# Patient Record
Sex: MALE | Race: WHITE | NOT HISPANIC OR LATINO | Employment: OTHER | URBAN - METROPOLITAN AREA
[De-identification: names, ages, dates, MRNs, and addresses within clinical notes are randomized per-mention and may not be internally consistent; named-entity substitution may affect disease eponyms.]

---

## 2017-01-16 ENCOUNTER — TRANSFERRED RECORDS (OUTPATIENT)
Dept: HEALTH INFORMATION MANAGEMENT | Facility: CLINIC | Age: 52
End: 2017-01-16

## 2017-01-18 ENCOUNTER — TRANSFERRED RECORDS (OUTPATIENT)
Dept: HEALTH INFORMATION MANAGEMENT | Facility: CLINIC | Age: 52
End: 2017-01-18

## 2017-02-15 ENCOUNTER — OFFICE VISIT (OUTPATIENT)
Dept: FAMILY MEDICINE | Facility: CLINIC | Age: 52
End: 2017-02-15
Payer: COMMERCIAL

## 2017-02-15 VITALS
DIASTOLIC BLOOD PRESSURE: 90 MMHG | WEIGHT: 205 LBS | BODY MASS INDEX: 32.18 KG/M2 | HEIGHT: 67 IN | SYSTOLIC BLOOD PRESSURE: 148 MMHG | HEART RATE: 88 BPM | TEMPERATURE: 99 F

## 2017-02-15 DIAGNOSIS — R42 DIZZINESS: Primary | ICD-10-CM

## 2017-02-15 DIAGNOSIS — R68.89 TEMPERATURE INTOLERANCE: ICD-10-CM

## 2017-02-15 LAB
ANION GAP SERPL CALCULATED.3IONS-SCNC: 9 MMOL/L (ref 3–14)
BUN SERPL-MCNC: 10 MG/DL (ref 7–30)
CALCIUM SERPL-MCNC: 9 MG/DL (ref 8.5–10.1)
CHLORIDE SERPL-SCNC: 107 MMOL/L (ref 94–109)
CO2 SERPL-SCNC: 26 MMOL/L (ref 20–32)
CREAT SERPL-MCNC: 1.04 MG/DL (ref 0.66–1.25)
ERYTHROCYTE [DISTWIDTH] IN BLOOD BY AUTOMATED COUNT: 12.6 % (ref 10–15)
GFR SERPL CREATININE-BSD FRML MDRD: 75 ML/MIN/1.7M2
GLUCOSE SERPL-MCNC: 62 MG/DL (ref 70–99)
HCT VFR BLD AUTO: 46.2 % (ref 40–53)
HGB BLD-MCNC: 16.3 G/DL (ref 13.3–17.7)
MCH RBC QN AUTO: 29.6 PG (ref 26.5–33)
MCHC RBC AUTO-ENTMCNC: 35.3 G/DL (ref 31.5–36.5)
MCV RBC AUTO: 84 FL (ref 78–100)
PLATELET # BLD AUTO: 279 10E9/L (ref 150–450)
POTASSIUM SERPL-SCNC: 3.8 MMOL/L (ref 3.4–5.3)
RBC # BLD AUTO: 5.51 10E12/L (ref 4.4–5.9)
SODIUM SERPL-SCNC: 142 MMOL/L (ref 133–144)
TSH SERPL DL<=0.005 MIU/L-ACNC: 1.22 MU/L (ref 0.4–4)
WBC # BLD AUTO: 8.1 10E9/L (ref 4–11)

## 2017-02-15 PROCEDURE — 80048 BASIC METABOLIC PNL TOTAL CA: CPT | Performed by: FAMILY MEDICINE

## 2017-02-15 PROCEDURE — 85027 COMPLETE CBC AUTOMATED: CPT | Performed by: FAMILY MEDICINE

## 2017-02-15 PROCEDURE — 99214 OFFICE O/P EST MOD 30 MIN: CPT | Performed by: FAMILY MEDICINE

## 2017-02-15 PROCEDURE — 36415 COLL VENOUS BLD VENIPUNCTURE: CPT | Performed by: FAMILY MEDICINE

## 2017-02-15 PROCEDURE — 93000 ELECTROCARDIOGRAM COMPLETE: CPT | Performed by: FAMILY MEDICINE

## 2017-02-15 PROCEDURE — 84443 ASSAY THYROID STIM HORMONE: CPT | Performed by: FAMILY MEDICINE

## 2017-02-15 ASSESSMENT — ANXIETY QUESTIONNAIRES
1. FEELING NERVOUS, ANXIOUS, OR ON EDGE: MORE THAN HALF THE DAYS
5. BEING SO RESTLESS THAT IT IS HARD TO SIT STILL: MORE THAN HALF THE DAYS
2. NOT BEING ABLE TO STOP OR CONTROL WORRYING: NEARLY EVERY DAY
7. FEELING AFRAID AS IF SOMETHING AWFUL MIGHT HAPPEN: NOT AT ALL
6. BECOMING EASILY ANNOYED OR IRRITABLE: SEVERAL DAYS
GAD7 TOTAL SCORE: 12
3. WORRYING TOO MUCH ABOUT DIFFERENT THINGS: MORE THAN HALF THE DAYS

## 2017-02-15 ASSESSMENT — PATIENT HEALTH QUESTIONNAIRE - PHQ9: 5. POOR APPETITE OR OVEREATING: MORE THAN HALF THE DAYS

## 2017-02-15 NOTE — PATIENT INSTRUCTIONS
*   Not sure what's going. Will start the process.     *   Do routine blood tests, EKG.     *   Also, obtain a MRI of the brain. Call [phone] to set this up.     *   In the meantime, don't fall and hurt yourself.     *   Next step: see cardiology. Call [phone].

## 2017-02-15 NOTE — MR AVS SNAPSHOT
After Visit Summary   2/15/2017    Edis Burroughs    MRN: [id number]           Patient Information     Date Of Birth          1965        Visit Information        Provider Department      2/15/2017 3:00 PM Ashwini Jessica MD [facility]        Today's Diagnoses     Dizziness    -  1    Temperature intolerance          Care Instructions    *   Not sure what's going. Will start the process.     *   Do routine blood tests, EKG.     *   Also, obtain a MRI of the brain. Call [phone] to set this up.     *   In the meantime, don't fall and hurt yourself.     *   Next step: see cardiology. Call [phone].         Follow-ups after your visit        Additional Services     CARDIOLOGY EVAL ADULT REFERRAL       Your provider has referred you to:  [facility]: [facility] [phone]   [email or web address]    Please be aware that coverage of these services is subject to the terms and limitations of your health insurance plan.  Call member services at your health plan with any benefit or coverage questions.      Type of Referral:  New Cardiology Consult    Timeframe requested:  Within 1 month    Please bring the following to your appointment:  >>   Any x-rays, CTs or MRIs which have been performed.  Contact the facility where they were done to arrange for  prior to your scheduled appointment.    >>   List of current medications  >>   This referral request   >>   Any documents/labs given to you for this referral                  Future tests that were ordered for you today     Open Future Orders        Priority Expected Expires Ordered    MR Brain w/o & w Contrast Routine  2/15/2018 2/15/2017            Who to contact     Normal or non-critical lab and imaging results will be communicated to you by MyChart, letter or phone within 4 business days after the clinic has received the results. If you do  "not hear from us within 7 days, please contact the clinic through [employer] or phone. If you have a critical or abnormal lab result, we will notify you by phone as soon as possible.  Submit refill requests through [employer] or call your pharmacy and they will forward the refill request to us. Please allow 3 business days for your refill to be completed.          If you need to speak with a  for additional information , please call: [phone]           Additional Information About Your Visit        [employer] Information     [employer] lets you send messages to your doctor, view your test results, renew your prescriptions, schedule appointments and more. To sign up, go to [email or web address] . Click on \"Log in\" on the left side of the screen, which will take you to the Welcome page. Then click on \"Sign up Now\" on the right side of the page.     You will be asked to enter the access code listed below, as well as some personal information. Please follow the directions to create your username and password.     Your access code is: [email or web address]  Expires: 2017 11:20 AM     Your access code will  in 90 days. If you need help or a new code, please call your [facility] clinic or [phone].        Care EveryWhere ID     This is your Care EveryWhere ID. This could be used by other organizations to access your [facility] medical records  [id number]        Your Vitals Were     Pulse Temperature Height BMI (Body Mass Index)          88 99  F (37.2  C) (Tympanic) 5' 7\" (1.702 m) 32.11 kg/m2         Blood Pressure from Last 3 Encounters:   02/15/17 148/90   16 138/80   16 130/88    Weight from Last 3 Encounters:   02/15/17 205 lb (93 kg)   16 210 lb (95.3 kg)   16 206 lb 3.2 oz (93.5 kg)              We Performed the Following     Basic metabolic panel     CARDIOLOGY EVAL ADULT REFERRAL     CBC with platelets     EKG 12-lead complete w/read - Clinics     TSH with free T4 reflex     "      Today's Medication Changes          These changes are accurate as of: 2/15/17  3:40 PM.  If you have any questions, ask your nurse or doctor.               Stop taking these medicines if you haven't already. Please contact your care team if you have questions.     ciprofloxacin 500 MG tablet   Commonly known as:  CIPRO   Stopped by:  Ashwini Jessica MD                    Primary Care Provider Office Phone # Fax #    Ashwini Jessica [phone]       [facility] [address]   [place] [address]        Thank you!     Thank you for choosing [facility]  for your care. Our goal is always to provide you with excellent care. Hearing back from our patients is one way we can continue to improve our services. Please take a few minutes to complete the written survey that you may receive in the mail after your visit with us. Thank you!             Your Updated Medication List - Protect others around you: Learn how to safely use, store and throw away your medicines at www.disposemymeds.org.          This list is accurate as of: 2/15/17  3:40 PM.  Always use your most recent med list.                   Brand Name Dispense Instructions for use    divalproex 500 MG 24 hr tablet    DEPAKOTE ER     Take 500 mg by mouth daily.       LamoTRIgine 300 MG Tb24    LaMICtal     Take  by mouth. One daily       OLANZapine 20 MG tablet    zyPREXA         SEROQUEL 300 MG tablet   Generic drug:  QUEtiapine      Take 300 mg by mouth. TID qhs       valACYclovir 1000 mg tablet    VALTREX    21 tablet    Take 1 tablet (1,000 mg) by mouth 3 times daily       ZOLOFT 100 MG tablet   Generic drug:  sertraline     60 Tab    2 TABLET DAILY

## 2017-02-15 NOTE — NURSING NOTE
"Chief Complaint   Patient presents with     Dizziness       Initial /90 (BP Location: Left arm, Patient Position: Chair, Cuff Size: Adult Large)  Pulse 88  Temp 99  F (37.2  C) (Tympanic)  Ht 5' 7\" (1.702 m)  Wt 205 lb (93 kg)  BMI 32.11 kg/m2 Estimated body mass index is 32.11 kg/(m^2) as calculated from the following:    Height as of this encounter: 5' 7\" (1.702 m).    Weight as of this encounter: 205 lb (93 kg).  Medication Reconciliation: complete    "

## 2017-02-15 NOTE — PROGRESS NOTES
"  SUBJECTIVE:                                                    Edis Burroughs is a 51 year old male who presents to clinic today for the following health issues:    - Dizziness with tunnel vision x2 months. He has thrown up in the mornings and he has had diarrhea. When this happens he feels warm and body shakes. He has fallen when at home 2 times. No injuries He has been having ECT treatment recently and is wondering if this is related, he has not stopped this 2 weeks ago until dizziness can be resolved. He seems to have these episodes daily, average happening 1-4 episodes per day, episodes usually last 30 seconds - 2 minutes.He usually has to sit down or hold onto fixed item to wit for episode to pass. He describes his dizziness as the room gets dark and narrows. He feels like he is going to faint but does not pass out. He says he just shuts down, its not like he loses his balance. He has not noticed fast or irregular heartbeats. He has had headaches and ringing in the ear at times. He has had 2 episodes today and has had up to 4 in one day. He has stopped the ECT treatment until he figures out what is going on with his dizziness. He has not had an MRI for his brain. He has not noticed any changes in his hearing. He has no metal in his body. He also states he gets real flushed, warm, and shaky when the dizziness hits.     Dizziness     Onset: 2 months    Description:   Do you feel faint:  YES  Does it feel like the surroundings (bed, room) are moving: no   Unsteady/off balance: YES  Have you passed out or fallen: YES    Intensity: severe    Progression of Symptoms:  worsening and waxing and waning    Accompanying Signs & Symptoms:  Heart palpitations: no   Nausea, vomiting: no   Weakness in arms or legs: YES  Fatigue: YES- but states that for years he only averages 3-4 hours of sleep  Vision or speech changes: YES- Blurry, \"cloudy\" vision  Ringing in ears (Tinnitus): YES  Hearing Loss: no    History:   Head " "trauma/concussion hx: no   Previous similar symptoms: no   Recent bleeding history: no     Precipitating factors:   Worse with activity or head movement: no   Any new medications (BP?): no   Alcohol/drug abuse/withdrawal: no     Alleviating factors:   Does staying in a fixed position give relief:  YES       Therapies Tried and outcome: None          ROS:  Constitutional, HEENT, cardiovascular, pulmonary, gi and gu systems are negative, except as otherwise noted.    This document serves as a record of the services and decisions personally performed and made by Ashwini Jessica MD. It was created on his behalf by Sonu Garcia, a trained medical scribe. The creation of this document is based the provider's statements to the medical scribe.  Sonu Garcia 2:54 PM February 15, 2017      OBJECTIVE:                                                    /90 (BP Location: Left arm, Patient Position: Chair, Cuff Size: Adult Large)  Pulse 88  Temp 99  F (37.2  C) (Tympanic)  Ht 5' 7\" (1.702 m)  Wt 205 lb (93 kg)  BMI 32.11 kg/m2  Body mass index is 32.11 kg/(m^2).     GENERAL: healthy, alert and no distress  EYES: Eyes grossly normal to inspection, conjunctivae and sclerae normal  HENT: ear canals and TM's normal, nose and mouth without ulcers or lesions  RESP: lungs clear to auscultation - no rales, rhonchi or wheezes  CV: regular rate and rhythm, normal S1 S2, no murmur  MS: no gross musculoskeletal defects noted, no edema. Reflexes intact. Balance appears normal.   NEURO: Normal strength and tone, mentation intact and speech normal  PSYCH: mentation appears normal, affect normal/bright         ASSESSMENT/PLAN:                                                      (R42) Dizziness  (primary encounter diagnosis)  Comment: Etiology unknown. I ordered EKG and MRI for further evaluation. Will also do blood tests for further evaluation. I advised him to see a cardiologist, seems like a symptom of low blood pressure.    Plan: " EKG 12-lead complete w/read - Clinics, MR Brain        w/o & w Contrast, CBC with platelets, Basic         metabolic panel         (R68.89) Temperature intolerance  Comment: Etiology unknown. Will check thyroid function.   Plan: TSH with free T4 reflex              Patient Instructions   *   Not sure what's going. Will start the process.     *   Do routine blood tests, EKG.     *   Also, obtain a MRI of the brain. Call [phone] to set this up.     *   In the meantime, don't fall and hurt yourself.     *   Next step: see cardiology. Call [phone].       Patient will follow up if symptoms worsen or do not improve. Patient instructed to call with any questions or concerns.    The information in this document, created by a scribe for me, accurately reflects the services I personally performed and the decisions made by me. I have reviewed and approved this document for accuracy. 3:09 PM2/15/2017      Ashwini Jessica MD  [facility]

## 2017-02-16 ASSESSMENT — PATIENT HEALTH QUESTIONNAIRE - PHQ9: SUM OF ALL RESPONSES TO PHQ QUESTIONS 1-9: 18

## 2017-02-16 ASSESSMENT — ANXIETY QUESTIONNAIRES: GAD7 TOTAL SCORE: 12

## 2017-02-20 ENCOUNTER — HOSPITAL ENCOUNTER (OUTPATIENT)
Dept: MRI IMAGING | Facility: CLINIC | Age: 52
Discharge: HOME | End: 2017-02-20
Attending: FAMILY MEDICINE | Admitting: FAMILY MEDICINE
Payer: MEDICARE

## 2017-02-20 DIAGNOSIS — R42 DIZZINESS: ICD-10-CM

## 2017-02-20 PROCEDURE — 70553 MRI BRAIN STEM W/O & W/DYE: CPT | Mod: 25

## 2017-02-20 PROCEDURE — 25500064 ZZH RX 255 OP 636: Performed by: FAMILY MEDICINE

## 2017-02-20 PROCEDURE — A9585 GADOBUTROL INJECTION: HCPCS | Performed by: FAMILY MEDICINE

## 2017-02-20 RX ORDER — GADOBUTROL 604.72 MG/ML
9 INJECTION INTRAVENOUS ONCE
Status: COMPLETED | OUTPATIENT
Start: 2017-02-20 | End: 2017-02-20

## 2017-02-20 RX ADMIN — GADOBUTROL 9 ML: 604.72 INJECTION INTRAVENOUS at 08:38

## 2017-03-20 ENCOUNTER — OFFICE VISIT (OUTPATIENT)
Dept: CARDIOLOGY | Facility: CLINIC | Age: 52
End: 2017-03-20
Attending: FAMILY MEDICINE
Payer: COMMERCIAL

## 2017-03-20 VITALS
BODY MASS INDEX: 32.11 KG/M2 | SYSTOLIC BLOOD PRESSURE: 169 MMHG | WEIGHT: 205 LBS | HEART RATE: 92 BPM | DIASTOLIC BLOOD PRESSURE: 98 MMHG | OXYGEN SATURATION: 97 %

## 2017-03-20 DIAGNOSIS — I10 BENIGN ESSENTIAL HYPERTENSION: Primary | ICD-10-CM

## 2017-03-20 DIAGNOSIS — R55 SYNCOPE, UNSPECIFIED SYNCOPE TYPE: ICD-10-CM

## 2017-03-20 DIAGNOSIS — G47.00 INSOMNIA, UNSPECIFIED TYPE: ICD-10-CM

## 2017-03-20 PROCEDURE — 99205 OFFICE O/P NEW HI 60 MIN: CPT | Performed by: INTERNAL MEDICINE

## 2017-03-20 RX ORDER — LOSARTAN POTASSIUM 50 MG/1
50 TABLET ORAL DAILY
Qty: 30 TABLET | Refills: 11 | Status: SHIPPED
Start: 2017-03-20 | End: 2017-03-24

## 2017-03-20 NOTE — PROGRESS NOTES
HPI and Plan:   See dictation    Orders Placed This Encounter   Procedures     Basic metabolic panel     SLEEP EVALUATION & MANAGEMENT REFERRAL - ADULT     Follow-Up with Cardiologist     Follow-Up with Cardiac Advanced Practice Provider     Holter Monitor 24 hour - Adult     Exercise Stress Echocardiogram       Orders Placed This Encounter   Medications     losartan (COZAAR) 50 MG tablet     Sig: Take 1 tablet (50 mg) by mouth daily     Dispense:  30 tablet     Refill:  11       There are no discontinued medications.      Encounter Diagnoses   Name Primary?     Benign essential hypertension Yes     Syncope, unspecified syncope type      Insomnia, unspecified type        CURRENT MEDICATIONS:  Current Outpatient Prescriptions   Medication Sig Dispense Refill     losartan (COZAAR) 50 MG tablet Take 1 tablet (50 mg) by mouth daily 30 tablet 11     OLANZapine (ZYPREXA) 20 MG tablet        valACYclovir (VALTREX) 1000 mg tablet Take 1 tablet (1,000 mg) by mouth 3 times daily 21 tablet 0     QUEtiapine (SEROQUEL) 300 MG tablet Take 300 mg by mouth. TID qhs       divalproex (DEPAKOTE) 500 MG 24 hr tablet Take 500 mg by mouth daily.       LamoTRIgine 300 MG TB24 Take  by mouth. One daily       ZOLOFT 100 MG OR TABS 2 TABLET DAILY 60 Tab 1       ALLERGIES     Allergies   Allergen Reactions     Vicodin [Acetaminophen] GI Disturbance     Stomach discomfort       PAST MEDICAL HISTORY:  No past medical history on file.    PAST SURGICAL HISTORY:  Past Surgical History   Procedure Laterality Date     C appendectomy  1997     C laminectomy,facetectomy,lumbar  2008       FAMILY HISTORY:  Family History   Problem Relation Age of Onset     Prostate Cancer Father      HEART DISEASE Father      open heart surgery     CANCER Father      lung     BARTOLO.AESCOBAR Father      triple bypass, first MI age 59     DIABETES Maternal Grandmother      GIOVANNY Maternal Grandmother      DIABETES Maternal Grandfather      GIOVANNY Maternal Grandfather       CEREBROVASCULAR DISEASE Paternal Grandfather      C.A.D. Paternal Grandfather      Hypertension No family hx of      Breast Cancer No family hx of      Cancer - colorectal No family hx of      C.A.D. No family hx of      C.A.D. Paternal Grandmother      CANCER Sister      leukemia       SOCIAL HISTORY:  Social History     Social History     Marital status:      Spouse name: N/A     Number of children: N/A     Years of education: N/A     Social History Main Topics     Smoking status: Never Smoker     Smokeless tobacco: Never Used     Alcohol use 0.0 oz/week     0 Standard drinks or equivalent per week      Comment: rarely     Drug use: No     Sexual activity: Yes     Partners: Female     Other Topics Concern     Parent/Sibling W/ Cabg, Mi Or Angioplasty Before 65f 55m? No     Social History Narrative       Review of Systems:  Skin:  Negative       Eyes:  Positive for visual blurring    ENT:  Positive for tinnitus;nasal congestion    Respiratory:  Positive for dyspnea on exertion     Cardiovascular:    Positive for;chest pain;lightheadedness;syncope or near-syncope    Gastroenterology: Negative      Genitourinary:  Positive for urinary frequency    Musculoskeletal:  Negative      Neurologic:  Negative      Psychiatric:  Positive for anxiety;depression    Heme/Lymph/Imm:  Negative      Endocrine:  Negative        Physical Exam:  Vitals: BP (!) 169/98 (BP Location: Right arm, Patient Position: Chair, Cuff Size: Adult Regular)  Pulse 92  Wt 93 kg (205 lb)  SpO2 97%  BMI 32.11 kg/m2    Constitutional:  cooperative, alert and oriented, well developed, well nourished, in no acute distress        Skin:  warm and dry to the touch        Head:  normocephalic        Eyes:  sclera white        ENT:           Neck:  carotid pulses are full and equal bilaterally, JVP normal, no carotid bruit, no thyromegaly        Chest:  normal breath sounds, clear to auscultation, normal A-P diameter, normal symmetry, normal respiratory  excursion, no use of accessory muscles          Cardiac: regular rhythm, normal S1/S2, no S3 or S4, apical impulse not displaced, no murmurs, gallops or rubs                  Abdomen:  abdomen soft, non-tender, BS normoactive, no mass, no HSM, no bruits        Vascular:                                          Extremities and Back:  no deformities, clubbing, cyanosis, erythema observed;no edema              Neurological:  affect appropriate, oriented to time, person and place;no gross motor deficits              CC  Ashwini Jessica MD  [facility]  [address] [place] [address]

## 2017-03-20 NOTE — PATIENT INSTRUCTIONS
"Thank you for your [facility] visit today. Your provider has recommended the following:  Medication Changes:  Start Losartan 50 mg 1 tablet a day  Recommendations:  Non fasting labs BMP in 1-2 weeks  Stress echocardiogram next available  24 hour Holter next available  Sleep study next available  Your provider has recommended you have a sleep consult referral & sleep study done. To schedule this appointment, please call the number below where you would like your referral and study completed.  [facility]: [phone]; [place]: [phone]; [facility]: [phone]; [place]: [phone]. (If you have an existing C-PAP machine and are looking to talk with someone regarding re-adjustments or any problems with your machine you can contact \"[facility]\" in [place], [place] at [phone])    Follow-up:  See STEVE for cardiology follow up in 1 month.  See Dr. Machado in 6-9 months.  We kindly ask that you call cardiology scheduling at [phone] three months prior to requested revisit date to schedule future cardiology appointments.  Reminder:  1. Please bring in your current medication list or your medication, over the counter supplements and vitamin bottles as we will review these at each office visit.               [facility]  [facility]~[address]~[place], [place]~[address]  Questions about your visit today?  Call your Cardiology Clinic RN's-Elaine Santana and/or Shelby Serna at [phone].        "

## 2017-03-20 NOTE — LETTER
"3/20/2017    Ashwini Jessica MD  [facility]  [address] [place] [address]    RE: Edis Burroughs       Dear Colleague,    I had the pleasure of seeing Edis Burroughs in the [facility].    Mr. Edis Burroughs is 51 years old.  He has been referred for cardiology consultation for near-syncope/syncope by Dr. sAhwini Jessica.      Mr. Burroughs denies any prior history of heart disease.  He has previously undergone echocardiography and that was a number of years ago.  His left ventricular systolic performance was normal and he had no evidence of structural heart disease.      In the last 6 months, he has experienced episodic near-syncope/syncope.  He has typically experienced indices the episodes when he is standing.  He will feel a warm or flushed feeling come over him.  He then develops \"tunnel vision\" and feels light-headed.  He usually has some morning.  He was in a [employer] within the last several weeks with his son.  He felt the symptoms and told his son he needed to sit down.  His son was trying to help him to a chair, but they did not reach a chair and he was syncopal.  He awakened relatively quickly and he did not have any injury.  He notes that the employees wanted to call an ambulance, but he told them that this had happened before and he would be fine.      He is uncertain that he experiences nausea, shortness of breath or chest discomfort with the episodes.  Those were symptoms which he had never noticed.  He does not believe that these episodes occur at any particular time of day or with any particular activity.  He has had 1 episode while driving.  He was not syncopal.  He notes that he felt warm and had time to pull off the road and into a parking lot associated with a fast food [employer].  He sat in his car for about 10 minutes until the symptom passed.  The symptoms are not associated with palpitations such as tachycardia or more forceful contractions.  " They do not occur right after meals.      Mr. Burroughs does not typically develop chest discomfort.  He does have a family history of coronary artery disease.  He does not currently smoke and does not have diabetes mellitus.  He has been hypertensive and he notes that his blood pressures have been increasing.      He noted the episodes beginning about 6 months ago.  He has been treated for depression and the medications utilized have not changed recently.  He began ECT about 2 months ago.  He believes the episodes became more frequent with ECT and may have occurred within a day or possibly 2 days of ECT treatments.  He has not completed the initial set of prescribed treatments which does not allow him to assess the efficacy of ECT, but he is very hopeful that he can resume ECT which has been temporarily suspended.      He has not been using alcohol in association with the episodes and does not typically drink alcohol now.  He is not using any over-the-counter supplements.  He has not recently changed his activity.  He is retired.  He has been able to go out for walks without difficulty or any change in exertional tolerance or dyspnea.      PHYSICAL EXAMINATION:   GENERAL:  This is a man in no apparent distress.  The first blood pressure was 169/98 mmHg, the second was taken by myself and was 160/85 mmHg in the right arm, sitting, with a large cuff.  The heart rate was 92 beats per minute and the respiratory rate was 14-18 per minute.   CHEST:  Clear to auscultation.   CARDIAC:  On cardiac auscultation, there was an S1 and an S2 without extra sounds other than an S4.  The rhythm was regular.  There was no murmur.     Outpatient Encounter Prescriptions as of 3/20/2017   Medication Sig Dispense Refill     [DISCONTINUED] losartan (COZAAR) 50 MG tablet Take 1 tablet (50 mg) by mouth daily 30 tablet 11     OLANZapine (ZYPREXA) 20 MG tablet        valACYclovir (VALTREX) 1000 mg tablet Take 1 tablet (1,000 mg) by mouth 3  times daily 21 tablet 0     QUEtiapine (SEROQUEL) 300 MG tablet Take 300 mg by mouth. TID qhs       divalproex (DEPAKOTE) 500 MG 24 hr tablet Take 500 mg by mouth daily.       LamoTRIgine 300 MG TB24 Take  by mouth. One daily       ZOLOFT 100 MG OR TABS 2 TABLET DAILY 60 Tab 1     No facility-administered encounter medications on file as of 3/20/2017.       ASSESSMENT:  Mr. Burroughs has episodes of near-syncope which began before ECT, but which have increased in severity and frequency with ECT, so he believes.  He thinks that over the years he may have had similar but less severe and very infrequent episodes.      Most likely, his symptoms are vagal.  They may be a response to the electroconvulsive therapy which is also associated with anesthesia and being n.p.o. in preparation for the procedure.  That combination of circumstances could precipitate more significant vagal events later in the day.  I explained this to Mr. Burroughs.      I am also suspicious that the development of hypertension has contributed to greater swings in his blood pressure.  I have recommended antihypertensive therapy which I believe will be beneficial and may actually reduce the episodes if significant hypertension is a precipitating factor.  As such, I recommended antihypertensive therapy and I discussed this with Mr. Burroughs.      I have also arranged for stress echocardiography.  I would like to repeat echocardiography to be certain that there is no obvious structural heart disease.  I have recommended the stress portion to rule out coronary artery disease which can be a rare cause of near-syncope or syncope and the blood pressure response as well as the heart rate response to exercise will be beneficial.      I have also recommended a return visit.  I have recommended a 24-hour Holter monitor be performed to evaluate any arrhythmias.      Mr. Burroughs has been experiencing these episodes nearly daily, though fortunately most episodes are very  brief.  The episodes typically last less than 10 minutes and the less severe episodes resolve relatively rapidly.  He does try to sit down and I have recommended that whenever he feels the symptoms coming on he sit down or lie down and immediately tell people that this is a typical event for him and that he will be able to handle the symptoms.  The episodes seem to have decreased with the hold on electroconvulsive therapy, but have continued.      My suspicion is that he will benefit from treatment of his hypertension.  He will benefit from an increase in activity.  I have also recommended a sleep study as I am highly suspicious that he has sleep apnea (he does not sleep well) and that sleep deprivation is contributing to these symptoms.  In addition, sleep apnea can lead to pulmonary hypertension and right ventricular hypertrophy with impaired cardiac output in response to precipitous blood pressure declined.  I discussed this with Mr. Burroughs and have also recommended a sleep study which he is anxious to perform.      Further recommendations will depend upon his response to therapy.     Again, thank you for allowing me to participate in the care of your patient.      Sincerely,    Sudha Machado MD     [facility]

## 2017-03-20 NOTE — MR AVS SNAPSHOT
"              After Visit Summary   3/20/2017    Edis Burroughs    MRN: [id number]           Patient Information     Date Of Birth          1965        Visit Information        Provider Department      3/20/2017 10:30 AM Sudha Machado MD [facility] PHYSICIAN HEART AT [facility]        Today's Diagnoses     Benign essential hypertension    -  1    Syncope, unspecified syncope type        Insomnia, unspecified type          Care Instructions    Thank you for your  Heart Care visit today. Your provider has recommended the following:  Medication Changes:  Start Losartan 50 mg 1 tablet a day  Recommendations:  Non fasting labs BMP in 1-2 weeks  Stress echocardiogram next available  24 hour Holter next available  Sleep study next available  Your provider has recommended you have a sleep consult referral & sleep study done. To schedule this appointment, please call the number below where you would like your referral and study completed.  [facility]: [phone]; [place]: [phone]; [facility]: [phone]; [place] [facility]: [phone]. (If you have an existing C-PAP machine and are looking to talk with someone regarding re-adjustments or any problems with your machine you can contact \"[facility]\" in [place], [place] at [phone])    Follow-up:  See STEVE for cardiology follow up in 1 month.  See Dr. Machado in 6-9 months.  We kindly ask that you call cardiology scheduling at [phone] three months prior to requested revisit date to schedule future cardiology appointments.  Reminder:  1. Please bring in your current medication list or your medication, over the counter supplements and vitamin bottles as we will review these at each office visit.               [facility] HE[facility] CARE  [facility]~[address]~[place], [place]~[address]  Questions about your visit today?  Call your Cardiology Clinic RN's-Elaine Santana and/or Shelby Serna at " [phone].              Follow-ups after your visit        Additional Services     SLEEP EVALUATION & MANAGEMENT REFERRAL - ADULT       Please be aware that coverage of these services is subject to the terms and limitations of your health insurance plan.  Call member services at your health plan with any benefit or coverage questions.      Please bring the following to your appointment:    >>   List of current medications   >>   This referral request   >>   Any documents/labs given to you for this referral    [facility] / Lab  Ph [phone] (Age 2 and up)            Follow-Up with Cardiac Advanced Practice Provider           Follow-Up with Cardiologist                 Future tests that were ordered for you today     Open Future Orders        Priority Expected Expires Ordered    Exercise Stress Echocardiogram Routine 3/20/2017 3/20/2018 3/20/2017    Holter Monitor 24 hour - Adult Routine 3/20/2017 5/4/2017 3/20/2017    SLEEP EVALUATION & MANAGEMENT REFERRAL - ADULT Routine 3/20/2017 3/20/2018 3/20/2017    Follow-Up with Cardiologist Routine 9/20/2017 3/20/2019 3/20/2017    Follow-Up with Cardiac Advanced Practice Provider Routine 4/20/2017 3/20/2019 3/20/2017    Basic metabolic panel Routine 4/7/2017 3/20/2018 3/20/2017            Who to contact     If you have questions or need follow up information about today's clinic visit or your schedule please contact [facility] PHYSICIAN HEART AT [facility] directly at [phone].  Normal or non-critical lab and imaging results will be communicated to you by MyChart, letter or phone within 4 business days after the clinic has received the results. If you do not hear from us within 7 days, please contact the clinic through MyChart or phone. If you have a critical or abnormal lab result, we will notify you by phone as soon as possible.  Submit refill requests through [facility] or call your pharmacy and they will forward the refill  "request to us. Please allow 3 business days for your refill to be completed.          Additional Information About Your Visit        [employer] Information     [employer] lets you send messages to your doctor, view your test results, renew your prescriptions, schedule appointments and more. To sign up, go to [email or web address] . Click on \"Log in\" on the left side of the screen, which will take you to the Welcome page. Then click on \"Sign up Now\" on the right side of the page.     You will be asked to enter the access code listed below, as well as some personal information. Please follow the directions to create your username and password.     Your access code is: [email or web address]  Expires: 2017 11:47 AM     Your access code will  in 90 days. If you need help or a new code, please call your [facility] clinic or [phone].        Care EveryWhere ID     This is your Care EveryWhere ID. This could be used by other organizations to access your [facility] medical records  [id number]        Your Vitals Were     Pulse Pulse Oximetry BMI (Body Mass Index)             92 97% 32.11 kg/m2          Blood Pressure from Last 3 Encounters:   17 (!) 169/98   02/15/17 148/90   16 138/80    Weight from Last 3 Encounters:   17 93 kg (205 lb)   02/15/17 93 kg (205 lb)   16 95.3 kg (210 lb)                 Today's Medication Changes          These changes are accurate as of: 3/20/17 11:47 AM.  If you have any questions, ask your nurse or doctor.               Start taking these medicines.        Dose/Directions    losartan 50 MG tablet   Commonly known as:  COZAAR   Used for:  Benign essential hypertension   Started by:  Sudha Machado MD        Dose:  50 mg   Take 1 tablet (50 mg) by mouth daily   Quantity:  30 tablet   Refills:  11            Where to get your medicines      These medications were sent to [facility]  EMEKA GALLEGOS  [address] [facility] [address]     Phone:  [phone]     " losartan 50 MG tablet                Primary Care Provider Office Phone # Fax #    Ashwini Jessica [phone]       [facility] [address] [place] [address]        Thank you!     Thank you for choosing [facility] PHYSICIAN HEART AT [facility]  for your care. Our goal is always to provide you with excellent care. Hearing back from our patients is one way we can continue to improve our services. Please take a few minutes to complete the written survey that you may receive in the mail after your visit with us. Thank you!             Your Updated Medication List - Protect others around you: Learn how to safely use, store and throw away your medicines at www.disposemymeds.org.          This list is accurate as of: 3/20/17 11:47 AM.  Always use your most recent med list.                   Brand Name Dispense Instructions for use    divalproex 500 MG 24 hr tablet    DEPAKOTE ER     Take 500 mg by mouth daily.       LamoTRIgine 300 MG Tb24    LaMICtal     Take  by mouth. One daily       losartan 50 MG tablet    COZAAR    30 tablet    Take 1 tablet (50 mg) by mouth daily       OLANZapine 20 MG tablet    zyPREXA         SEROQUEL 300 MG tablet   Generic drug:  QUEtiapine      Take 300 mg by mouth. TID qhs       valACYclovir 1000 mg tablet    VALTREX    21 tablet    Take 1 tablet (1,000 mg) by mouth 3 times daily       ZOLOFT 100 MG tablet   Generic drug:  sertraline     60 Tab    2 TABLET DAILY

## 2017-03-22 NOTE — PROGRESS NOTES
"HISTORY OF PRESENT ILLNESS:  Mr. Edis Burroughs is 51 years old.  He has been referred for cardiology consultation for near-syncope/syncope by Dr. Ashwini Jessica.      Mr. Burroughs denies any prior history of heart disease.  He has previously undergone echocardiography and that was a number of years ago.  His left ventricular systolic performance was normal and he had no evidence of structural heart disease.      In the last 6 months, he has experienced episodic near-syncope/syncope.  He has typically experienced indices the episodes when he is standing.  He will feel a warm or flushed feeling come over him.  He then develops \"tunnel vision\" and feels light-headed.  He usually has some morning.  He was in a [employer] within the last several weeks with his son.  He felt the symptoms and told his son he needed to sit down.  His son was trying to help him to a chair, but they did not reach a chair and he was syncopal.  He awakened relatively quickly and he did not have any injury.  He notes that the employees wanted to call an ambulance, but he told them that this had happened before and he would be fine.      He is uncertain that he experiences nausea, shortness of breath or chest discomfort with the episodes.  Those were symptoms which he had never noticed.  He does not believe that these episodes occur at any particular time of day or with any particular activity.  He has had 1 episode while driving.  He was not syncopal.  He notes that he felt warm and had time to pull off the road and into a parking lot associated with a fast food [employer].  He sat in his car for about 10 minutes until the symptom passed.  The symptoms are not associated with palpitations such as tachycardia or more forceful contractions.  They do not occur right after meals.      Mr. Burroughs does not typically develop chest discomfort.  He does have a family history of coronary artery disease.  He does not currently smoke and does not have diabetes " mellitus.  He has been hypertensive and he notes that his blood pressures have been increasing.      He noted the episodes beginning about 6 months ago.  He has been treated for depression and the medications utilized have not changed recently.  He began ECT about 2 months ago.  He believes the episodes became more frequent with ECT and may have occurred within a day or possibly 2 days of ECT treatments.  He has not completed the initial set of prescribed treatments which does not allow him to assess the efficacy of ECT, but he is very hopeful that he can resume ECT which has been temporarily suspended.      He has not been using alcohol in association with the episodes and does not typically drink alcohol now.  He is not using any over-the-counter supplements.  He has not recently changed his activity.  He is retired.  He has been able to go out for walks without difficulty or any change in exertional tolerance or dyspnea.      PHYSICAL EXAMINATION:   GENERAL:  This is a man in no apparent distress.  The first blood pressure was 169/98 mmHg, the second was taken by myself and was 160/85 mmHg in the right arm, sitting, with a large cuff.  The heart rate was 92 beats per minute and the respiratory rate was 14-18 per minute.   CHEST:  Clear to auscultation.   CARDIAC:  On cardiac auscultation, there was an S1 and an S2 without extra sounds other than an S4.  The rhythm was regular.  There was no murmur.      ASSESSMENT:  Mr. Burroughs has episodes of near-syncope which began before ECT, but which have increased in severity and frequency with ECT, so he believes.  He thinks that over the years he may have had similar but less severe and very infrequent episodes.      Most likely, his symptoms are vagal.  They may be a response to the electroconvulsive therapy which is also associated with anesthesia and being n.p.o. in preparation for the procedure.  That combination of circumstances could precipitate more significant  vagal events later in the day.  I explained this to Mr. Burroughs.      I am also suspicious that the development of hypertension has contributed to greater swings in his blood pressure.  I have recommended antihypertensive therapy which I believe will be beneficial and may actually reduce the episodes if significant hypertension is a precipitating factor.  As such, I recommended antihypertensive therapy and I discussed this with Mr. Burroughs.      I have also arranged for stress echocardiography.  I would like to repeat echocardiography to be certain that there is no obvious structural heart disease.  I have recommended the stress portion to rule out coronary artery disease which can be a rare cause of near-syncope or syncope and the blood pressure response as well as the heart rate response to exercise will be beneficial.      I have also recommended a return visit.  I have recommended a 24-hour Holter monitor be performed to evaluate any arrhythmias.      Mr. Burroughs has been experiencing these episodes nearly daily, though fortunately most episodes are very brief.  The episodes typically last less than 10 minutes and the less severe episodes resolve relatively rapidly.  He does try to sit down and I have recommended that whenever he feels the symptoms coming on he sit down or lie down and immediately tell people that this is a typical event for him and that he will be able to handle the symptoms.  The episodes seem to have decreased with the hold on electroconvulsive therapy, but have continued.      My suspicion is that he will benefit from treatment of his hypertension.  He will benefit from an increase in activity.  I have also recommended a sleep study as I am highly suspicious that he has sleep apnea (he does not sleep well) and that sleep deprivation is contributing to these symptoms.  In addition, sleep apnea can lead to pulmonary hypertension and right ventricular hypertrophy with impaired cardiac output in  response to precipitous blood pressure declined.  I discussed this with Mr. Burroughs and have also recommended a sleep study which he is anxious to perform.      Further recommendations will depend upon his response to therapy.         CIRO BENNETT MD, [facility]             D: 2017 21:56   T: 2017 14:36   MT: MELQUIADES      Name:     CAMILLE BURROUGHS   MRN:      [id number]        Account:      [id number]   :      1965           Service Date: 2017      Document: [id number]

## 2017-03-24 DIAGNOSIS — I10 BENIGN ESSENTIAL HYPERTENSION: ICD-10-CM

## 2017-03-24 RX ORDER — LOSARTAN POTASSIUM 50 MG/1
50 TABLET ORAL DAILY
Qty: 30 TABLET | Refills: 11 | Status: SHIPPED | OUTPATIENT
Start: 2017-03-24 | End: 2018-03-05

## 2017-04-20 ENCOUNTER — HOSPITAL ENCOUNTER (OUTPATIENT)
Dept: CARDIOLOGY | Facility: CLINIC | Age: 52
Discharge: HOME | End: 2017-04-20
Attending: INTERNAL MEDICINE | Admitting: INTERNAL MEDICINE
Payer: MEDICARE

## 2017-04-20 DIAGNOSIS — R55 SYNCOPE, UNSPECIFIED SYNCOPE TYPE: ICD-10-CM

## 2017-04-20 PROCEDURE — 93321 DOPPLER ECHO F-UP/LMTD STD: CPT | Mod: 26 | Performed by: INTERNAL MEDICINE

## 2017-04-20 PROCEDURE — 93016 CV STRESS TEST SUPVJ ONLY: CPT | Performed by: FAMILY MEDICINE

## 2017-04-20 PROCEDURE — 25500064 ZZH RX 255 OP 636: Performed by: FAMILY MEDICINE

## 2017-04-20 PROCEDURE — 93018 CV STRESS TEST I&R ONLY: CPT | Performed by: INTERNAL MEDICINE

## 2017-04-20 PROCEDURE — 93325 DOPPLER ECHO COLOR FLOW MAPG: CPT | Mod: 26 | Performed by: INTERNAL MEDICINE

## 2017-04-20 PROCEDURE — 40000264 ECHO STRESS WITH OPTISON

## 2017-04-20 PROCEDURE — 93227 XTRNL ECG REC<48 HR R&I: CPT | Performed by: INTERNAL MEDICINE

## 2017-04-20 PROCEDURE — 93350 STRESS TTE ONLY: CPT | Mod: 26 | Performed by: INTERNAL MEDICINE

## 2017-04-20 RX ADMIN — HUMAN ALBUMIN MICROSPHERES AND PERFLUTREN 2 ML: 10; .22 INJECTION, SOLUTION INTRAVENOUS at 09:19

## 2017-04-29 DIAGNOSIS — I10 BENIGN ESSENTIAL HYPERTENSION: ICD-10-CM

## 2017-04-29 LAB
ANION GAP SERPL CALCULATED.3IONS-SCNC: 10 MMOL/L (ref 3–14)
BUN SERPL-MCNC: 14 MG/DL (ref 7–30)
CALCIUM SERPL-MCNC: 8.7 MG/DL (ref 8.5–10.1)
CHLORIDE SERPL-SCNC: 105 MMOL/L (ref 94–109)
CO2 SERPL-SCNC: 23 MMOL/L (ref 20–32)
CREAT SERPL-MCNC: 1.02 MG/DL (ref 0.66–1.25)
GFR SERPL CREATININE-BSD FRML MDRD: 77 ML/MIN/1.7M2
GLUCOSE SERPL-MCNC: 146 MG/DL (ref 70–99)
POTASSIUM SERPL-SCNC: 3.9 MMOL/L (ref 3.4–5.3)
SODIUM SERPL-SCNC: 138 MMOL/L (ref 133–144)

## 2017-04-29 PROCEDURE — 80048 BASIC METABOLIC PNL TOTAL CA: CPT | Performed by: FAMILY MEDICINE

## 2017-04-29 PROCEDURE — 36415 COLL VENOUS BLD VENIPUNCTURE: CPT | Performed by: FAMILY MEDICINE

## 2017-11-28 ENCOUNTER — TELEPHONE (OUTPATIENT)
Dept: LAB | Facility: CLINIC | Age: 52
End: 2017-11-28

## 2017-11-28 DIAGNOSIS — Z13.6 CARDIOVASCULAR SCREENING; LDL GOAL LESS THAN 160: Primary | ICD-10-CM

## 2017-11-28 DIAGNOSIS — R73.09 ABNORMAL GLUCOSE: ICD-10-CM

## 2017-11-28 DIAGNOSIS — Z79.899 MEDICATION MANAGEMENT: ICD-10-CM

## 2017-11-29 DIAGNOSIS — Z13.6 CARDIOVASCULAR SCREENING; LDL GOAL LESS THAN 160: ICD-10-CM

## 2017-11-29 DIAGNOSIS — Z79.899 MEDICATION MANAGEMENT: ICD-10-CM

## 2017-11-29 DIAGNOSIS — Z79.899 ENCOUNTER FOR LONG-TERM CURRENT USE OF MEDICATION: Primary | ICD-10-CM

## 2017-11-29 DIAGNOSIS — G47.9 SLEEP DISORDER: ICD-10-CM

## 2017-11-29 DIAGNOSIS — F31.0 BIPOLAR I DISORDER, MOST RECENT EPISODE HYPOMANIC (H): ICD-10-CM

## 2017-11-29 LAB
ALT SERPL W P-5'-P-CCNC: 26 U/L (ref 0–70)
AST SERPL W P-5'-P-CCNC: 13 U/L (ref 0–45)
CHOLEST SERPL-MCNC: 227 MG/DL
GLUCOSE SERPL-MCNC: 75 MG/DL (ref 70–99)
HBA1C MFR BLD: 5.4 % (ref 4.3–6)
HDLC SERPL-MCNC: 45 MG/DL
LDLC SERPL CALC-MCNC: 138 MG/DL
NONHDLC SERPL-MCNC: 182 MG/DL
TRIGL SERPL-MCNC: 222 MG/DL
VALPROATE SERPL-MCNC: 44 MG/L (ref 50–100)

## 2017-11-29 PROCEDURE — 80164 ASSAY DIPROPYLACETIC ACD TOT: CPT | Performed by: PSYCHIATRY & NEUROLOGY

## 2017-11-29 PROCEDURE — 83036 HEMOGLOBIN GLYCOSYLATED A1C: CPT | Performed by: FAMILY MEDICINE

## 2017-11-29 PROCEDURE — 84450 TRANSFERASE (AST) (SGOT): CPT | Performed by: PSYCHIATRY & NEUROLOGY

## 2017-11-29 PROCEDURE — 36415 COLL VENOUS BLD VENIPUNCTURE: CPT | Performed by: FAMILY MEDICINE

## 2017-11-29 PROCEDURE — 84460 ALANINE AMINO (ALT) (SGPT): CPT | Performed by: PSYCHIATRY & NEUROLOGY

## 2017-11-29 PROCEDURE — 80061 LIPID PANEL: CPT | Performed by: FAMILY MEDICINE

## 2017-11-29 PROCEDURE — 82947 ASSAY GLUCOSE BLOOD QUANT: CPT | Performed by: PSYCHIATRY & NEUROLOGY

## 2018-02-12 ENCOUNTER — OFFICE VISIT (OUTPATIENT)
Dept: FAMILY MEDICINE | Facility: CLINIC | Age: 53
End: 2018-02-12
Payer: COMMERCIAL

## 2018-02-12 VITALS
SYSTOLIC BLOOD PRESSURE: 148 MMHG | HEART RATE: 80 BPM | BODY MASS INDEX: 33.19 KG/M2 | HEIGHT: 67 IN | DIASTOLIC BLOOD PRESSURE: 100 MMHG | WEIGHT: 211.5 LBS

## 2018-02-12 DIAGNOSIS — L98.9 SKIN LESION: ICD-10-CM

## 2018-02-12 DIAGNOSIS — I10 HYPERTENSION, GOAL BELOW 140/90: Primary | ICD-10-CM

## 2018-02-12 DIAGNOSIS — F31.9 BIPOLAR AFFECTIVE DISORDER, REMISSION STATUS UNSPECIFIED (H): ICD-10-CM

## 2018-02-12 LAB
ANION GAP SERPL CALCULATED.3IONS-SCNC: 2 MMOL/L (ref 3–14)
BUN SERPL-MCNC: 11 MG/DL (ref 7–30)
CALCIUM SERPL-MCNC: 9.2 MG/DL (ref 8.5–10.1)
CHLORIDE SERPL-SCNC: 105 MMOL/L (ref 94–109)
CO2 SERPL-SCNC: 31 MMOL/L (ref 20–32)
CREAT SERPL-MCNC: 0.94 MG/DL (ref 0.66–1.25)
CREAT UR-MCNC: 61 MG/DL
GFR SERPL CREATININE-BSD FRML MDRD: 84 ML/MIN/1.7M2
GLUCOSE SERPL-MCNC: 90 MG/DL (ref 70–99)
MICROALBUMIN UR-MCNC: <5 MG/L
MICROALBUMIN/CREAT UR: NORMAL MG/G CR (ref 0–17)
POTASSIUM SERPL-SCNC: 4 MMOL/L (ref 3.4–5.3)
SODIUM SERPL-SCNC: 138 MMOL/L (ref 133–144)

## 2018-02-12 PROCEDURE — 99214 OFFICE O/P EST MOD 30 MIN: CPT | Mod: 25 | Performed by: FAMILY MEDICINE

## 2018-02-12 PROCEDURE — 11200 RMVL SKIN TAGS UP TO&INC 15: CPT | Performed by: FAMILY MEDICINE

## 2018-02-12 PROCEDURE — 80048 BASIC METABOLIC PNL TOTAL CA: CPT | Performed by: FAMILY MEDICINE

## 2018-02-12 PROCEDURE — 36415 COLL VENOUS BLD VENIPUNCTURE: CPT | Performed by: FAMILY MEDICINE

## 2018-02-12 PROCEDURE — 82043 UR ALBUMIN QUANTITATIVE: CPT | Performed by: FAMILY MEDICINE

## 2018-02-12 RX ORDER — LOSARTAN POTASSIUM AND HYDROCHLOROTHIAZIDE 12.5; 1 MG/1; MG/1
1 TABLET ORAL DAILY
Qty: 90 TABLET | Refills: 1 | Status: SHIPPED | OUTPATIENT
Start: 2018-02-12 | End: 2018-10-05

## 2018-02-12 ASSESSMENT — ANXIETY QUESTIONNAIRES
7. FEELING AFRAID AS IF SOMETHING AWFUL MIGHT HAPPEN: NOT AT ALL
1. FEELING NERVOUS, ANXIOUS, OR ON EDGE: SEVERAL DAYS
2. NOT BEING ABLE TO STOP OR CONTROL WORRYING: SEVERAL DAYS
GAD7 TOTAL SCORE: 6
5. BEING SO RESTLESS THAT IT IS HARD TO SIT STILL: SEVERAL DAYS
6. BECOMING EASILY ANNOYED OR IRRITABLE: SEVERAL DAYS
3. WORRYING TOO MUCH ABOUT DIFFERENT THINGS: SEVERAL DAYS

## 2018-02-12 ASSESSMENT — PATIENT HEALTH QUESTIONNAIRE - PHQ9: 5. POOR APPETITE OR OVEREATING: SEVERAL DAYS

## 2018-02-12 NOTE — LETTER
My Depression Action Plan  Name: Edis Burroughs   Date of Birth 1965  Date: 2/12/2018    My doctor: Ashwini Jessica   My clinic: [address] [address] [phone]          GREEN    ZONE   Good Control    What it looks like:     Things are going generally well. You have normal up s and down s. You may even feel depressed from time to time, but bad moods usually last less than a day.   What you need to do:  1. Continue to care for yourself (see self care plan)  2. Check your depression survival kit and update it as needed  3. Follow your physician s recommendations including any medication.  4. Do not stop taking medication unless you consult with your physician first.           YELLOW         ZONE Getting Worse    What it looks like:     Depression is starting to interfere with your life.     It may be hard to get out of bed; you may be starting to isolate yourself from others.    Symptoms of depression are starting to last most all day and this has happened for several days.     You may have suicidal thoughts but they are not constant.   What you need to do:     1. Call your care team, your response to treatment will improve if you keep your care team informed of your progress. Yellow periods are signs an adjustment may need to be made.     2. Continue your self-care, even if you have to fake it!    3. Talk to someone in your support network    4. Open up your depression survival kit           RED    ZONE Medical Alert - Get Help    What it looks like:     Depression is seriously interfering with your life.     You may experience these or other symptoms: You can t get out of bed most days, can t work or engage in other necessary activities, you have trouble taking care of basic hygiene, or basic responsibilities, thoughts of suicide or death that will not go away, self-injurious behavior.     What you need to do:  1. Call your care team and  request a same-day appointment. If they are not available (weekends or after hours) call your local crisis line, emergency room or 911.      Electronically signed by: Altagracia Acuna, February 12, 2018    Depression Self Care Plan / Survival Kit    Self-Care for Depression  Here s the deal. Your body and mind are really not as separate as most people think.  What you do and think affects how you feel and how you feel influences what you do and think. This means if you do things that people who feel good do, it will help you feel better.  Sometimes this is all it takes.  There is also a place for medication and therapy depending on how severe your depression is, so be sure to consult with your medical provider and/ or Behavioral Health Consultant if your symptoms are worsening or not improving.     In order to better manage my stress, I will:    Exercise  Get some form of exercise, every day. This will help reduce pain and release endorphins, the  feel good  chemicals in your brain. This is almost as good as taking antidepressants!  This is not the same as joining a gym and then never going! (they count on that by the way ) It can be as simple as just going for a walk or doing some gardening, anything that will get you moving.      Hygiene   Maintain good hygiene (Get out of bed in the morning, Make your bed, Brush your teeth, Take a shower, and Get dressed like you were going to work, even if you are unemployed).  If your clothes don't fit try to get ones that do.    Diet  I will strive to eat foods that are good for me, drink plenty of water, and avoid excessive sugar, caffeine, alcohol, and other mood-altering substances.  Some foods that are helpful in depression are: complex carbohydrates, B vitamins, flaxseed, fish or fish oil, fresh fruits and vegetables.    Psychotherapy  I agree to participate in Individual Therapy (if recommended).    Medication  If prescribed medications, I agree to take them.  Missing  doses can result in serious side effects.  I understand that drinking alcohol, or other illicit drug use, may cause potential side effects.  I will not stop my medication abruptly without first discussing it with my provider.    Staying Connected With Others  I will stay in touch with my friends, family members, and my primary care provider/team.    Use your imagination  Be creative.  We all have a creative side; it doesn t matter if it s oil painting, sand castles, or mud pies! This will also kick up the endorphins.    Witness Beauty  (AKA stop and smell the roses) Take a look outside, even in mid-winter. Notice colors, textures. Watch the squirrels and birds.     Service to others  Be of service to others.  There is always someone else in need.  By helping others we can  get out of ourselves  and remember the really important things.  This also provides opportunities for practicing all the other parts of the program.    Humor  Laugh and be silly!  Adjust your TV habits for less news and crime-drama and more comedy.    Control your stress  Try breathing deep, massage therapy, biofeedback, and meditation. Find time to relax each day.     My support system    Clinic Contact:  Phone number:    Contact 1:  Phone number:    Contact 2:  Phone number:    Anabaptism/:  Phone number:    Therapist:  Phone number:    Local crisis center:    Phone number:    Other community support:  Phone number:

## 2018-02-12 NOTE — LETTER
February 15, 2018      Edis Giangner  [address] [place] [address]        Dear ,    Your blood tests show that you have normal kidney function, and there is no signs of diabetes.     Your urine test for protein was normal, no signs of excessive protein in your urine. If elevated, this can be a sign of kidney damage. This test is recommended yearly for people with high blood pressure.     Hopefully, the new blood pressure medication will help lower your blood pressure readings.  As we talked about, I would recommend a follow-up appointment in 1 month.     Resulted Orders   Basic metabolic panel  (Ca, Cl, CO2, Creat, Gluc, K, Na, BUN)   Result Value Ref Range    Sodium 138 133 - 144 mmol/L    Potassium 4.0 3.4 - 5.3 mmol/L    Chloride 105 94 - 109 mmol/L    Carbon Dioxide 31 20 - 32 mmol/L    Anion Gap 2 (L) 3 - 14 mmol/L    Glucose 90 70 - 99 mg/dL      Comment:      Non Fasting    Urea Nitrogen 11 7 - 30 mg/dL    Creatinine 0.94 0.66 - 1.25 mg/dL    GFR Estimate 84 >60 mL/min/1.7m2      Comment:      Non  GFR Calc    GFR Estimate If Black >90 >60 mL/min/1.7m2      Comment:       GFR Calc    Calcium 9.2 8.5 - 10.1 mg/dL   Albumin Random Urine Quantitative with Creat Ratio   Result Value Ref Range    Creatinine Urine 61 mg/dL    Albumin Urine mg/L <5 mg/L    Albumin Urine mg/g Cr Unable to calculate due to low value 0 - 17 mg/g Cr       If you have any questions or concerns, please call the clinic at the number listed above.       Sincerely,        Ashwini Jessica MD / azael

## 2018-02-12 NOTE — NURSING NOTE
"Chief Complaint   Patient presents with     Hypertension       Initial BP (!) 148/100  Pulse 80  Ht 5' 7\" (1.702 m)  Wt 211 lb 8 oz (95.9 kg)  BMI 33.13 kg/m2 Estimated body mass index is 33.13 kg/(m^2) as calculated from the following:    Height as of this encounter: 5' 7\" (1.702 m).    Weight as of this encounter: 211 lb 8 oz (95.9 kg).  Medication Reconciliation: complete  "

## 2018-02-12 NOTE — PATIENT INSTRUCTIONS
*  Continue seeing the psychiatry and your counselor. Takes time.     *   The blood pressure is up, will another medication: HCTZ.     *   This would be a combination pill.     *    Follow up in one month.

## 2018-02-12 NOTE — MR AVS SNAPSHOT
"              After Visit Summary   2/12/2018    Edis Burroughs    MRN: [id number]           Patient Information     Date Of Birth          1965        Visit Information        Provider Department      2/12/2018 10:40 AM Ashwini Jessica MD [facility]        Today's Diagnoses     Hypertension, goal below 140/90    -  1    Bipolar affective disorder, remission status unspecified (H)          Care Instructions    *  Continue seeing the psychiatry and your counselor. Takes time.     *   The blood pressure is up, will another medication: HCTZ.     *   This would be a combination pill.     *    Follow up in one month.               Follow-ups after your visit        Follow-up notes from your care team     Return in about 1 month (around 3/12/2018).      Who to contact     Normal or non-critical lab and imaging results will be communicated to you by [employer], letter or phone within 4 business days after the clinic has received the results. If you do not hear from us within 7 days, please contact the clinic through [employer] or phone. If you have a critical or abnormal lab result, we will notify you by phone as soon as possible.  Submit refill requests through [employer] or call your pharmacy and they will forward the refill request to us. Please allow 3 business days for your refill to be completed.          If you need to speak with a  for additional information , please call: [phone]           Additional Information About Your Visit        [employer] Information     [employer] lets you send messages to your doctor, view your test results, renew your prescriptions, schedule appointments and more. To sign up, go to [email or web address] . Click on \"Log in\" on the left side of the screen, which will take you to the Welcome page. Then click on \"Sign up Now\" on the right side of the page.     You will be asked to enter the access code listed below, as well as some personal information. " "Please follow the directions to create your username and password.     Your access code is: [email or web address]  Expires: 2018 11:15 AM     Your access code will  in 90 days. If you need help or a new code, please call your [facility] clinic or [phone].        Care EveryWhere ID     This is your Care EveryWhere ID. This could be used by other organizations to access your [facility] medical records  [id number]        Your Vitals Were     Pulse Height BMI (Body Mass Index)             80 5' 7\" (1.702 m) 33.13 kg/m2          Blood Pressure from Last 3 Encounters:   18 (!) 148/100   17 (!) 169/98   02/15/17 148/90    Weight from Last 3 Encounters:   18 211 lb 8 oz (95.9 kg)   17 205 lb (93 kg)   02/15/17 205 lb (93 kg)              We Performed the Following     Albumin Random Urine Quantitative with Creat Ratio     Basic metabolic panel  (Ca, Cl, CO2, Creat, Gluc, K, Na, BUN)     DEPRESSION ACTION PLAN (DAP)          Today's Medication Changes          These changes are accurate as of 18 11:15 AM.  If you have any questions, ask your nurse or doctor.               Start taking these medicines.        Dose/Directions    losartan-hydrochlorothiazide 100-12.5 MG per tablet   Commonly known as:  HYZAAR   Used for:  Hypertension, goal below 140/90   Started by:  Ashwini Jessica MD        Dose:  1 tablet   Take 1 tablet by mouth daily   Quantity:  90 tablet   Refills:  1            Where to get your medicines      These medications were sent to [facility] [place]  [place], [place] - [address]  [address] [facility] [address] [facility] [address]     Phone:  [phone]     losartan-hydrochlorothiazide 100-12.5 MG per tablet                Primary Care Provider Office Phone # Fax #    Ashwini Jessica [phone] [address]  [place] [facility] [address]        Equal Access to Services     TORY KAN AH: Hadii edil rossi Sopayal, waaxda luqadaha, qaybta " chuckie dubosegabe Paredes [facility] [phone].    ATENCIÓN: Si cameron mccormick, tiene a giordano disposición servicios gratuitos de asistencia lingüística. Prakash al [phone].    We comply with applicable federal civil rights laws and [place] laws. We do not discriminate on the basis of race, color, national origin, age, disability, sex, sexual orientation, or gender identity.            Thank you!     Thank you for choosing [facility]  for your care. Our goal is always to provide you with excellent care. Hearing back from our patients is one way we can continue to improve our services. Please take a few minutes to complete the written survey that you may receive in the mail after your visit with us. Thank you!             Your Updated Medication List - Protect others around you: Learn how to safely use, store and throw away your medicines at www.disposemymeds.org.          This list is accurate as of 2/12/18 11:15 AM.  Always use your most recent med list.                   Brand Name Dispense Instructions for use Diagnosis    divalproex sodium extended-release 500 MG 24 hr tablet    DEPAKOTE ER     Take 500 mg by mouth daily.        LamoTRIgine 300 MG Tb24    LaMICtal     Take  by mouth. One daily        losartan 50 MG tablet    COZAAR    30 tablet    Take 1 tablet (50 mg) by mouth daily    Benign essential hypertension       losartan-hydrochlorothiazide 100-12.5 MG per tablet    HYZAAR    90 tablet    Take 1 tablet by mouth daily    Hypertension, goal below 140/90       OLANZapine 20 MG tablet    zyPREXA          SEROQUEL 300 MG tablet   Generic drug:  QUEtiapine      Take 300 mg by mouth. TID qhs        valACYclovir 1000 mg tablet    VALTREX    21 tablet    Take 1 tablet (1,000 mg) by mouth 3 times daily    Herpes zoster without complication       ZOLOFT 100 MG tablet   Generic drug:  sertraline     60 Tab    2 TABLET DAILY    Depressive disorder, not elsewhere  classified

## 2018-02-12 NOTE — PROGRESS NOTES
"  SUBJECTIVE:   Edis Burroughs is a 52 year old male who presents to clinic today for the following health issues:      Hypertension Follow-up  Losartan 50mg qd    Outpatient blood pressures are being checked at home.  Results are 150/100s.    Low Salt Diet: low salt    - BP readings at home are consistent with today's reading.     - Remove skin tags from under right eye.    - Patient has not completed colonoscopy in the past, he did send FIT testing card in through [employer] last week, he has not received results back yet. He denies any family history of colon cancer in first degree relative.       Amount of exercise or physical activity: walking daily with dog    Problems taking medications regularly: No    Medication side effects: none    Diet: low fat/cholesterol        ROS:  Constitutional, HEENT, cardiovascular, pulmonary, gi and gu systems are negative, except as otherwise noted.    This document serves as a record of the services and decisions personally performed and made by Ashwini Jessica MD. It was created on his behalf by Sonu Garcia, a trained medical scribe. The creation of this document is based the provider's statements to the medical scribe.  Sonu Garcia 10:54 AM February 12, 2018  OBJECTIVE:   BP (!) 148/100  Pulse 80  Ht 5' 7\" (1.702 m)  Wt 211 lb 8 oz (95.9 kg)  BMI 33.13 kg/m2  Body mass index is 33.13 kg/(m^2).       GENERAL: healthy, alert and no distress  EYES: Eyes grossly normal to inspection, conjunctivae and sclerae normal  MS: no gross musculoskeletal defects noted, no edema  SKIN: 2 mm pedunculated, skin tag noted on the mid lower right eyelid  NEURO: Normal strength and tone, mentation intact and speech normal  PSYCH: mentation appears normal, affect normal/bright          ASSESSMENT/PLAN:     (I10) Hypertension, goal below 140/90  (primary encounter diagnosis)  Comment: BP elevated with ARB. Will add diuretic to manage BP. Recheck in 1 month. Will check renal function, " results pending. Primary prevention.   Plan: Basic metabolic panel  (Ca, Cl, CO2, Creat,         Gluc, K, Na, BUN), Albumin Random Urine         Quantitative with Creat Ratio,  losartan-hydrochlorothiazide (HYZAAR) 100-12.5         MG per tablet            (F31.9) Bipolar affective disorder, remission status unspecified (H)  Comment: Stable. Patient follows up with counseling and psychiatry about every 6 months.  Patient is on 5 drug therapy including mood stabilizing Depakote, SSRI,  and 3 atypical antipsychotics.  Denies significant side effects.  Plan: DEPRESSION ACTION PLAN (DAP). Continue current medications.       (L98.9) Skin lesion  Comment: probably tag, will treat and see if the lesion resolves.   Plan: DESTRUCT PREMALIGNANT LESION, FIRST        The skin lesion was grasped with forceps and then treated with liquid nitrogen, duration: 3-5 seconds, x one. Care was taken to avoid the cornea.   If the lesion resolves, this would be consistent with a skin tag, if persists, advised follow up for biopsy.               Patient Instructions   *  Continue seeing the psychiatry and your counselor. Takes time.     *   The blood pressure is up, will another medication: HCTZ.     *   This would be a combination pill.     *    Follow up in one month.         Patient will follow up in 1 month or sooner, PRN. Patient instructed to call with any questions or concerns.      The information in this document, created by a scribe for me, accurately reflects the services I personally performed and the decisions made by me. I have reviewed and approved this document for accuracy. 10:55 AM 2/12/2018      Ashwini Jessica MD  [facility]

## 2018-02-13 ASSESSMENT — PATIENT HEALTH QUESTIONNAIRE - PHQ9: SUM OF ALL RESPONSES TO PHQ QUESTIONS 1-9: 15

## 2018-02-13 ASSESSMENT — ANXIETY QUESTIONNAIRES: GAD7 TOTAL SCORE: 6

## 2018-03-12 ENCOUNTER — OFFICE VISIT (OUTPATIENT)
Dept: FAMILY MEDICINE | Facility: CLINIC | Age: 53
End: 2018-03-12
Payer: COMMERCIAL

## 2018-03-12 VITALS
HEART RATE: 76 BPM | WEIGHT: 214 LBS | BODY MASS INDEX: 33.59 KG/M2 | DIASTOLIC BLOOD PRESSURE: 88 MMHG | HEIGHT: 67 IN | SYSTOLIC BLOOD PRESSURE: 126 MMHG

## 2018-03-12 DIAGNOSIS — F31.9 BIPOLAR AFFECTIVE DISORDER, REMISSION STATUS UNSPECIFIED (H): ICD-10-CM

## 2018-03-12 DIAGNOSIS — I10 HYPERTENSION, GOAL BELOW 140/90: Primary | ICD-10-CM

## 2018-03-12 DIAGNOSIS — Z13.6 CARDIOVASCULAR SCREENING; LDL GOAL LESS THAN 160: ICD-10-CM

## 2018-03-12 PROCEDURE — 80061 LIPID PANEL: CPT | Performed by: FAMILY MEDICINE

## 2018-03-12 PROCEDURE — 99213 OFFICE O/P EST LOW 20 MIN: CPT | Performed by: FAMILY MEDICINE

## 2018-03-12 PROCEDURE — 80048 BASIC METABOLIC PNL TOTAL CA: CPT | Performed by: FAMILY MEDICINE

## 2018-03-12 PROCEDURE — 36415 COLL VENOUS BLD VENIPUNCTURE: CPT | Performed by: FAMILY MEDICINE

## 2018-03-12 NOTE — PATIENT INSTRUCTIONS
*  Your blood pressure is better.     *  Stay on these medications.     *   Will check cholesterol and kidney function today.     *   Follow up in 6 months.

## 2018-03-12 NOTE — PROGRESS NOTES
"  SUBJECTIVE:   Edis Burroughs is a 52 year old male who presents to clinic today for the following health issues:      Hypertension Follow-up  Losartan-hydrochlorothiazide 100-12.5mg qd    Outpatient blood pressures are not being checked.    Low Salt Diet: no added salt    BP Readings from Last 6 Encounters:   03/12/18 128/90   02/12/18 (!) 148/100   03/20/17 (!) 169/98   02/15/17 148/90   12/14/16 138/80   11/22/16 130/88       Depression and Anxiety Follow-Up  Seroquel 600mg qhs, depakote 500mg qd, lamotrigine 300mg qd, zoloft 200mg qd    Status since last visit: No change    Other associated symptoms:None    Complicating factors:     Significant life event: No     Current substance abuse: None    PHQ-9 12/14/2016 2/15/2017 2/12/2018   Total Score 19 18 15   Q9: Suicide Ideation Not at all Not at all Not at all     RACHELL-7 SCORE 12/14/2016 2/15/2017 2/12/2018   Total Score 11 12 6         PHQ-9  English  PHQ-9   Any Language  RACHELL-7  Suicide Assessment Five-step Evaluation and Treatment (SAFE-T)    - Patient states that he has completed FIT testing in the past about a month ago, he is unsure of results. He completed this through [facility] He denies any family history of colon cancer in first degree relative.       Amount of exercise or physical activity: Walking with dog when weather is nice    Problems taking medications regularly: No    Medication side effects: none    Diet: watching what he eats      Problem list and histories reviewed & adjusted, as indicated.  Additional history: as documented      Reviewed and updated as needed this visit by clinical staff       Reviewed and updated as needed this visit by Provider         ROS:  Constitutional, HEENT, cardiovascular, pulmonary, gi and gu systems are negative, except as otherwise noted.    OBJECTIVE:     /88  Pulse 76  Ht 5' 7\" (1.702 m)  Wt 214 lb (97.1 kg)  BMI 33.52 kg/m2  Body mass index is 33.52 kg/(m^2).  GENERAL: Healthy, alert " and no distress  EYES: Eyes grossly normal to inspection, conjunctivae and sclerae normal.  Entire skin check resolved.  RESP: Lungs clear to auscultation - no rales, rhonchi or wheezes  CV: Regular rate and rhythm, normal S1 S2, no murmur  MS: No gross musculoskeletal defects noted, no edema  NEURO: Normal strength and tone, mentation intact and speech normal  PSYCH: Mentation appears normal, affect normal/bright     Results for orders placed or performed in visit on 03/12/18   Lipid panel reflex to direct LDL Non-fasting   Result Value Ref Range    Cholesterol 201 (H) <200 mg/dL    Triglycerides 201 (H) <150 mg/dL    HDL Cholesterol 37 (L) >39 mg/dL    LDL Cholesterol Calculated 124 (H) <100 mg/dL    Non HDL Cholesterol 164 (H) <130 mg/dL   Basic metabolic panel   Result Value Ref Range    Sodium 139 133 - 144 mmol/L    Potassium 4.0 3.4 - 5.3 mmol/L    Chloride 106 94 - 109 mmol/L    Carbon Dioxide 27 20 - 32 mmol/L    Anion Gap 6 3 - 14 mmol/L    Glucose 81 70 - 99 mg/dL    Urea Nitrogen 11 7 - 30 mg/dL    Creatinine 0.94 0.66 - 1.25 mg/dL    GFR Estimate 84 >60 mL/min/1.7m2    GFR Estimate If Black >90 >60 mL/min/1.7m2    Calcium 8.8 8.5 - 10.1 mg/dL      ASSESSMENT/PLAN:     (I10) Hypertension, goal below 140/90  (primary encounter diagnosis)  Comment: Within guidelines from 2 drug therapy, primary prevention, renal function and potassium normal.  Plan: Basic metabolic panel        Continue, 1 year refill.    (Z13.6) CARDIOVASCULAR SCREENING; LDL GOAL LESS THAN 160  Comment: Lipid panel acceptable, elevated LDL and total.  At this point, no clear benefit from statin therapy, will monitor.  Plan: Lipid panel reflex to direct LDL Non-fasting        Recheck lipid panel in 1-2 years.    (F31.9) Bipolar affective disorder, remission status unspecified (H)  Comment: Appears to be doing well on current medications  Plan: Continue with current medications, follow-up per psychiatry          *  Your blood pressure is  better.     *  Stay on these medications.     *   Will check cholesterol and kidney function today.     *   Follow up in 6 months.           Ashwini Jessica MD  [facility]

## 2018-03-12 NOTE — MR AVS SNAPSHOT
"              After Visit Summary   3/12/2018    Edis Burroughs    MRN: [id number]           Patient Information     Date Of Birth          1965        Visit Information        Provider Department      3/12/2018 2:40 PM Ashwini Jessica MD [facility]        Today's Diagnoses     CARDIOVASCULAR SCREENING; LDL GOAL LESS THAN 160    -  1    Hypertension, goal below 140/90          Care Instructions    *  Your blood pressure is better.     *  Stay on these medications.     *   Will check cholesterol and kidney function today.     *   Follow up in 6 months.          Follow-ups after your visit        Follow-up notes from your care team     Return in about 6 months (around 9/12/2018).      Who to contact     Normal or non-critical lab and imaging results will be communicated to you by SEE Forgehart, letter or phone within 4 business days after the clinic has received the results. If you do not hear from us within 7 days, please contact the clinic through SEE Forgehart or phone. If you have a critical or abnormal lab result, we will notify you by phone as soon as possible.  Submit refill requests through SkillsTrak or call your pharmacy and they will forward the refill request to us. Please allow 3 business days for your refill to be completed.          If you need to speak with a  for additional information , please call: [phone]           Additional Information About Your Visit        SkillsTrak Information     SkillsTrak lets you send messages to your doctor, view your test results, renew your prescriptions, schedule appointments and more. To sign up, go to [email or web address] . Click on \"Log in\" on the left side of the screen, which will take you to the Welcome page. Then click on \"Sign up Now\" on the right side of the page.     You will be asked to enter the access code listed below, as well as some personal information. Please follow the directions to create your username and " "password.     Your access code is: [email or web address]  Expires: 2018 12:15 PM     Your access code will  in 90 days. If you need help or a new code, please call your [place] clinic or [phone].        Care EveryWhere ID     This is your Care EveryWhere ID. This could be used by other organizations to access your [place] medical records  [id number]        Your Vitals Were     Pulse Height BMI (Body Mass Index)             76 5' 7\" (1.702 m) 33.52 kg/m2          Blood Pressure from Last 3 Encounters:   18 126/88   18 (!) 148/100   17 (!) 169/98    Weight from Last 3 Encounters:   18 214 lb (97.1 kg)   18 211 lb 8 oz (95.9 kg)   17 205 lb (93 kg)              We Performed the Following     Basic metabolic panel     Lipid panel reflex to direct LDL Non-fasting        Primary Care Provider Office Phone # Fax #    Ashwini Jessica [phone] [address] [place] [address]        Equal Access to Services     [facility]: Hadii aad ku hadasho Soomaali, waaxda luqadaha, qaybta kaalmada adeegyada, waxay lauren haysaudn alexandra chapman ah. So [facility] [phone].    ATENCIÓN: Si habla español, tiene a giordano disposición servicios gratuitos de asistencia lingüística. Llame al [phone].    We comply with applicable federal civil rights laws and [place] laws. We do not discriminate on the basis of race, color, national origin, age, disability, sex, sexual orientation, or gender identity.            Thank you!     Thank you for choosing [facility] [place]  for your care. Our goal is always to provide you with excellent care. Hearing back from our patients is one way we can continue to improve our services. Please take a few minutes to complete the written survey that you may receive in the mail after your visit with us. Thank you!             Your Updated Medication List - Protect others around you: Learn how to safely use, store and throw " away your medicines at www.disposemymeds.org.          This list is accurate as of 3/12/18  2:56 PM.  Always use your most recent med list.                   Brand Name Dispense Instructions for use Diagnosis    divalproex sodium extended-release 500 MG 24 hr tablet    DEPAKOTE ER     Take 500 mg by mouth daily.        LamoTRIgine 300 MG Tb24    LaMICtal     Take  by mouth. One daily        losartan-hydrochlorothiazide 100-12.5 MG per tablet    HYZAAR    90 tablet    Take 1 tablet by mouth daily    Hypertension, goal below 140/90       OLANZapine 20 MG tablet    zyPREXA          SEROQUEL 300 MG tablet   Generic drug:  QUEtiapine      Take 600 mg by mouth At Bedtime TID qhs        valACYclovir 1000 mg tablet    VALTREX    21 tablet    Take 1 tablet (1,000 mg) by mouth 3 times daily    Herpes zoster without complication       ZOLOFT 100 MG tablet   Generic drug:  sertraline     60 Tab    2 TABLET DAILY    Depressive disorder, not elsewhere classified

## 2018-03-12 NOTE — LETTER
March 19, 2018      Edis Burroughs  [address] [place] [address]        Dear ,    Your blood test show that you have normal kidney function asof diabetes. Please see enclosed copies.     Your cholesterol is acceptable. At this time, there is no strong reason for cholesterol lowering medication. I would recommend that you focus on lifestyle, and had a cholesterol rechecked in 1-2 years.    Resulted Orders   Lipid panel reflex to direct LDL Non-fasting   Result Value Ref Range    Cholesterol 201 (H) <200 mg/dL      Comment:      Desirable:       <200 mg/dl    Triglycerides 201 (H) <150 mg/dL      Comment:      Borderline high:  150-199 mg/dl  High:             200-499 mg/dl  Very high:       >499 mg/dl  Non Fasting      HDL Cholesterol 37 (L) >39 mg/dL    LDL Cholesterol Calculated 124 (H) <100 mg/dL      Comment:      Above desirable:  100-129 mg/dl  Borderline High:  130-159 mg/dL  High:             160-189 mg/dL  Very high:       >189 mg/dl      Non HDL Cholesterol 164 (H) <130 mg/dL      Comment:      Above Desirable:  130-159 mg/dl  Borderline high:  160-189 mg/dl  High:             190-219 mg/dl  Very high:       >219 mg/dl     Basic metabolic panel   Result Value Ref Range    Sodium 139 133 - 144 mmol/L    Potassium 4.0 3.4 - 5.3 mmol/L    Chloride 106 94 - 109 mmol/L    Carbon Dioxide 27 20 - 32 mmol/L    Anion Gap 6 3 - 14 mmol/L    Glucose 81 70 - 99 mg/dL      Comment:      Non Fasting    Urea Nitrogen 11 7 - 30 mg/dL    Creatinine 0.94 0.66 - 1.25 mg/dL    GFR Estimate 84 >60 mL/min/1.7m2      Comment:      Non  GFR Calc    GFR Estimate If Black >90 >60 mL/min/1.7m2      Comment:       GFR Calc    Calcium 8.8 8.5 - 10.1 mg/dL       If you have any questions or concerns, please call the clinic at the number listed above.       Sincerely,        Ashwini Jessica MD / azael

## 2018-03-13 LAB
ANION GAP SERPL CALCULATED.3IONS-SCNC: 6 MMOL/L (ref 3–14)
BUN SERPL-MCNC: 11 MG/DL (ref 7–30)
CALCIUM SERPL-MCNC: 8.8 MG/DL (ref 8.5–10.1)
CHLORIDE SERPL-SCNC: 106 MMOL/L (ref 94–109)
CHOLEST SERPL-MCNC: 201 MG/DL
CO2 SERPL-SCNC: 27 MMOL/L (ref 20–32)
CREAT SERPL-MCNC: 0.94 MG/DL (ref 0.66–1.25)
GFR SERPL CREATININE-BSD FRML MDRD: 84 ML/MIN/1.7M2
GLUCOSE SERPL-MCNC: 81 MG/DL (ref 70–99)
HDLC SERPL-MCNC: 37 MG/DL
LDLC SERPL CALC-MCNC: 124 MG/DL
NONHDLC SERPL-MCNC: 164 MG/DL
POTASSIUM SERPL-SCNC: 4 MMOL/L (ref 3.4–5.3)
SODIUM SERPL-SCNC: 139 MMOL/L (ref 133–144)
TRIGL SERPL-MCNC: 201 MG/DL

## 2018-06-22 ENCOUNTER — OFFICE VISIT (OUTPATIENT)
Dept: FAMILY MEDICINE | Facility: CLINIC | Age: 53
End: 2018-06-22
Payer: COMMERCIAL

## 2018-06-22 VITALS
RESPIRATION RATE: 14 BRPM | TEMPERATURE: 97.8 F | DIASTOLIC BLOOD PRESSURE: 80 MMHG | HEART RATE: 72 BPM | BODY MASS INDEX: 31.39 KG/M2 | SYSTOLIC BLOOD PRESSURE: 128 MMHG | WEIGHT: 200.4 LBS

## 2018-06-22 DIAGNOSIS — S05.01XD INJ CONJUNCTIVA AND CORNEAL ABRASION W/O FB, RIGHT EYE, SUBS: Primary | ICD-10-CM

## 2018-06-22 PROCEDURE — 99213 OFFICE O/P EST LOW 20 MIN: CPT | Performed by: NURSE PRACTITIONER

## 2018-06-22 ASSESSMENT — PAIN SCALES - GENERAL: PAINLEVEL: MILD PAIN (3)

## 2018-06-22 NOTE — MR AVS SNAPSHOT
After Visit Summary   6/22/2018    Edis Burroughs    MRN: [id number]           Patient Information     Date Of Birth          1965        Visit Information        Provider Department      6/22/2018 10:20 AM Irina Shipman APRN CNP [facility]        Today's Diagnoses     Inj conjunctiva and corneal abrasion w/o fb, right eye, subs    -  1      Care Instructions    No rubbing the eye.  There is a speck of a foreign body in the right eye a the  3  O'clock area  On the eye     See opthalmology to have it removed     They will probably patch the eye ,  This will change your depth perception     Keep the patch on for  24-36 hours.                  Follow-ups after your visit        Additional Services     OPHTHALMOLOGY ADULT REFERRAL       Your provider has referred you to: [facility] [phone]   [email or web address]  [facility]: [facility] [phone]   [email or web address]  [place] [phone]   [email or web address]    Please be aware that coverage of these services is subject to the terms and limitations of your health insurance plan.  Call member services at your health plan with any benefit or coverage questions.      Please bring the following with you to your appointment:    (1) Any X-Rays, CTs or MRIs which have been performed.  Contact the facility where they were done to arrange for  prior to your scheduled appointment.    (2) List of current medications  (3) This referral request   (4) Any documents/labs given to you for this referral                  Your next 10 appointments already scheduled     Sep 12, 2018 11:00 AM CDT   SHORT with Ashwini Jessica MD   [facility] ([facility])    [address] [address] [phone]              Who to contact     Normal or non-critical lab and imaging results will be  "communicated to you by AdChoicehart, letter or phone within 4 business days after the clinic has received the results. If you do not hear from us within 7 days, please contact the clinic through FarmaciaClubt or phone. If you have a critical or abnormal lab result, we will notify you by phone as soon as possible.  Submit refill requests through NeighborMD or call your pharmacy and they will forward the refill request to us. Please allow 3 business days for your refill to be completed.          If you need to speak with a  for additional information , please call: [phone]           Additional Information About Your Visit        NeighborMD Information     NeighborMD lets you send messages to your doctor, view your test results, renew your prescriptions, schedule appointments and more. To sign up, go to [email or web address] . Click on \"Log in\" on the left side of the screen, which will take you to the Welcome page. Then click on \"Sign up Now\" on the right side of the page.     You will be asked to enter the access code listed below, as well as some personal information. Please follow the directions to create your username and password.     Your access code is: [email or web address]  Expires: 2018 10:09 AM     Your access code will  in 90 days. If you need help or a new code, please call your [place] clinic or [phone].        Care EveryWhere ID     This is your Care EveryWhere ID. This could be used by other organizations to access your [place] medical records  [id number]        Your Vitals Were     Pulse Temperature Respirations BMI (Body Mass Index)          72 97.8  F (36.6  C) (Tympanic) 14 31.39 kg/m2         Blood Pressure from Last 3 Encounters:   18 128/80   18 126/88   18 (!) 148/100    Weight from Last 3 Encounters:   18 200 lb 6.4 oz (90.9 kg)   18 214 lb (97.1 kg)   18 211 lb 8 oz (95.9 kg)              We Performed the Following     OPHTHALMOLOGY ADULT " REFERRAL        Primary Care Provider Office Phone # Fax #    Ashwini Jessica [phone] [address] [place] [address]        Equal Access to Services     [facility]ET[facility] : Hadjojo edil sauer elijaho Isabella, waaxda luqadaha, qaybta kaalmada adeegyada, chuckie thorpejules kenn. So [facility] [phone].    ATENCIÓN: Si habla español, tiene a giordano disposición servicios gratuitos de asistencia lingüística. Llame al [phone].    We comply with applicable federal civil rights laws and [place] laws. We do not discriminate on the basis of race, color, national origin, age, disability, sex, sexual orientation, or gender identity.            Thank you!     Thank you for choosing [facility]  for your care. Our goal is always to provide you with excellent care. Hearing back from our patients is one way we can continue to improve our services. Please take a few minutes to complete the written survey that you may receive in the mail after your visit with us. Thank you!             Your Updated Medication List - Protect others around you: Learn how to safely use, store and throw away your medicines at www.disposemymeds.org.          This list is accurate as of 6/22/18 10:45 AM.  Always use your most recent med list.                   Brand Name Dispense Instructions for use Diagnosis    divalproex sodium extended-release 500 MG 24 hr tablet    DEPAKOTE ER     Take 500 mg by mouth daily.        LamoTRIgine 300 MG Tb24    LaMICtal     Take  by mouth. One daily        losartan-hydrochlorothiazide 100-12.5 MG per tablet    HYZAAR    90 tablet    Take 1 tablet by mouth daily    Hypertension, goal below 140/90       OLANZapine 20 MG tablet    zyPREXA          SEROQUEL 300 MG tablet   Generic drug:  QUEtiapine      Take 600 mg by mouth At Bedtime TID qhs        valACYclovir 1000 mg tablet    VALTREX    21 tablet    Take 1 tablet (1,000 mg) by mouth 3 times daily    Herpes  zoster without complication       ZOLOFT 100 MG tablet   Generic drug:  sertraline     60 Tab    2 TABLET DAILY    Depressive disorder, not elsewhere classified

## 2018-06-22 NOTE — PROGRESS NOTES
SUBJECTIVE:   Edis Burroughs is a 52 year old male who presents to clinic today for the following health issues:      Eye(s) Problem  Onset: 2 days ago    Description:   Location: right  Pain: YES  Redness: YES    Accompanying Signs & Symptoms:  Discharge/mattering: YES- yesterday morning had quite a bit of mattering, today a little bit  Swelling: YES  Visual changes: YES- blurred vision in right eye  Fever: no  Nasal Congestion: no  Bothered by bright lights: YES    History:   Trauma: no, not that patient is aware of   Foreign body exposure: is not sure, had been outside in the garage and shortly after going inside noticed the discomfort in the eye    Precipitating factors:   Wearing contacts: no     Alleviating factors:  Improved by: Warm compress    Therapies Tried and outcome:   Warm compress - helps with some of the discomfort while being applied  Saline drops - will temporarily alleviate the discomfort          Problem list and histories reviewed & adjusted, as indicated.  Additional history: as documented    Patient Active Problem List   Diagnosis     Bipolar affective disorder, remission status unspecified (H)     CARDIOVASCULAR SCREENING; LDL GOAL LESS THAN 160     Hypertension, goal below 140/90     Past Surgical History:   Procedure Laterality Date     C APPENDECTOMY  1997     C LAMINECTOMY,FACETECTOMY,LUMBAR  2008       Social History   Substance Use Topics     Smoking status: Never Smoker     Smokeless tobacco: Never Used     Alcohol use 0.0 oz/week     0 Standard drinks or equivalent per week      Comment: rarely     Family History   Problem Relation Age of Onset     Prostate Cancer Father      HEART DISEASE Father      open heart surgery     Cancer Father      lung     C.A.D. Father      triple bypass, first MI age 59     Cancer Sister      leukemia     Diabetes Maternal Grandmother      FARRUKHA.AMISH. Maternal Grandmother      Diabetes Maternal Grandfather      FARRUKHAESCOBAR Maternal Grandfather       Cerebrovascular Disease Paternal Grandfather      BARTOLO.A.D. Paternal Grandfather      FARRUKHAWOODROW. Paternal Grandmother      Hypertension No family hx of      Breast Cancer No family hx of      Cancer - colorectal No family hx of          Current Outpatient Prescriptions   Medication Sig Dispense Refill     divalproex (DEPAKOTE) 500 MG 24 hr tablet Take 500 mg by mouth daily.       LamoTRIgine 300 MG TB24 Take  by mouth. One daily       losartan-hydrochlorothiazide (HYZAAR) 100-12.5 MG per tablet Take 1 tablet by mouth daily 90 tablet 1     OLANZapine (ZYPREXA) 20 MG tablet        QUEtiapine (SEROQUEL) 300 MG tablet Take 600 mg by mouth At Bedtime TID qhs       valACYclovir (VALTREX) 1000 mg tablet Take 1 tablet (1,000 mg) by mouth 3 times daily (Patient not taking: Reported on 6/22/2018) 21 tablet 0     ZOLOFT 100 MG OR TABS 2 TABLET DAILY 60 Tab 1     Allergies   Allergen Reactions     Vicodin [Acetaminophen] GI Disturbance     Stomach discomfort     BP Readings from Last 3 Encounters:   06/22/18 128/80   03/12/18 126/88   02/12/18 (!) 148/100    Wt Readings from Last 3 Encounters:   06/22/18 200 lb 6.4 oz (90.9 kg)   03/12/18 214 lb (97.1 kg)   02/12/18 211 lb 8 oz (95.9 kg)                    Reviewed and updated as needed this visit by clinical staff       Reviewed and updated as needed this visit by Provider         ROS:  CONSTITUTIONAL: NEGATIVE for fever, chills, change in weight  EYES: POSITIVE for foreign body sensation right,   See note above.   The pain will feel like a grinding in the eye  And then it will resolve .  Vision is good at times it si blurry and hard to keep the eye open .  Has been going on for 2 days.  Has been flushing the eye out but it is not working     OBJECTIVE:     /80 (BP Location: Left arm, Patient Position: Chair, Cuff Size: Adult Large)  Pulse 72  Temp 97.8  F (36.6  C) (Tympanic)  Resp 14  Wt 200 lb 6.4 oz (90.9 kg)  BMI 31.39 kg/m2  Body mass index is 31.39 kg/(m^2).    GENERAL: healthy, alert and no distress  EYES: EOMI, conjunctiva/corneas- conjunctival injection OD and eye was stained, with minimal uptake and  There is a speck of a foreign body    MS: no gross musculoskeletal defects noted, no edema    Diagnostic Test Results:  none     ASSESSMENT/PLAN:     ASSESSMENT/PLAN:      ICD-10-CM    1. Inj conjunctiva and corneal abrasion w/o fb, right eye, subs S05.01XD OPHTHALMOLOGY ADULT REFERRAL       Patient Instructions   No rubbing the eye.  There is a speck of a foreign body in the right eye a the  3  O'clock area  On the eye     See opthalmology to have it removed     They will probably patch the eye ,  This will change your depth perception     Keep the patch on for  24-36 hours.        Has appointment at the [facility]ine office early afternoon today   Did give him an eye patch to put on once he gets to his father's place   No eye drops as the ophthalmologist will need to examine the eye                 CONSULTATION/REFERRAL to opthalmology   See Patient Instructions    LINDA CAM NP, APRN CNP  [facility]

## 2018-06-22 NOTE — PATIENT INSTRUCTIONS
No rubbing the eye.  There is a speck of a foreign body in the right eye a the  3  O'clock area  On the eye     See opthalmology to have it removed     They will probably patch the eye ,  This will change your depth perception     Keep the patch on for  24-36 hours.

## 2018-08-06 ENCOUNTER — TRANSFERRED RECORDS (OUTPATIENT)
Dept: HEALTH INFORMATION MANAGEMENT | Facility: CLINIC | Age: 53
End: 2018-08-06

## 2018-08-06 LAB
ALT SERPL-CCNC: 15 U/L (ref 9–46)
AST SERPL-CCNC: 13 U/L (ref 10–35)
CHOLEST SERPL-MCNC: 186 MG/DL
HBA1C MFR BLD: 5.2 % (ref 0–5.7)
TRIGL SERPL-MCNC: 117 MG/DL

## 2018-10-05 DIAGNOSIS — I10 HYPERTENSION, GOAL BELOW 140/90: ICD-10-CM

## 2018-10-05 RX ORDER — LOSARTAN POTASSIUM AND HYDROCHLOROTHIAZIDE 12.5; 1 MG/1; MG/1
TABLET ORAL
Qty: 90 TABLET | Refills: 0 | Status: SHIPPED | OUTPATIENT
Start: 2018-10-05 | End: 2019-01-28 | Stop reason: DRUGHIGH

## 2018-10-05 NOTE — TELEPHONE ENCOUNTER
Prescription approved per [facility] Refill Protocol.  Maggi Montiel RN

## 2018-10-05 NOTE — TELEPHONE ENCOUNTER
"Requested Prescriptions   Pending Prescriptions Disp Refills     losartan-hydrochlorothiazide (HYZAAR) 100-12.5 MG per tablet [Pharmacy Med Name: LOSARTAN-HCTZ 100MG/12.5MG] 90 tablet 1    Last Written Prescription Date:  02/12/2018 #90 x 1  Last filled 06/22/2018  Last office visit: 6/22/2018 RADHA Shipman   Future Office Visit: None   Sig: TAKE ONE TABLET BY MOUTH EVERY DAY    Angiotensin-II Receptors Passed    10/5/2018  8:34 AM       Passed - Blood pressure under 140/90 in past 12 months    BP Readings from Last 3 Encounters:   06/22/18 128/80   03/12/18 126/88   02/12/18 (!) 148/100                Passed - Recent (12 mo) or future (30 days) visit within the authorizing provider's specialty    Patient had office visit in the last 12 months or has a visit in the next 30 days with authorizing provider or within the authorizing provider's specialty.  See \"Patient Info\" tab in inbasket, or \"Choose Columns\" in Meds & Orders section of the refill encounter.           Passed - Patient is age 18 or older       Passed - Normal serum creatinine on file in past 12 months    Recent Labs   Lab Test  03/12/18   1458   CR  0.94            Passed - Normal serum potassium on file in past 12 months    Recent Labs   Lab Test  03/12/18   1458   POTASSIUM  4.0                      "

## 2018-12-28 NOTE — PROGRESS NOTES
SUBJECTIVE:   Edis Burroughs is a 53 year old male who presents to clinic today for the following health issues:      *URI    Patient Active Problem List   Diagnosis     Bipolar affective disorder, remission status unspecified (H)     CARDIOVASCULAR SCREENING; LDL GOAL LESS THAN 160     Hypertension, goal below 140/90     Past Surgical History:   Procedure Laterality Date     C APPENDECTOMY  1997     C LAMINECTOMY,FACETECTOMY,LUMBAR  2008       Social History     Tobacco Use     Smoking status: Never Smoker     Smokeless tobacco: Never Used   Substance Use Topics     Alcohol use: Yes     Alcohol/week: 0.0 oz     Comment: rarely     Family History   Problem Relation Age of Onset     Prostate Cancer Father      Heart Disease Father         open heart surgery     Cancer Father         lung     C.A.D. Father         triple bypass, first MI age 59     Cancer Sister         leukemia     Diabetes Maternal Grandmother      C.A.D. Maternal Grandmother      Diabetes Maternal Grandfather      C.A.D. Maternal Grandfather      Cerebrovascular Disease Paternal Grandfather      C.A.D. Paternal Grandfather      C.A.D. Paternal Grandmother      Hypertension No family hx of      Breast Cancer No family hx of      Cancer - colorectal No family hx of          Current Outpatient Medications   Medication Sig Dispense Refill     azithromycin (ZITHROMAX) 250 MG tablet Take 2 tablets (500 mg) by mouth daily for 1 day, THEN 1 tablet (250 mg) daily for 4 days. 6 tablet 0     divalproex (DEPAKOTE) 500 MG 24 hr tablet Take 500 mg by mouth daily.       guaiFENesin-codeine (GUAIFENESIN AC) 100-10 MG/5ML syrup Take 5 mLs by mouth every 8 hours as needed for congestion or cough 118 mL 0     LamoTRIgine 300 MG TB24 Take  by mouth. One daily       losartan-hydrochlorothiazide (HYZAAR) 100-12.5 MG per tablet TAKE ONE TABLET BY MOUTH EVERY DAY 90 tablet 0     losartan-hydrochlorothiazide (HYZAAR) 100-25 MG tablet Take 1 tablet by mouth daily 90  tablet 3     OLANZapine (ZYPREXA) 20 MG tablet        QUEtiapine (SEROQUEL) 300 MG tablet Take 600 mg by mouth At Bedtime TID qhs       ZOLOFT 100 MG OR TABS 2 TABLET DAILY 60 Tab 1     valACYclovir (VALTREX) 1000 mg tablet Take 1 tablet (1,000 mg) by mouth 3 times daily (Patient not taking: Reported on 6/22/2018) 21 tablet 0     Allergies   Allergen Reactions     Vicodin [Acetaminophen] GI Disturbance     Stomach discomfort       Reviewed and updated as needed this visit by clinical staff  Tobacco  Allergies  Meds  Problems  Med Hx  Surg Hx  Fam Hx  Soc Hx        Reviewed and updated as needed this visit by Provider  Tobacco  Allergies  Meds  Problems  Med Hx  Surg Hx  Fam Hx       1. Cough and congestion: Ongoing for the past 3 weeks.  Got better 1 week ago but symptoms returned.  Cough gets worse at night.  No fevers or chills.  Possible sick contacts from grandchildren.  No sore throat.  States of wheezing and occasional SOB.  Tried OTC niquil and dayquil with minimal improvement.  Patient does not smoke.     2. Hypertension: Patient is currently on hyzaar.  He is overweight.  No chest pain or SOB.  He noticed that blood pressures 140s/90s at home.     ROS:  Review of Systems   Constitutional: Negative for activity change, appetite change, chills, fatigue and fever.   Respiratory: Positive for cough, shortness of breath and wheezing.    Cardiovascular: Negative for chest pain, palpitations and leg swelling.   Skin: Negative for color change, rash and wound.   Neurological: Negative for dizziness, weakness and headaches.   Psychiatric/Behavioral: Negative for agitation and decreased concentration. The patient is not nervous/anxious.        OBJECTIVE:     BP (!) 154/100 (BP Location: Left arm, Patient Position: Chair, Cuff Size: Adult Large)   Pulse 78   Temp 97.2  F (36.2  C) (Tympanic)   Resp 20   Wt 96.5 kg (212 lb 12.8 oz)   SpO2 97%   BMI 33.33 kg/m    Body mass index is 33.33  kg/m .  Physical Exam   Constitutional: He is oriented to person, place, and time. He appears well-developed and well-nourished. No distress.   HENT:   Head: Normocephalic and atraumatic.   Nose: Nose normal.   Eyes: Conjunctivae and EOM are normal.   Neck: Normal range of motion. No tracheal deviation present.   Cardiovascular: Normal rate, regular rhythm and normal heart sounds.   Pulmonary/Chest: Effort normal. He has no wheezes.   Decreased breath sounds at the bases   Musculoskeletal: Normal range of motion.   Neurological: He is alert and oriented to person, place, and time.   Skin: No rash noted. No erythema.   Psychiatric: He has a normal mood and affect. His behavior is normal.     ASSESSMENT/PLAN:     1. Upper respiratory tract infection, unspecified type  - azithromycin (ZITHROMAX) 250 MG tablet; Take 2 tablets (500 mg) by mouth daily for 1 day, THEN 1 tablet (250 mg) daily for 4 days.  Dispense: 6 tablet; Refill: 0  - guaiFENesin-codeine (GUAIFENESIN AC) 100-10 MG/5ML syrup; Take 5 mLs by mouth every 8 hours as needed for congestion or cough  Dispense: 118 mL; Refill: 0    2. Essential hypertension  Would like to increase hyzaar to 100/25 mg dosage.  Close f/u in 1 month.   - losartan-hydrochlorothiazide (HYZAAR) 100-25 MG tablet; Take 1 tablet by mouth daily  Dispense: 90 tablet; Refill: 3    See Patient Instructions    Papito Gould [facility]

## 2018-12-31 ENCOUNTER — OFFICE VISIT (OUTPATIENT)
Dept: FAMILY MEDICINE | Facility: CLINIC | Age: 53
End: 2018-12-31
Payer: COMMERCIAL

## 2018-12-31 VITALS
DIASTOLIC BLOOD PRESSURE: 100 MMHG | HEART RATE: 78 BPM | WEIGHT: 212.8 LBS | BODY MASS INDEX: 33.33 KG/M2 | SYSTOLIC BLOOD PRESSURE: 154 MMHG | OXYGEN SATURATION: 97 % | RESPIRATION RATE: 20 BRPM | TEMPERATURE: 97.2 F

## 2018-12-31 DIAGNOSIS — I10 ESSENTIAL HYPERTENSION: ICD-10-CM

## 2018-12-31 DIAGNOSIS — J06.9 UPPER RESPIRATORY TRACT INFECTION, UNSPECIFIED TYPE: Primary | ICD-10-CM

## 2018-12-31 PROCEDURE — 99214 OFFICE O/P EST MOD 30 MIN: CPT | Performed by: FAMILY MEDICINE

## 2018-12-31 RX ORDER — LOSARTAN POTASSIUM AND HYDROCHLOROTHIAZIDE 25; 100 MG/1; MG/1
1 TABLET ORAL DAILY
Qty: 90 TABLET | Refills: 3 | Status: SHIPPED | OUTPATIENT
Start: 2018-12-31 | End: 2019-05-09

## 2018-12-31 RX ORDER — CODEINE PHOSPHATE/GUAIFENESIN 10-100MG/5
5 LIQUID (ML) ORAL EVERY 8 HOURS PRN
Qty: 118 ML | Refills: 0 | Status: SHIPPED | OUTPATIENT
Start: 2018-12-31 | End: 2019-01-28

## 2018-12-31 RX ORDER — AZITHROMYCIN 250 MG/1
TABLET, FILM COATED ORAL
Qty: 6 TABLET | Refills: 0 | Status: SHIPPED | OUTPATIENT
Start: 2018-12-31 | End: 2019-03-11

## 2018-12-31 ASSESSMENT — PAIN SCALES - GENERAL: PAINLEVEL: NO PAIN (0)

## 2018-12-31 ASSESSMENT — ENCOUNTER SYMPTOMS
DIZZINESS: 0
SHORTNESS OF BREATH: 1
AGITATION: 0
HEADACHES: 0
COLOR CHANGE: 0
CHILLS: 0
WHEEZING: 1
WOUND: 0
COUGH: 1
PALPITATIONS: 0
APPETITE CHANGE: 0
FEVER: 0
FATIGUE: 0
NERVOUS/ANXIOUS: 0
DECREASED CONCENTRATION: 0
WEAKNESS: 0
ACTIVITY CHANGE: 0

## 2018-12-31 NOTE — NURSING NOTE
"Initial BP (!) 154/100 (BP Location: Left arm, Patient Position: Chair, Cuff Size: Adult Large)   Pulse 78   Temp 97.2  F (36.2  C) (Tympanic)   Resp 20   Wt 96.5 kg (212 lb 12.8 oz)   SpO2 97%   BMI 33.33 kg/m   Estimated body mass index is 33.33 kg/m  as calculated from the following:    Height as of 3/12/18: 1.702 m (5' 7\").    Weight as of this encounter: 96.5 kg (212 lb 12.8 oz). .    Alma Avalos CMA (AAMA)    "

## 2018-12-31 NOTE — PATIENT INSTRUCTIONS
Greetings Edis Burroughs,    Thank you for allowing [facility] to manage your care.    I sent your prescriptions to your pharmacy.    If you have any questions or concerns, please feel free to call us at [phone].    Sincerely,    Dr. Gould

## 2019-01-28 ENCOUNTER — OFFICE VISIT (OUTPATIENT)
Dept: FAMILY MEDICINE | Facility: CLINIC | Age: 54
End: 2019-01-28
Payer: COMMERCIAL

## 2019-01-28 VITALS
OXYGEN SATURATION: 97 % | BODY MASS INDEX: 32.8 KG/M2 | HEIGHT: 67 IN | SYSTOLIC BLOOD PRESSURE: 122 MMHG | DIASTOLIC BLOOD PRESSURE: 90 MMHG | TEMPERATURE: 97.8 F | HEART RATE: 91 BPM | WEIGHT: 209 LBS

## 2019-01-28 DIAGNOSIS — I10 HYPERTENSION, GOAL BELOW 140/90: Primary | ICD-10-CM

## 2019-01-28 PROCEDURE — 99213 OFFICE O/P EST LOW 20 MIN: CPT | Performed by: FAMILY MEDICINE

## 2019-01-28 ASSESSMENT — ENCOUNTER SYMPTOMS
EYE DISCHARGE: 0
WHEEZING: 0
NERVOUS/ANXIOUS: 0
COUGH: 0
SHORTNESS OF BREATH: 0
HEADACHES: 0
HALLUCINATIONS: 0
DIZZINESS: 0
PALPITATIONS: 0
SEIZURES: 0
APPETITE CHANGE: 0
ACTIVITY CHANGE: 0
EYE PAIN: 0
CONFUSION: 0
COLOR CHANGE: 0
FEVER: 0
DECREASED CONCENTRATION: 0
AGITATION: 0
FATIGUE: 0

## 2019-01-28 ASSESSMENT — MIFFLIN-ST. JEOR: SCORE: 1746.65

## 2019-01-28 NOTE — PROGRESS NOTES
SUBJECTIVE:   Edis Burroughs is a 53 year old male who presents to clinic today for the following health issues:      Hypertension Follow-up      Outpatient blood pressures are not being checked.    Low Salt Diet: low salt      Amount of exercise or physical activity: 6-7 days/week for an average of 1 mle    Problems taking medications regularly: No    Medication side effects: none    Diet: regular (no restrictions)    Patient Active Problem List   Diagnosis     Bipolar affective disorder, remission status unspecified (H)     CARDIOVASCULAR SCREENING; LDL GOAL LESS THAN 160     Hypertension, goal below 140/90     Past Surgical History:   Procedure Laterality Date     C APPENDECTOMY  1997     C LAMINECTOMY,FACETECTOMY,LUMBAR  2008       Social History     Tobacco Use     Smoking status: Never Smoker     Smokeless tobacco: Never Used   Substance Use Topics     Alcohol use: Yes     Alcohol/week: 0.0 oz     Comment: rarely     Family History   Problem Relation Age of Onset     Prostate Cancer Father      Heart Disease Father         open heart surgery     Cancer Father         lung     C.A.D. Father         triple bypass, first MI age 59     Cancer Sister         leukemia     Diabetes Maternal Grandmother      C.A.D. Maternal Grandmother      Diabetes Maternal Grandfather      C.A.D. Maternal Grandfather      Cerebrovascular Disease Paternal Grandfather      C.A.D. Paternal Grandfather      C.A.D. Paternal Grandmother      Hypertension No family hx of      Breast Cancer No family hx of      Cancer - colorectal No family hx of          Current Outpatient Medications   Medication Sig Dispense Refill     divalproex (DEPAKOTE) 500 MG 24 hr tablet Take 500 mg by mouth daily.       LamoTRIgine 300 MG TB24 Take  by mouth. One daily       losartan-hydrochlorothiazide (HYZAAR) 100-25 MG tablet Take 1 tablet by mouth daily 90 tablet 3     OLANZapine (ZYPREXA) 20 MG tablet        QUEtiapine (SEROQUEL) 300 MG tablet Take 600 mg  "by mouth At Bedtime TID qhs       ZOLOFT 100 MG OR TABS 2 TABLET DAILY 60 Tab 1     losartan-hydrochlorothiazide (HYZAAR) 100-12.5 MG per tablet TAKE ONE TABLET BY MOUTH EVERY DAY (Patient not taking: Reported on 1/28/2019) 90 tablet 0     valACYclovir (VALTREX) 1000 mg tablet Take 1 tablet (1,000 mg) by mouth 3 times daily (Patient not taking: Reported on 6/22/2018) 21 tablet 0     Allergies   Allergen Reactions     Vicodin [Acetaminophen] GI Disturbance     Stomach discomfort       Reviewed and updated as needed this visit by clinical staff  Tobacco  Allergies  Meds  Problems  Med Hx  Surg Hx  Fam Hx  Soc Hx        Reviewed and updated as needed this visit by Provider  Tobacco  Allergies  Meds  Problems  Med Hx  Surg Hx  Fam Hx       1. Hypertension f/u: Patient has been able to lose weight.  Patient is currently on hyzaar.  No chest pain or SOB.  Tolerating medications.  This has been a chronic condition which is now improved.  He does not smoke.  However, he is overweight.  He tries to walk his dog daily.       ROS:  Review of Systems   Constitutional: Negative for activity change, appetite change, fatigue and fever.   Eyes: Negative for pain, discharge and visual disturbance.   Respiratory: Negative for cough, shortness of breath and wheezing.    Cardiovascular: Negative for chest pain, palpitations and leg swelling.   Skin: Negative for color change and rash.   Neurological: Negative for dizziness, seizures and headaches.   Psychiatric/Behavioral: Negative for agitation, confusion, decreased concentration and hallucinations. The patient is not nervous/anxious.        OBJECTIVE:     /90 (BP Location: Left arm, Cuff Size: Adult Large)   Pulse 91   Temp 97.8  F (36.6  C) (Tympanic)   Ht 1.702 m (5' 7\")   Wt 94.8 kg (209 lb)   SpO2 97%   BMI 32.73 kg/m    Body mass index is 32.73 kg/m .  Physical Exam   Constitutional: He is oriented to person, place, and time. He appears well-developed " and well-nourished. No distress.   HENT:   Head: Normocephalic and atraumatic.   Nose: Nose normal.   Eyes: Conjunctivae and EOM are normal.   Neck: Normal range of motion. No tracheal deviation present.   Cardiovascular: Normal rate, regular rhythm and normal heart sounds.   Pulmonary/Chest: Effort normal. He has no wheezes.   Abdominal:   Overweight   Musculoskeletal: Normal range of motion.   Neurological: He is alert and oriented to person, place, and time.   Skin: No rash noted. No erythema.   Psychiatric: He has a normal mood and affect. His behavior is normal.     ASSESSMENT/PLAN:     1. Hypertension, goal below 140/90  Improved.  Continue with hyzaar.  Stressed the importance to diet and exercise.  Continue with weight loss.     See Patient Instructions    Papito Gould DO  [facility]

## 2019-01-28 NOTE — PATIENT INSTRUCTIONS
Greetings Edis Burroughs,    Thank you for allowing [facility] to manage your care.    Please continue with your current medications.    Great job with the weight loss.     If you have any questions or concerns, please feel free to call us at [phone].    Sincerely,    Dr. Gould

## 2019-02-07 DIAGNOSIS — F31.0 BIPOLAR I DISORDER, MOST RECENT EPISODE HYPOMANIC (H): Primary | ICD-10-CM

## 2019-02-07 PROCEDURE — 80164 ASSAY DIPROPYLACETIC ACD TOT: CPT

## 2019-02-07 PROCEDURE — 84450 TRANSFERASE (AST) (SGOT): CPT

## 2019-02-07 PROCEDURE — 36415 COLL VENOUS BLD VENIPUNCTURE: CPT

## 2019-02-07 PROCEDURE — 84460 ALANINE AMINO (ALT) (SGPT): CPT

## 2019-02-08 LAB
ALT SERPL W P-5'-P-CCNC: 49 U/L (ref 0–70)
AST SERPL W P-5'-P-CCNC: 22 U/L (ref 0–45)
VALPROATE SERPL-MCNC: 20 MG/L (ref 50–100)

## 2019-03-11 ENCOUNTER — OFFICE VISIT (OUTPATIENT)
Dept: FAMILY MEDICINE | Facility: CLINIC | Age: 54
End: 2019-03-11
Payer: COMMERCIAL

## 2019-03-11 VITALS
HEIGHT: 67 IN | OXYGEN SATURATION: 98 % | DIASTOLIC BLOOD PRESSURE: 76 MMHG | BODY MASS INDEX: 32.71 KG/M2 | TEMPERATURE: 97.8 F | WEIGHT: 208.38 LBS | SYSTOLIC BLOOD PRESSURE: 112 MMHG | RESPIRATION RATE: 20 BRPM | HEART RATE: 89 BPM

## 2019-03-11 DIAGNOSIS — J06.9 UPPER RESPIRATORY TRACT INFECTION, UNSPECIFIED TYPE: Primary | ICD-10-CM

## 2019-03-11 DIAGNOSIS — F31.9 BIPOLAR AFFECTIVE DISORDER, REMISSION STATUS UNSPECIFIED (H): ICD-10-CM

## 2019-03-11 DIAGNOSIS — I10 HYPERTENSION, GOAL BELOW 140/90: ICD-10-CM

## 2019-03-11 DIAGNOSIS — Z23 NEED FOR VACCINATION: ICD-10-CM

## 2019-03-11 LAB
ANION GAP SERPL CALCULATED.3IONS-SCNC: 5 MMOL/L (ref 3–14)
BUN SERPL-MCNC: 14 MG/DL (ref 7–30)
CALCIUM SERPL-MCNC: 8.5 MG/DL (ref 8.5–10.1)
CHLORIDE SERPL-SCNC: 106 MMOL/L (ref 94–109)
CO2 SERPL-SCNC: 30 MMOL/L (ref 20–32)
CREAT SERPL-MCNC: 0.96 MG/DL (ref 0.66–1.25)
GFR SERPL CREATININE-BSD FRML MDRD: 89 ML/MIN/{1.73_M2}
GLUCOSE SERPL-MCNC: 97 MG/DL (ref 70–99)
POTASSIUM SERPL-SCNC: 3.2 MMOL/L (ref 3.4–5.3)
SODIUM SERPL-SCNC: 141 MMOL/L (ref 133–144)

## 2019-03-11 PROCEDURE — 80048 BASIC METABOLIC PNL TOTAL CA: CPT | Performed by: FAMILY MEDICINE

## 2019-03-11 PROCEDURE — 90471 IMMUNIZATION ADMIN: CPT | Performed by: FAMILY MEDICINE

## 2019-03-11 PROCEDURE — 99214 OFFICE O/P EST MOD 30 MIN: CPT | Mod: 25 | Performed by: FAMILY MEDICINE

## 2019-03-11 PROCEDURE — 36415 COLL VENOUS BLD VENIPUNCTURE: CPT | Performed by: FAMILY MEDICINE

## 2019-03-11 PROCEDURE — 90715 TDAP VACCINE 7 YRS/> IM: CPT | Performed by: FAMILY MEDICINE

## 2019-03-11 RX ORDER — CODEINE PHOSPHATE AND GUAIFENESIN 10; 100 MG/5ML; MG/5ML
1-2 SOLUTION ORAL EVERY 4 HOURS PRN
Qty: 236 ML | Refills: 0 | Status: SHIPPED | OUTPATIENT
Start: 2019-03-11 | End: 2019-05-08

## 2019-03-11 ASSESSMENT — MIFFLIN-ST. JEOR: SCORE: 1743.81

## 2019-03-11 ASSESSMENT — PAIN SCALES - GENERAL: PAINLEVEL: NO PAIN (0)

## 2019-03-11 NOTE — PATIENT INSTRUCTIONS
*    Seems like just a cold, try a cough syrup with codeine, might help with sleep.     *    The could will fade in 2-3 weeks.     *   Will check blood tests today for kidney function and potassium levels.     *   Your blood pressure is excellent.     *    Follow up in 6 months for a blood pressure check.     *    Good luck with your mom.

## 2019-03-11 NOTE — PROGRESS NOTES
"  SUBJECTIVE:   Edis Burroughs is a 53 year old male who presents to clinic today for the following health issues:      Acute Illness   Acute illness concerns: Cough  Onset: 2 weeks    Fever: no    Chills/Sweats: no    Headache (location?): no    Sinus Pressure:no    Conjunctivitis:  no    Ear Pain: no    Rhinorrhea: no    Congestion: YES    Sore Throat: no     Cough: YES-non-productive    Wheeze: YES    Decreased Appetite: no    Nausea: no    Vomiting: no    Diarrhea:  no    Dysuria/Freq.: no    Fatigue/Achiness: no    Sick/Strep Exposure: no     Therapies Tried and outcome: Nyquil      Problem list and histories reviewed & adjusted, as indicated.  Additional history: as documented    Labs reviewed in EPIC    Reviewed and updated as needed this visit by clinical staff  Tobacco  Allergies  Meds  Med Hx  Surg Hx  Fam Hx  Soc Hx      Reviewed and updated as needed this visit by Provider         ROS:  CONSTITUTIONAL: NEGATIVE for fever, chills, change in weight  INTEGUMENTARY/SKIN: NEGATIVE for worrisome rashes, moles or lesions  ENT/MOUTH: POSITIVE for nasal congestion  RESP: POSITIVE for cough non productive and wheezing  CV: NEGATIVE for chest pain, palpitations or peripheral edema  PSYCHIATRIC: POSITIVE for disruptive sleep     This document serves as a record of the services and decisions personally performed and made by Ashwini Jessica MD. It was created on his behalf by Mihai High, a trained medical scribe. The creation of this document is based the provider's statements to the medical scribe.  Mihai High 3:50 PM March 11, 2019    OBJECTIVE:                                                    /76   Pulse 89   Temp 97.8  F (36.6  C) (Tympanic)   Resp 20   Ht 1.702 m (5' 7\")   Wt 94.5 kg (208 lb 6 oz)   SpO2 98%   BMI 32.64 kg/m    Body mass index is 32.64 kg/m .   GENERAL: alert and in mild distress  HENT: ear canals- normal; TMs- normal; Nose- normal; Mouth- no ulcers, no lesions  RESP: " lungs clear to auscultation - no rales, no rhonchi, no wheezes  CV: regular rates and rhythm, normal S1 S2  ABDOMEN: soft, no tenderness  PSYCH: mentation appears normal, affect normal/bright    Diagnostic test results:  Results for orders placed or performed in visit on 03/11/19   Basic metabolic panel   Result Value Ref Range    Sodium 141 133 - 144 mmol/L    Potassium 3.2 (L) 3.4 - 5.3 mmol/L    Chloride 106 94 - 109 mmol/L    Carbon Dioxide 30 20 - 32 mmol/L    Anion Gap 5 3 - 14 mmol/L    Glucose 97 70 - 99 mg/dL    Urea Nitrogen 14 7 - 30 mg/dL    Creatinine 0.96 0.66 - 1.25 mg/dL    GFR Estimate 89 >60 mL/min/[1.73_m2]    GFR Estimate If Black >90 >60 mL/min/[1.73_m2]    Calcium 8.5 8.5 - 10.1 mg/dL         ASSESSMENT/PLAN:                                                      (J06.9) Upper respiratory tract infection, unspecified type  (primary encounter diagnosis)  Comment: Consistent with a viral infection.  We will try to manage cough.  Plan: guaiFENesin-codeine (ROBITUSSIN AC) 100-10         MG/5ML solution        Reviewed side effects, especially sedation.    (I10) Hypertension, goal below 140/90  Comment: Within guidelines using 2 drug therapy.  Normal renal function, slightly low potassium.  Plan: Basic metabolic panel     Advised potassium rich diet.    (F31.9) Bipolar affective disorder, remission status unspecified (H)  Comment: Current symptoms tolerable.  Maintained on  Two atypical antipsychotics, mood stabilizer, and  Plan: *Continue on current medications.  Followed by psychiatry.    (Z23) Need for vaccination  Plan: VACCINE ADMINISTRATION, INITIAL, TDAP VACCINE         (ADACEL)      Patient Instructions   *    Seems like just a cold, try a cough syrup with codeine, might help with sleep.     *    The could will fade in 2-3 weeks.     *   Will check blood tests today for kidney function and potassium levels.     *   Your blood pressure is excellent.     *    Follow up in 6 months for a blood  pressure check.     *    Good luck with your mom.              The information in this document, created by a scribe for me, accurately reflects the services I personally performed and the decisions made by me. I have reviewed and approved this document for accuracy.     Ashwini Jessica MD  [facility]

## 2019-03-11 NOTE — NURSING NOTE
Screening Questionnaire for Adult Immunization    Are you sick today?   No   Do you have allergies to medications, food, a vaccine component or latex?   No   Have you ever had a serious reaction after receiving a vaccination?   No   Do you have a long-term health problem with heart disease, lung disease, asthma, kidney disease, metabolic disease (e.g. diabetes), anemia, or other blood disorder?   No   Do you have cancer, leukemia, HIV/AIDS, or any other immune system problem?   No   In the past 3 months, have you taken medications that affect  your immune system, such as prednisone, other steroids, or anticancer drugs; drugs for the treatment of rheumatoid arthritis, Crohn s disease, or psoriasis; or have you had radiation treatments?   No   Have you had a seizure, or a brain or other nervous system problem?   No   During the past year, have you received a transfusion of blood or blood     products, or been given immune (gamma) globulin or antiviral drug?   No   For women: Are you pregnant or is there a chance you could become        pregnant during the next month?   No   Have you received any vaccinations in the past 4 weeks?   No     Immunization questionnaire answers were all negative.          Screening performed by Altagracia Acuna on 3/11/2019 at 4:20 PM.

## 2019-05-08 ENCOUNTER — OFFICE VISIT (OUTPATIENT)
Dept: FAMILY MEDICINE | Facility: CLINIC | Age: 54
End: 2019-05-08
Payer: COMMERCIAL

## 2019-05-08 VITALS
BODY MASS INDEX: 29.73 KG/M2 | HEART RATE: 88 BPM | DIASTOLIC BLOOD PRESSURE: 86 MMHG | SYSTOLIC BLOOD PRESSURE: 138 MMHG | WEIGHT: 189.8 LBS | TEMPERATURE: 97.3 F | RESPIRATION RATE: 16 BRPM

## 2019-05-08 DIAGNOSIS — N41.9 PROSTATITIS, UNSPECIFIED PROSTATITIS TYPE: ICD-10-CM

## 2019-05-08 DIAGNOSIS — K64.8 INTERNAL HEMORRHOID: Primary | ICD-10-CM

## 2019-05-08 PROBLEM — Z13.6 CARDIOVASCULAR SCREENING; LDL GOAL LESS THAN 160: Status: RESOLVED | Noted: 2017-11-29 | Resolved: 2019-05-08

## 2019-05-08 LAB
BASOPHILS # BLD AUTO: 0 10E9/L (ref 0–0.2)
BASOPHILS NFR BLD AUTO: 1 %
CRP SERPL-MCNC: 6.2 MG/L (ref 0–8)
DIFFERENTIAL METHOD BLD: ABNORMAL
EOSINOPHIL # BLD AUTO: 0.1 10E9/L (ref 0–0.7)
EOSINOPHIL NFR BLD AUTO: 2.6 %
ERYTHROCYTE [DISTWIDTH] IN BLOOD BY AUTOMATED COUNT: 13.7 % (ref 10–15)
ERYTHROCYTE [SEDIMENTATION RATE] IN BLOOD BY WESTERGREN METHOD: 6 MM/H (ref 0–20)
HCT VFR BLD AUTO: 43.1 % (ref 40–53)
HGB BLD-MCNC: 14.8 G/DL (ref 13.3–17.7)
LYMPHOCYTES # BLD AUTO: 1.7 10E9/L (ref 0.8–5.3)
LYMPHOCYTES NFR BLD AUTO: 43.2 %
MCH RBC QN AUTO: 30 PG (ref 26.5–33)
MCHC RBC AUTO-ENTMCNC: 34.3 G/DL (ref 31.5–36.5)
MCV RBC AUTO: 87 FL (ref 78–100)
MONOCYTES # BLD AUTO: 0.3 10E9/L (ref 0–1.3)
MONOCYTES NFR BLD AUTO: 8.9 %
NEUTROPHILS # BLD AUTO: 1.7 10E9/L (ref 1.6–8.3)
NEUTROPHILS NFR BLD AUTO: 44.3 %
PLATELET # BLD AUTO: 238 10E9/L (ref 150–450)
PSA SERPL-ACNC: 3.82 UG/L (ref 0–4)
RBC # BLD AUTO: 4.94 10E12/L (ref 4.4–5.9)
WBC # BLD AUTO: 3.8 10E9/L (ref 4–11)

## 2019-05-08 PROCEDURE — 86140 C-REACTIVE PROTEIN: CPT | Performed by: NURSE PRACTITIONER

## 2019-05-08 PROCEDURE — 84153 ASSAY OF PSA TOTAL: CPT | Performed by: NURSE PRACTITIONER

## 2019-05-08 PROCEDURE — 85025 COMPLETE CBC W/AUTO DIFF WBC: CPT | Performed by: NURSE PRACTITIONER

## 2019-05-08 PROCEDURE — 36415 COLL VENOUS BLD VENIPUNCTURE: CPT | Performed by: NURSE PRACTITIONER

## 2019-05-08 PROCEDURE — 99213 OFFICE O/P EST LOW 20 MIN: CPT | Performed by: NURSE PRACTITIONER

## 2019-05-08 PROCEDURE — 85652 RBC SED RATE AUTOMATED: CPT | Performed by: NURSE PRACTITIONER

## 2019-05-08 RX ORDER — HYDROCODONE BITARTRATE AND ACETAMINOPHEN 5; 325 MG/1; MG/1
1 TABLET ORAL EVERY 6 HOURS PRN
Qty: 10 TABLET | Refills: 0 | Status: SHIPPED | OUTPATIENT
Start: 2019-05-08 | End: 2019-05-28

## 2019-05-08 RX ORDER — SULFAMETHOXAZOLE/TRIMETHOPRIM 800-160 MG
TABLET ORAL
Refills: 0 | COMMUNITY
Start: 2019-05-02 | End: 2019-05-08

## 2019-05-08 RX ORDER — HYDROCORTISONE ACETATE 25 MG/1
25 SUPPOSITORY RECTAL 2 TIMES DAILY
Qty: 14 SUPPOSITORY | Refills: 0 | Status: SHIPPED | OUTPATIENT
Start: 2019-05-08 | End: 2019-05-08

## 2019-05-08 RX ORDER — CIPROFLOXACIN 500 MG/1
500 TABLET, FILM COATED ORAL 2 TIMES DAILY
Qty: 14 TABLET | Refills: 0 | Status: SHIPPED | OUTPATIENT
Start: 2019-05-08 | End: 2019-05-28

## 2019-05-08 ASSESSMENT — ENCOUNTER SYMPTOMS
HEADACHES: 0
SORE THROAT: 0
VOMITING: 0
SINUS PRESSURE: 0
COUGH: 0
DIARRHEA: 0
DIFFICULTY URINATING: 1
NAUSEA: 1
WHEEZING: 0
FEVER: 0
DIAPHORESIS: 0
FATIGUE: 0
EYE DISCHARGE: 0
SHORTNESS OF BREATH: 0
RHINORRHEA: 0

## 2019-05-08 ASSESSMENT — PAIN SCALES - GENERAL: PAINLEVEL: WORST PAIN (10)

## 2019-05-08 NOTE — PROGRESS NOTES
hyd  SUBJECTIVE:   Edis Burroughs is a 53 year old male who presents to clinic today for the following   health issues:    Declines completing PHQ9 today. He is in too much pain.    ED/UC Followup:    Facility:  [facility]  Date of visit: 5/2/19  Reason for visit: rectal pain  Current Status: Still having unbearable pain. Sitting very difficult.          Additional history: as documented    Reviewed  and updated as needed this visit by clinical staff  Tobacco  Allergies  Meds  Med Hx  Surg Hx  Fam Hx  Soc Hx        Reviewed and updated as needed this visit by Provider         Current Outpatient Medications   Medication Sig Dispense Refill     ciprofloxacin (CIPRO) 500 MG tablet Take 1 tablet (500 mg) by mouth 2 times daily 14 tablet 0     divalproex (DEPAKOTE) 500 MG 24 hr tablet Take 500 mg by mouth daily.       HYDROcodone-acetaminophen (NORCO) 5-325 MG tablet Take 1 tablet by mouth every 6 hours as needed for severe pain 10 tablet 0     hydrocortisone (ANUSOL-HC) 2.5 % cream Place rectally 2 times daily as needed for hemorrhoids 30 g 2     LamoTRIgine 300 MG TB24 Take  by mouth. One daily       losartan-hydrochlorothiazide (HYZAAR) 100-25 MG tablet Take 1 tablet by mouth daily 90 tablet 3     QUEtiapine (SEROQUEL) 300 MG tablet Take 600 mg by mouth At Bedtime TID qhs       ZOLOFT 100 MG OR TABS 2 TABLET DAILY 60 Tab 1     Allergies   Allergen Reactions     Vicodin [Acetaminophen] GI Disturbance     Stomach discomfort       Here today for urgent care follow-up.  Was seen in Urgent Care 5/2/19.  Had been having pain for approximately 5 days prior to going to urgent care.  Had CT scan that was okay. Had 3 rectal exams that made it worse. Was told that prostate was swollen and was given prescription for bactrim, and has been taking. Pain is about the same. Will have times that will be more tolerable. Sitting makes the pain worse. Does have some nausea. No fevers or chills. Bowels have been  moving ok. Was given prescription for norco and is taking them mainly at night to control the pain. No blood in stools. Seems like urine stream getting started is getting better. No pain with urination. Was told that did have a small internal hemorrhoid. Does not have to push or strain to have a BM. No new sexual partners, no drainage from penis. Has been doing keto diet for the last 2 months.     ROS:  Review of Systems   Constitutional: Negative for diaphoresis, fatigue and fever.   HENT: Negative for congestion, ear pain, rhinorrhea, sinus pressure and sore throat.    Eyes: Negative for discharge.   Respiratory: Negative for cough, shortness of breath and wheezing.    Cardiovascular: Negative for chest pain.   Gastrointestinal: Positive for nausea. Negative for diarrhea and vomiting.        Internal rectal pain   Genitourinary: Positive for difficulty urinating (remains, but is some better).   Neurological: Negative for headaches.         OBJECTIVE:     /86 (BP Location: Left arm, Patient Position: Sitting, Cuff Size: Adult Large)   Pulse 88   Temp 97.3  F (36.3  C) (Tympanic)   Resp 16   Wt 86.1 kg (189 lb 12.8 oz)   BMI 29.73 kg/m    Body mass index is 29.73 kg/m .  Physical Exam   Constitutional: He appears well-developed and well-nourished.   HENT:   Head: Normocephalic and atraumatic.   Right Ear: Tympanic membrane and external ear normal. Tympanic membrane is not erythematous. No middle ear effusion.   Left Ear: Tympanic membrane and external ear normal. Tympanic membrane is not erythematous.  No middle ear effusion.   Nose: No mucosal edema or rhinorrhea.   Cardiovascular: Normal rate, regular rhythm and normal heart sounds.   Pulmonary/Chest: Effort normal and breath sounds normal. He has no wheezes.   Abdominal: Soft. Bowel sounds are normal. Hernia confirmed negative in the right inguinal area and confirmed negative in the left inguinal area.   Genitourinary: Testes normal and penis normal.        Right testis shows no swelling. Left testis shows no swelling.   Genitourinary Comments: Refused rectal exam due to prevously having 3 rectal exams in ER that caused significant pain.    Lymphadenopathy: Inguinal adenopathy noted on the left side.   Neurological: He is alert.   Skin: Skin is warm and dry.   Psychiatric: He has a normal mood and affect.         ASSESSMENT/PLAN:   1. Internal hemorrhoid  Encouraged not to push or strain to have bowel movement.  Push fluids.  Allow enough time for bowel movements daily  - hydrocortisone (ANUSOL-HC) 2.5 % cream; Place rectally 2 times daily as needed for hemorrhoids  Dispense: 30 g; Refill: 2    2. Prostatitis, unspecified prostatitis type  Plan to stop Bactrim.  Plan to start on Cipro.  Educated on use.  We will recheck labs here today.  Notify if no improvement by Monday and may need to refer to urology.  - ciprofloxacin (CIPRO) 500 MG tablet; Take 1 tablet (500 mg) by mouth 2 times daily  Dispense: 14 tablet; Refill: 0  - CRP inflammation  - Erythrocyte sedimentation rate auto  - Prostate spec antigen screen  - CBC with platelets differential  - HYDROcodone-acetaminophen (NORCO) 5-325 MG tablet; Take 1 tablet by mouth every 6 hours as needed for severe pain  Dispense: 10 tablet; Refill: 0        KEENAN Adorno [facility]

## 2019-05-08 NOTE — PATIENT INSTRUCTIONS
800 mg of ibuprofen, 4 over the counter tabs, with 650 mg of tylenol every 8 hours with food as needed for pain.    Stop Bactrim and start Cipro.    May use ice to area.    Notify if no improvement by Monday.    These are general instructions and may not be specific to you. Please call, email or follow up if you have any questions or concerns.   Patient Education     Prostatitis    The prostate gland is located deep inside the body at the base of the bladder. Prostatitis is an inflammation of the prostate gland. This can occur with or without infection. Most cases of prostatitis are long term (chronic). Most do not involve a bacterial infection.    Chronic prostatitis is more common in older men. It is usually an inflammatory condition and not an infection. But, bacterial infection can also cause chronic prostatitis. It can cause pain in the rectum, urethra, bladder, or scrotum. It can also make you unable to fully empty the bladder.  You may urinate often, or have burning with urination. Prostatitis may also cause painful ejaculation and erectile dysfunction.    Sudden onset (acute) prostatitis usually occurs in men younger than 35. It is from a bacterial infection. You may have severe symptoms such as fever, chills, muscle aches, and pain in the area between the scrotum and anus (perineum). You may have a hard time urinating, or have pain or burning when urinating. There may be blood or pus in the urine.  Your healthcare provider may do a culture test on prostate fluids or discharge from the penis. This will help determine if bacteria are the cause. Treatment can include antibiotics, anti-inflammatory medicine, prostate medicines, and stool softeners.  Home care  These guidelines will help you care for yourself at home:    Rest at home until the fever is gone and you are feeling better.    A hot sitz bath may offer some relief. Fill a tub with 6 inches of hot water. Allow the water to run so you can keep it hot  for 10 to 15 minutes.    Drink plenty of fluids. Do not drink alcohol or caffeine until all symptoms are gone.    If your healthcare gives you an antibiotic, take it exactly as you are told. Take it until it is all gone.    Constipation causes straining and pain. Avoid constipation by eating natural laxatives such as prunes, fresh fruits, and whole-grain cereals. If needed, use a mild over-the-counter (OTC) laxative for constipation. An OTC stool softener may be used to keep the stools soft.    If sex is uncomfortable or painful, avoid until symptoms get better.    You may use OTC medicines for pain and fever, unless another medicine was given. If you have chronic liver or kidney disease, talk with your healthcare provider before using these medicines. Also talk with your provider if you've ever had a stomach ulcer or GI bleeding.  Follow-up care  Follow up with your healthcare provider, a urologist, or as advised to be sure you are responding to treatment. Your healthcare provider may want to see you after you finish your antibiotics to be sure the infection has cleared. If a culture was taken, you may call for the results as directed. A culture test can help your healthcare provider know if you are on the correct antibiotic.  Call 911  Call 911 if any of these occur:    Weakness, dizziness, or fainting  When to seek medical advice  Call your healthcare provider right away if any of these occur:    Fever of 100.4 F (38 C) or higher, or as directed by your healthcare provider    Unable to pass urine for 8 hours    Pressure or pain in your bladder gets worse    Painful swelling of the testicle or scrotum  Date Last Reviewed: 10/1/2016    6608-6013 [employer]. [address] [place] [address], [place] [address]. All rights reserved. This information is not intended as a substitute for professional medical care. Always follow your healthcare professional's instructions.

## 2019-05-09 DIAGNOSIS — I10 HYPERTENSION, GOAL BELOW 140/90: Primary | ICD-10-CM

## 2019-05-09 RX ORDER — LOSARTAN POTASSIUM AND HYDROCHLOROTHIAZIDE 12.5; 1 MG/1; MG/1
1 TABLET ORAL DAILY
Qty: 90 TABLET | Refills: 3 | Status: SHIPPED | OUTPATIENT
Start: 2019-05-09 | End: 2020-06-09

## 2019-05-15 ENCOUNTER — TELEPHONE (OUTPATIENT)
Dept: FAMILY MEDICINE | Facility: CLINIC | Age: 54
End: 2019-05-15

## 2019-05-15 NOTE — TELEPHONE ENCOUNTER
Spoke with patient   Reviewed Jaskaran note form labs in March  Dose adjustment in HCTZ , decrease to 12.5mg  Due to low potassium  , pt did review on mychart in March  Discussed ok with change , encouraged to eat potassium enrich food,   May call as need  [facility]  RN/Terrell Cobb

## 2019-05-15 NOTE — TELEPHONE ENCOUNTER
"Pt called pharmacy unsure of what \"prescription was ready.\" [facility] stated dose changed with losartan/hctz based on recent lab results. Pt was unaware of abnormal lab values/ resultant treatment plan.     Please call pt if appropriate    Genet Bustillos [facility]  [phone]  "

## 2019-05-16 ENCOUNTER — TELEPHONE (OUTPATIENT)
Dept: FAMILY MEDICINE | Facility: CLINIC | Age: 54
End: 2019-05-16

## 2019-05-16 DIAGNOSIS — N41.9 PROSTATITIS, UNSPECIFIED PROSTATITIS TYPE: Primary | ICD-10-CM

## 2019-05-16 DIAGNOSIS — Z12.11 SPECIAL SCREENING FOR MALIGNANT NEOPLASMS, COLON: ICD-10-CM

## 2019-05-16 NOTE — TELEPHONE ENCOUNTER
Pt has appointment scheduled with Leonora Mckeon tomorrow (5/17/19) to follow up on prostate issues. If all he is needing is the referral to urology Leonora will place the referral without another appointment with her.   Princess Hill, [place]

## 2019-05-16 NOTE — TELEPHONE ENCOUNTER
Urology referral placed.  Can call [facility]: [facility] [phone]    [facility]: [facility] [phone]   to set up appointment date and time that works best for him.  Also, placed order for colonoscopy.    Leonora CONNORC

## 2019-05-16 NOTE — TELEPHONE ENCOUNTER
Spoke with pt and yes he is only looking for a  Referral to urology and dr moore told him to also get a colonoscopy done as well so he needs a order for that as well.     Alva Meeks, [place]

## 2019-05-17 ENCOUNTER — HOSPITAL ENCOUNTER (OUTPATIENT)
Facility: CLINIC | Age: 54
End: 2019-05-17
Attending: SURGERY | Admitting: SURGERY
Payer: COMMERCIAL

## 2019-05-20 ENCOUNTER — TELEPHONE (OUTPATIENT)
Dept: FAMILY MEDICINE | Facility: CLINIC | Age: 54
End: 2019-05-20

## 2019-05-20 NOTE — TELEPHONE ENCOUNTER
"Spoke with patient regarding symptoms.  Patient reports that for about the last 3 weeks his rectal pain from hemorrhoids has been worsening: \"the pain gets so bad that I get nauseous.\" The pain is constant. States only has some mild bleeding intermittently.  Appointment advised. Scheduled with Dr. Jessica 5/28. Patient will  the hydrocortisone script from the pharmacy.    Mandi Villagomez RN    "

## 2019-05-20 NOTE — TELEPHONE ENCOUNTER
Pt is calling and states that he is needing to talk to a nurse about his colon and  hemorrhoids , please call to triage.     Alva Meeks, [place]

## 2019-05-28 ENCOUNTER — OFFICE VISIT (OUTPATIENT)
Dept: FAMILY MEDICINE | Facility: CLINIC | Age: 54
End: 2019-05-28
Payer: COMMERCIAL

## 2019-05-28 VITALS
DIASTOLIC BLOOD PRESSURE: 70 MMHG | HEIGHT: 67 IN | WEIGHT: 180.5 LBS | RESPIRATION RATE: 14 BRPM | HEART RATE: 68 BPM | BODY MASS INDEX: 28.33 KG/M2 | SYSTOLIC BLOOD PRESSURE: 110 MMHG | TEMPERATURE: 97.8 F

## 2019-05-28 DIAGNOSIS — K62.89 RECTAL PAIN: Primary | ICD-10-CM

## 2019-05-28 DIAGNOSIS — F31.9 BIPOLAR AFFECTIVE DISORDER, REMISSION STATUS UNSPECIFIED (H): ICD-10-CM

## 2019-05-28 DIAGNOSIS — I10 HYPERTENSION, GOAL BELOW 140/90: ICD-10-CM

## 2019-05-28 PROCEDURE — 99214 OFFICE O/P EST MOD 30 MIN: CPT | Performed by: FAMILY MEDICINE

## 2019-05-28 ASSESSMENT — MIFFLIN-ST. JEOR: SCORE: 1617.37

## 2019-05-28 ASSESSMENT — PAIN SCALES - GENERAL: PAINLEVEL: SEVERE PAIN (6)

## 2019-05-28 NOTE — PROGRESS NOTES
"Subjective     Edis Burroughs is a 53 year old male who presents to clinic today for the following health issues:    HPI   Hemorrhoids  Onset: 1-2 months    Description:   Pain: YES  Itching: no     Accompanying Signs & Symptoms:  Blood streaked toilet paper: YES  Blood in stool: no   Changes in stool pattern: no     History:   Any previous GI studies done: None but he does have colonoscopy scheduled for 6/6/2019  Family History of colon cancer: YES- maternal uncle and gandfather    Precipitating factors:   None    Alleviating factors:  None    Therapies Tried and outcome: Anusol cream, Tylenol and ibuprofen          Hypertension Follow-up      Do you check your blood pressure regularly outside of the clinic? Yes     Are you following a low salt diet? Yes    Are your blood pressures ever more than 140 on the top number (systolic) OR more   than 90 on the bottom number (diastolic), for example 140/90? No      Also address history of bipolar disorder. Patient states he's doing well.       Reviewed and updated as needed this visit by Provider         Review of Systems   ROS COMP: Constitutional, HEENT, cardiovascular, pulmonary, gi and gu systems are negative, except as otherwise noted.      Objective    /70   Pulse 68   Temp 97.8  F (36.6  C) (Tympanic)   Resp 14   Ht 1.702 m (5' 7\")   Wt 81.9 kg (180 lb 8 oz)   BMI 28.27 kg/m    Body mass index is 28.27 kg/m .  Physical Exam   GENERAL: Healthy, alert and no distress  RESP: Lungs clear to auscultation - no rales, rhonchi or wheezes  CV: Regular rate and rhythm, normal S1 S2, no murmur  GI:  soft, nontender, no HSM   Rectal: No external abnormality noted, no hemorrhoid, obvious fissure.  The sphincter was mildly tender to palpation.  No fistula noted.  MS: No gross musculoskeletal defects noted, no edema  NEURO: Normal strength and tone, mentation intact and speech normal  PSYCH: Mentation appears normal, affect normal/bright         Assessment & Plan " "    (K62.89) Rectal pain  (primary encounter diagnosis)  Comment: Possible fistula, could not exclude abscess although the patient appears well and is not immunocompromised.  Advised that he see general surgery prior to his colonoscopy for evaluation.  Plan: GENERAL SURG ADULT REFERRAL        Patient is call with any concerns or questions.    (I10) Hypertension, goal below 140/90  Comment: Excellent readings with weight loss and increased activity.  Plan: For now, continue on current blood pressure medication.  If he maintains these excellent readings, will discontinue one of his blood pressure medications.    (F31.9) Bipolar affective disorder, remission status unspecified (H)  Comment: Appears to be doing well, mood improved with increased activity.  Plan: Continue current medications and follow-up per psychiatry.      BMI:   Estimated body mass index is 28.27 kg/m  as calculated from the following:    Height as of this encounter: 1.702 m (5' 7\").    Weight as of this encounter: 81.9 kg (180 lb 8 oz).   Weight management plan: Discussed healthy diet and exercise guidelines    Patient Instructions   *   I would recommend seeing Dr. Ang before the colonoscopy. Call [phone]. In case something needs to be done at the time of the colonoscopy.     *   This seems like a fissure.     *   It's great that you've lost weight.     *    For maintaining weight, less carbs, more protein, less processed food, less finger food. Combine with some type of exercise program.          Return in about 3 months (around 8/28/2019) for Routine Visit.    Ashwini Jessica MD  [facility]        "

## 2019-05-28 NOTE — PATIENT INSTRUCTIONS
*   I would recommend seeing Dr. Ang before the colonoscopy. Call [phone]. In case something needs to be done at the time of the colonoscopy.     *   This seems like a fissure.     *   It's great that you've lost weight.     *    For maintaining weight, less carbs, more protein, less processed food, less finger food. Combine with some type of exercise program.

## 2019-05-31 ENCOUNTER — TELEPHONE (OUTPATIENT)
Dept: FAMILY MEDICINE | Facility: CLINIC | Age: 54
End: 2019-05-31

## 2019-05-31 DIAGNOSIS — K62.89 RECTAL PAIN: Primary | ICD-10-CM

## 2019-05-31 RX ORDER — OXYCODONE AND ACETAMINOPHEN 5; 325 MG/1; MG/1
1 TABLET ORAL EVERY 6 HOURS PRN
Qty: 12 TABLET | Refills: 0 | Status: SHIPPED | OUTPATIENT
Start: 2019-05-31 | End: 2019-07-22

## 2019-05-31 NOTE — TELEPHONE ENCOUNTER
Short prescription printed for Percocet.  May cause constipation which in turn could actually increase rectal pain.  Please make sure drinking plenty of fluids and eating high-fiber foods.    Leonora CONNORC

## 2019-05-31 NOTE — TELEPHONE ENCOUNTER
Script walked to the [facility].    Aracelis Jacome, [facility]

## 2019-05-31 NOTE — TELEPHONE ENCOUNTER
Patient called and ask it he could get something for the rectal pains, he's having he says it unbearable.  Send to the [facility] pharmacy.    Aracelis Jacome, [place]

## 2019-06-02 ENCOUNTER — ANESTHESIA EVENT (OUTPATIENT)
Dept: OTOLARYNGOLOGY | Facility: CLINIC | Age: 54
End: 2019-06-02

## 2019-06-02 RX ORDER — LIDOCAINE 40 MG/G
CREAM TOPICAL
Status: CANCELLED | OUTPATIENT
Start: 2019-06-02

## 2019-06-02 RX ORDER — SODIUM CHLORIDE, SODIUM LACTATE, POTASSIUM CHLORIDE, CALCIUM CHLORIDE 600; 310; 30; 20 MG/100ML; MG/100ML; MG/100ML; MG/100ML
INJECTION, SOLUTION INTRAVENOUS CONTINUOUS
Status: CANCELLED | OUTPATIENT
Start: 2019-06-02

## 2019-06-03 NOTE — ANESTHESIA PREPROCEDURE EVALUATION
Anesthesia Pre-Procedure Evaluation    Patient: Edis Burroughs   MRN: [id number] : 1965          Preoperative Diagnosis: screening    Procedure(s):  COLONOSCOPY    No past medical history on file.  Past Surgical History:   Procedure Laterality Date     C APPENDECTOMY       C LAMINECTOMY,FACETECTOMY,LUMBAR  2008       Anesthesia Evaluation     .             ROS/MED HX    ENT/Pulmonary:       Neurologic:       Cardiovascular:     (+) hypertension----. : . . . :. . Previous cardiac testing Echodate:[id number]:Interpretation Summary  The EKG portion of this stress test was negative for inducible ischemia (see  echo results below).  This was a normal stress echocardiogram with no evidence of stress-induced  ischemia.Stress Testdate:10-20-08 results:INDICATION:  Edis Burroughs was here for assessment of myocardial   perfusion with history of chest discomfort.  Indication for exercise   stress testing was chest discomfort.      IMPRESSIONS:   I  Exercise Test   1. The patient exercised to an adequate cardiac workload with an   average functional aerobic capacity demonstrated.   2. The patient experienced no chest discomfort during the test.   3. ECG report done under separate cover.      II  Myocardial Perfusion Scintigraphy   1. Myocardial perfusion using single isotope technique demonstrated   no evidence of scar or ischemia.   2. Gating demonstrated normal biventricular size and function.   3. The ejection fraction was 52% at rest and 52% post stress.   4. N/A.      RESTING TOMOGRAPHY:  Following the injection of 11.8 mCi of   technetium-99m tetrofosmin, gamma camera imaging was performed using   180 degree SPECT technique.      EXERCISE TEST RESULTS:  The patient exercised for 11 minutes and 27   seconds according to the Rad protocol.  The peak heart rate   achieved was 169 which is 95% of the age-predicted maximum heart   rate, 13.2 METS.  Blood pressure at peak exercise was 190/80 giving a    pressure rate product of 32,110.  The test was terminated secondary   to target heart rate and work load achieved.      ECG report done under separate cover.      Ninety seconds prior to the termination of exercise, the patient was   injected with 33.5 mCi of technetium-99m tetrofosmin.  Gamma camera   imaging was performed later using 180 degree SPECT technique.      TOMOGRAPHIC RESULTS:  The study is of excellent quality.  On the   stress images, perfusion is normal.  On the rest images, no   significant change is seen.  Gated images demonstrated normal   biventricular size and function.  End diastolic and end systolic   volumes post stress are 71 and 34 cc respectively.  The ejection   fraction was 52% at rest and post stress. ECG reviewed date:2-15-17 results:Sinus Rhythm   WITHIN NORMAL LIMITS   date: results:          METS/Exercise Tolerance:     Hematologic:         Musculoskeletal:         GI/Hepatic:         Renal/Genitourinary:         Endo:         Psychiatric: Comment: Bipolar affective disorder, remission status unspecified     (+) psychiatric history other (comment) and depression      Infectious Disease:         Malignancy:         Other:                                 Lab Results   Component Value Date    WBC 3.8 (L) 05/08/2019    HGB 14.8 05/08/2019    HCT 43.1 05/08/2019     05/08/2019    CRP 6.2 05/08/2019    SED 6 05/08/2019     03/11/2019    POTASSIUM 3.2 (L) 03/11/2019    CHLORIDE 106 03/11/2019    CO2 30 03/11/2019    BUN 14 03/11/2019    CR 0.96 03/11/2019    GLC 97 03/11/2019    LEIDY 8.5 03/11/2019    ALT 49 02/07/2019    AST 22 02/07/2019    TSH 1.22 02/15/2017    T4 1.00 08/30/2010    T3 98 08/30/2010       Preop Vitals  BP Readings from Last 3 Encounters:   05/28/19 110/70   05/08/19 138/86   03/11/19 112/76    Pulse Readings from Last 3 Encounters:   05/28/19 68   05/08/19 88   03/11/19 89      Resp Readings from Last 3 Encounters:   05/28/19 14   05/08/19 16   03/11/19 20  "   SpO2 Readings from Last 3 Encounters:   03/11/19 98%   01/28/19 97%   12/31/18 97%      Temp Readings from Last 1 Encounters:   05/28/19 36.6  C (97.8  F) (Tympanic)    Ht Readings from Last 1 Encounters:   05/28/19 1.702 m (5' 7\")      Wt Readings from Last 1 Encounters:   05/28/19 81.9 kg (180 lb 8 oz)    Estimated body mass index is 28.27 kg/m  as calculated from the following:    Height as of 5/28/19: 1.702 m (5' 7\").    Weight as of 5/28/19: 81.9 kg (180 lb 8 oz).                   Rui Hathaway, CRNA, APRN CRNA  "

## 2019-06-04 ENCOUNTER — OFFICE VISIT (OUTPATIENT)
Dept: UROLOGY | Facility: CLINIC | Age: 54
End: 2019-06-04
Payer: COMMERCIAL

## 2019-06-04 VITALS — SYSTOLIC BLOOD PRESSURE: 108 MMHG | DIASTOLIC BLOOD PRESSURE: 72 MMHG | HEART RATE: 98 BPM | OXYGEN SATURATION: 97 %

## 2019-06-04 DIAGNOSIS — R31.9 HEMATURIA: ICD-10-CM

## 2019-06-04 DIAGNOSIS — N40.1 BENIGN PROSTATIC HYPERPLASIA WITH LOWER URINARY TRACT SYMPTOMS, SYMPTOM DETAILS UNSPECIFIED: Primary | ICD-10-CM

## 2019-06-04 LAB
ALBUMIN UR-MCNC: NEGATIVE MG/DL
APPEARANCE UR: CLEAR
BILIRUB UR QL STRIP: NEGATIVE
COLOR UR AUTO: YELLOW
GLUCOSE UR STRIP-MCNC: NEGATIVE MG/DL
HGB UR QL STRIP: ABNORMAL
KETONES UR STRIP-MCNC: ABNORMAL MG/DL
LEUKOCYTE ESTERASE UR QL STRIP: NEGATIVE
NITRATE UR QL: NEGATIVE
PH UR STRIP: 5 PH (ref 5–7)
RBC #/AREA URNS AUTO: ABNORMAL /HPF
SOURCE: ABNORMAL
SP GR UR STRIP: 1.02 (ref 1–1.03)
UROBILINOGEN UR STRIP-ACNC: 0.2 EU/DL (ref 0.2–1)
WBC #/AREA URNS AUTO: ABNORMAL /HPF

## 2019-06-04 PROCEDURE — 99203 OFFICE O/P NEW LOW 30 MIN: CPT | Mod: 25 | Performed by: UROLOGY

## 2019-06-04 PROCEDURE — 51798 US URINE CAPACITY MEASURE: CPT | Performed by: UROLOGY

## 2019-06-04 PROCEDURE — 81001 URINALYSIS AUTO W/SCOPE: CPT | Performed by: UROLOGY

## 2019-06-04 RX ORDER — TAMSULOSIN HYDROCHLORIDE 0.4 MG/1
0.4 CAPSULE ORAL AT BEDTIME
Qty: 30 CAPSULE | Refills: 1 | Status: SHIPPED | OUTPATIENT
Start: 2019-06-04 | End: 2019-07-09

## 2019-06-04 NOTE — PATIENT INSTRUCTIONS
Your next cystoscopy is scheduled 7/9/2019  @ 1:15pm. Please call  if you need to reschedule this appointment.    Patient Education     Cystoscopy    Cystoscopy is a procedure that lets your doctor look directly inside your urethra and bladder. It can be used to:    Help diagnose a problem with your urethra, bladder, or kidneys.    Take a sample (biopsy) of bladder or urethral tissue.    Treat certain problems (such as removing kidney stones).    Place a stent to bypass an obstruction.    Take special X-rays of the kidneys.  Based on the findings, your doctor may recommend other tests or treatments.  What is a cystoscope?  A cystoscope is a telescope-like instrument that contains lenses and fiberoptics (small glass wires that make bright light). The cystoscope may be straight and rigid, or flexible to bend around curves in the urethra. The doctor may look directly into the cystoscope, or project the image onto a monitor.  Getting ready    Ask your doctor if you should stop taking any medicines before the procedure.    Ask whether you should avoid eating or drinking anything after midnight before the procedure.    Follow any other instructions your doctor gives you.  Tell your doctor before the exam if you:    Take any medicines, such as aspirin or blood thinners    Have allergies to any medicines    Are pregnant   The procedure  Cystoscopy is done in the doctor s office, surgery center, or hospital. The doctor and a nurse are present during the procedure. It takes only a few minutes, longer if a biopsy, X-ray, or treatment needs to be done.  During the procedure:    You lie on an exam table on your back, knees bent and legs apart. You are covered with a drape.    Your urethra and the area around it are washed. Anesthetic jelly may be applied to numb the urethra. Other pain medicine is usually not needed. In some cases, you may be offered a mild sedative to help you relax. If a more extensive procedure is  to be done, such as a biopsy or kidney stone removal, general anesthesia may be needed.    The cystoscope is inserted. A sterile fluid is put into the bladder to expand it. You may feel pressure from this fluid.    When the procedure is done, the cystoscope is removed.  After the procedure  If you had a sedative, general anesthesia, or spinal anesthesia, you must have someone drive you home. Once you re home:    Drink plenty of fluids.    You may have burning or light bleeding when you urinate--this is normal.    Medicines may be prescribed to ease any discomfort or prevent infection. Take these as directed.    Call your doctor if you have heavy bleeding or blood clots, burning that lasts more than a day, a fever over 100 F  (38  C), or trouble urinating.  Date Last Reviewed: 1/1/2017 2000-2018 [employer]. [address] [place] [address]. All rights reserved. This information is not intended as a substitute for professional medical care. Always follow your healthcare professional's instructions.

## 2019-06-04 NOTE — PROGRESS NOTES
S: Edis Burroughs is a pleasant  53 year old male who was requested to be seen by  Ashwini Jessica for a consult with regard to patient's urinary complaints.  Patient complains of Hesitancy in starting urinary stream, Sensation of incomplete emptying, Strain to Urinate, Nocturia x 3, frequency, urgency and slow urinary stream.  He has no history of elevated PSA.  Symptoms have been on going for   several years(s).  Seems to be worsened over time.  His recent PSA was found to be   PSA   Date Value Ref Range Status   05/08/2019 3.82 0 - 4 ug/L Final     Comment:     Assay Method:  Chemiluminescence using Siemens Vista analyzer   .  His AUA Symptom Score:  33.  His QOL score:  4.  He has no hematuria.  Recent UA was normal.    Current Outpatient Medications   Medication Sig Dispense Refill     divalproex (DEPAKOTE) 500 MG 24 hr tablet Take 500 mg by mouth daily.       LamoTRIgine 300 MG TB24 Take  by mouth. One daily       losartan-hydrochlorothiazide (HYZAAR) 100-12.5 MG tablet Take 1 tablet by mouth daily 90 tablet 3     QUEtiapine (SEROQUEL) 300 MG tablet Take 600 mg by mouth At Bedtime TID qhs       tamsulosin (FLOMAX) 0.4 MG capsule Take 1 capsule (0.4 mg) by mouth At Bedtime 30 capsule 1     ZOLOFT 100 MG OR TABS 2 TABLET DAILY 60 Tab 1     hydrocortisone (ANUSOL-HC) 2.5 % cream Place rectally 2 times daily as needed for hemorrhoids 30 g 2     Allergies   Allergen Reactions     Vicodin [Acetaminophen] GI Disturbance     Stomach discomfort     No past medical history on file.  Past Surgical History:   Procedure Laterality Date     C APPENDECTOMY  1997     C LAMINECTOMY,FACETECTOMY,LUMBAR  2008      Family History   Problem Relation Age of Onset     Prostate Cancer Father      Heart Disease Father         open heart surgery     Cancer Father         lung     C.A.D. Father         triple bypass, first MI age 59     Cancer Sister         leukemia     Diabetes Maternal Grandmother      FARRUKHAWOODROW. Maternal Grandmother       Diabetes Maternal Grandfather      C.A.D. Maternal Grandfather      Colon Cancer Maternal Grandfather      Cerebrovascular Disease Paternal Grandfather      C.A.D. Paternal Grandfather      C.A.D. Paternal Grandmother      Colon Cancer Maternal Uncle      Hypertension No family hx of      Breast Cancer No family hx of      Cancer - colorectal No family hx of      He does not have a family history of prostate cancer.  Social History     Socioeconomic History     Marital status:      Spouse name: None     Number of children: None     Years of education: None     Highest education level: None   Occupational History     None   Social Needs     Financial resource strain: None     Food insecurity:     Worry: None     Inability: None     Transportation needs:     Medical: None     Non-medical: None   Tobacco Use     Smoking status: Never Smoker     Smokeless tobacco: Never Used   Substance and Sexual Activity     Alcohol use: Yes     Alcohol/week: 0.0 oz     Comment: rarely     Drug use: No     Sexual activity: Yes     Partners: Female   Lifestyle     Physical activity:     Days per week: None     Minutes per session: None     Stress: None   Relationships     Social connections:     Talks on phone: None     Gets together: None     Attends Jainism service: None     Active member of club or organization: None     Attends meetings of clubs or organizations: None     Relationship status: None     Intimate partner violence:     Fear of current or ex partner: None     Emotionally abused: None     Physically abused: None     Forced sexual activity: None   Other Topics Concern     Parent/sibling w/ CABG, MI or angioplasty before 65F 55M? No   Social History Narrative     None        REVIEW OF SYSTEMS  =================  C: NEGATIVE for fever, chills, change in weight  I: NEGATIVE for worrisome rashes, moles or lesions  E/M: NEGATIVE for ear, mouth and throat problems  R: NEGATIVE for significant cough or SHORTNESS OF  BREATH  CV:  NEGATIVE for chest pain, palpitations or peripheral edema  GI: NEGATIVE for nausea, abdominal pain, heartburn, or change in bowel habits  NEURO: NEGATIVE numbness/weakness  : see HPI  PSYCH: NEGATIVE depression/anxiety  LYmph: no new enlarged lymph nodes  Ortho: no new trauma/movements           O: Exam:/72 (BP Location: Right arm, Patient Position: Chair, Cuff Size: Adult Large)   Pulse 98   SpO2 97%    Constitutional: healthy, alert and no distress  Cardiovascular: negative, PMI normal.   Respiratory: negative, no evidence of respiratory distress  Gastrointestinal: Abdomen soft, non-tender. BS normal. No masses, organomegaly  : penis no discharge. Testis no masses.  No scrotal skin lesion.  Prostate 20 gm smooth no nodule.  Musculoskeletal: extremities normal- no gross deformities noted, gait normal and normal muscle tone  Skin: no suspicious lesions or rashes  Neurologic: Alert and oriented  Psychiatric: mentation appears normal. and affect normal/bright  Hematologic/Lymphatic/Immunologic: normal ant/post cervical, axillary, supraclavicular and inguinal nodes    Assessment/Plan:   (N40.1) Benign prostatic hyperplasia with lower urinary tract symptoms, symptom details unspecified  (primary encounter diagnosis)  Comment: bladder scan zero ml.  Plan: trial of flomax           Side effects discussed           RTC for uroflow/cysto

## 2019-06-05 ENCOUNTER — OFFICE VISIT (OUTPATIENT)
Dept: SURGERY | Facility: CLINIC | Age: 54
End: 2019-06-05
Payer: COMMERCIAL

## 2019-06-05 VITALS
HEART RATE: 84 BPM | TEMPERATURE: 97 F | SYSTOLIC BLOOD PRESSURE: 181 MMHG | BODY MASS INDEX: 28.25 KG/M2 | WEIGHT: 180 LBS | DIASTOLIC BLOOD PRESSURE: 81 MMHG | HEIGHT: 67 IN

## 2019-06-05 DIAGNOSIS — K62.5 RECTAL BLEEDING: Primary | ICD-10-CM

## 2019-06-05 DIAGNOSIS — K62.89 RECTAL PAIN: ICD-10-CM

## 2019-06-05 PROCEDURE — 99202 OFFICE O/P NEW SF 15 MIN: CPT | Performed by: SURGERY

## 2019-06-05 RX ORDER — OXYCODONE AND ACETAMINOPHEN 5; 325 MG/1; MG/1
1 TABLET ORAL EVERY 6 HOURS PRN
Qty: 20 TABLET | Refills: 0 | Status: SHIPPED | OUTPATIENT
Start: 2019-06-05 | End: 2019-07-22

## 2019-06-05 ASSESSMENT — MIFFLIN-ST. JEOR: SCORE: 1615.1

## 2019-06-05 NOTE — NURSING NOTE
"Initial /81 (BP Location: Right arm, Patient Position: Sitting, Cuff Size: Adult Regular)   Pulse 84   Temp 97  F (36.1  C) (Tympanic)   Ht 1.702 m (5' 7\")   Wt 81.6 kg (180 lb)   BMI 28.19 kg/m   Estimated body mass index is 28.19 kg/m  as calculated from the following:    Height as of this encounter: 1.702 m (5' 7\").    Weight as of this encounter: 81.6 kg (180 lb). .    Lizette Wagner MA    "

## 2019-06-05 NOTE — PROGRESS NOTES
53-year-old male complained of deep rectal pain for the past 6 weeks.  Patient also has intermittent rectal bleeding which resolved spontaneously.  Pain is deep pelvic and slightly external.  2 out of 10 without radiation.  No aggravating or relieving factors.  Pain is constant but worse with bowel movements.    Patient Active Problem List   Diagnosis     Bipolar affective disorder, remission status unspecified (H)     Hypertension, goal below 140/90       History reviewed. No pertinent past medical history.    Past Surgical History:   Procedure Laterality Date     C APPENDECTOMY  1997     C LAMINECTOMY,FACETECTOMY,LUMBAR  2008       Family History   Problem Relation Age of Onset     Prostate Cancer Father      Heart Disease Father         open heart surgery     Cancer Father         lung     C.A.D. Father         triple bypass, first MI age 59     Cancer Sister         leukemia     Diabetes Maternal Grandmother      C.A.D. Maternal Grandmother      Diabetes Maternal Grandfather      C.A.D. Maternal Grandfather      Colon Cancer Maternal Grandfather      Cerebrovascular Disease Paternal Grandfather      C.A.D. Paternal Grandfather      C.A.D. Paternal Grandmother      Colon Cancer Maternal Uncle      Hypertension No family hx of      Breast Cancer No family hx of      Cancer - colorectal No family hx of        Social History     Tobacco Use     Smoking status: Never Smoker     Smokeless tobacco: Never Used   Substance Use Topics     Alcohol use: Yes     Alcohol/week: 0.0 oz     Comment: rarely        History   Drug Use No       Current Outpatient Medications   Medication Sig Dispense Refill     oxyCODONE-acetaminophen (PERCOCET) 5-325 MG tablet Take 1 tablet by mouth every 6 hours as needed for pain 20 tablet 0     divalproex (DEPAKOTE) 500 MG 24 hr tablet Take 500 mg by mouth daily.       hydrocortisone (ANUSOL-HC) 2.5 % cream Place rectally 2 times daily as needed for hemorrhoids 30 g 2     LamoTRIgine 300 MG TB24  Take  by mouth. One daily       losartan-hydrochlorothiazide (HYZAAR) 100-12.5 MG tablet Take 1 tablet by mouth daily 90 tablet 3     QUEtiapine (SEROQUEL) 300 MG tablet Take 600 mg by mouth At Bedtime TID qhs       tamsulosin (FLOMAX) 0.4 MG capsule Take 1 capsule (0.4 mg) by mouth At Bedtime 30 capsule 1     ZOLOFT 100 MG OR TABS 2 TABLET DAILY 60 Tab 1       Allergies   Allergen Reactions     Vicodin [Acetaminophen] GI Disturbance     Stomach discomfort      ROS  Constitutional - Denies fevers, weight loss, malaise, lethargy  Neuro - Denies tremors or seizures  Pulmon - Denies SOB, dyspnea, hemoptysis, chronic cough or use of an inhaler  CV - Denies CP, SOB, lower extremity edema, difficulty w/ stairs, has never used NTG  GI - Denies hematemesis, BRBPR, melena, chronic diarrhea or epigastric pain   - Denies hematuria, difficulty voiding, h/o STDs  Hematology - Denies blood clotting disorders, chronic anemias  Dermatology - No melanomas or skin cancers  Rheumatology - No h/o RA  Pysch - Denies depression, bipolar d/o or schizophrenia    Exam:  AXO3 NAD  Rectal-normal external anatomy, no bleeding, too tender for internal exam  Extr - No edema    Assessment and plan: 53-year-old male with deep rectal pain especially following bowel movements.  This could be anal sphincter spasms.  Bleeding is likely from internal hemorrhoids.  Cannot rule out masses or polyps and so we will go ahead and schedule a colonoscopy.  Prescription written for pain medication while we continue to work this up.    Han Ang MD

## 2019-06-05 NOTE — LETTER
6/5/2019         RE: Edis Burroughs  [address]  [facility] [id number]        Dear Colleague,    Thank you for referring your patient, Edis Burroughs, to the [facility]. Please see a copy of my visit note below.    53-year-old male complained of deep rectal pain for the past 6 weeks.  Patient also has intermittent rectal bleeding which resolved spontaneously.  Pain is deep pelvic and slightly external.  2 out of 10 without radiation.  No aggravating or relieving factors.  Pain is constant but worse with bowel movements.    Patient Active Problem List   Diagnosis     Bipolar affective disorder, remission status unspecified (H)     Hypertension, goal below 140/90       History reviewed. No pertinent past medical history.    Past Surgical History:   Procedure Laterality Date     C APPENDECTOMY  1997     C LAMINECTOMY,FACETECTOMY,LUMBAR  2008       Family History   Problem Relation Age of Onset     Prostate Cancer Father      Heart Disease Father         open heart surgery     Cancer Father         lung     C.A.D. Father         triple bypass, first MI age 59     Cancer Sister         leukemia     Diabetes Maternal Grandmother      C.A.D. Maternal Grandmother      Diabetes Maternal Grandfather      C.A.D. Maternal Grandfather      Colon Cancer Maternal Grandfather      Cerebrovascular Disease Paternal Grandfather      C.A.D. Paternal Grandfather      C.A.D. Paternal Grandmother      Colon Cancer Maternal Uncle      Hypertension No family hx of      Breast Cancer No family hx of      Cancer - colorectal No family hx of        Social History     Tobacco Use     Smoking status: Never Smoker     Smokeless tobacco: Never Used   Substance Use Topics     Alcohol use: Yes     Alcohol/week: 0.0 oz     Comment: rarely        History   Drug Use No       Current Outpatient Medications   Medication Sig Dispense Refill     oxyCODONE-acetaminophen (PERCOCET) 5-325 MG tablet Take 1 tablet by mouth every 6  hours as needed for pain 20 tablet 0     divalproex (DEPAKOTE) 500 MG 24 hr tablet Take 500 mg by mouth daily.       hydrocortisone (ANUSOL-HC) 2.5 % cream Place rectally 2 times daily as needed for hemorrhoids 30 g 2     LamoTRIgine 300 MG TB24 Take  by mouth. One daily       losartan-hydrochlorothiazide (HYZAAR) 100-12.5 MG tablet Take 1 tablet by mouth daily 90 tablet 3     QUEtiapine (SEROQUEL) 300 MG tablet Take 600 mg by mouth At Bedtime TID qhs       tamsulosin (FLOMAX) 0.4 MG capsule Take 1 capsule (0.4 mg) by mouth At Bedtime 30 capsule 1     ZOLOFT 100 MG OR TABS 2 TABLET DAILY 60 Tab 1       Allergies   Allergen Reactions     Vicodin [Acetaminophen] GI Disturbance     Stomach discomfort      ROS  Constitutional - Denies fevers, weight loss, malaise, lethargy  Neuro - Denies tremors or seizures  Pulmon - Denies SOB, dyspnea, hemoptysis, chronic cough or use of an inhaler  CV - Denies CP, SOB, lower extremity edema, difficulty w/ stairs, has never used NTG  GI - Denies hematemesis, BRBPR, melena, chronic diarrhea or epigastric pain   - Denies hematuria, difficulty voiding, h/o STDs  Hematology - Denies blood clotting disorders, chronic anemias  Dermatology - No melanomas or skin cancers  Rheumatology - No h/o RA  Pysch - Denies depression, bipolar d/o or schizophrenia    Exam:  AXO3 NAD  Rectal-normal external anatomy, no bleeding, too tender for internal exam  Extr - No edema    Assessment and plan: 53-year-old male with deep rectal pain especially following bowel movements.  This could be anal sphincter spasms.  Bleeding is likely from internal hemorrhoids.  Cannot rule out masses or polyps and so we will go ahead and schedule a colonoscopy.  Prescription written for pain medication while we continue to work this up.    Han Ang MD     Again, thank you for allowing me to participate in the care of your patient.        Sincerely,        Han Ang MD

## 2019-06-06 ENCOUNTER — ANESTHESIA (OUTPATIENT)
Dept: OTOLARYNGOLOGY | Facility: CLINIC | Age: 54
End: 2019-06-06

## 2019-06-06 ENCOUNTER — TELEPHONE (OUTPATIENT)
Dept: UROLOGY | Facility: CLINIC | Age: 54
End: 2019-06-06

## 2019-06-24 ENCOUNTER — ANESTHESIA EVENT (OUTPATIENT)
Dept: GASTROENTEROLOGY | Facility: CLINIC | Age: 54
End: 2019-06-24
Payer: COMMERCIAL

## 2019-06-24 NOTE — ANESTHESIA PREPROCEDURE EVALUATION
Anesthesia Pre-Procedure Evaluation    Patient: Edis Burroughs   MRN: [id number] : 1965          Preoperative Diagnosis: rectal pain  diagnostic    Procedure(s):  COLONOSCOPY    No past medical history on file.  Past Surgical History:   Procedure Laterality Date     C APPENDECTOMY       C LAMINECTOMY,FACETECTOMY,LUMBAR  2008       Anesthesia Evaluation     . Pt has had prior anesthetic.     No history of anesthetic complications          ROS/MED HX    ENT/Pulmonary:  - neg pulmonary ROS     Neurologic:  - neg neurologic ROS     Cardiovascular:     (+) hypertension----. : . . . :. .       METS/Exercise Tolerance:  >4 METS   Hematologic:  - neg hematologic  ROS       Musculoskeletal:  - neg musculoskeletal ROS       GI/Hepatic:     (+) Other GI/Hepatic rectal pain      Renal/Genitourinary:  - ROS Renal section negative       Endo:  - neg endo ROS       Psychiatric:     (+) psychiatric history bipolar      Infectious Disease:  - neg infectious disease ROS       Malignancy:      - no malignancy   Other:    - neg other ROS                      Physical Exam  Normal systems: cardiovascular, pulmonary and dental    Airway   Mallampati: II  TM distance: >3 FB  Neck ROM: full    Dental     Cardiovascular       Pulmonary             Lab Results   Component Value Date    WBC 3.8 (L) 2019    HGB 14.8 2019    HCT 43.1 2019     2019    CRP 6.2 2019    SED 6 2019     2019    POTASSIUM 3.2 (L) 2019    CHLORIDE 106 2019    CO2 30 2019    BUN 14 2019    CR 0.96 2019    GLC 97 2019    LEIDY 8.5 2019    ALT 49 2019    AST 22 2019    TSH 1.22 02/15/2017    T4 1.00 2010    T3 98 2010       Preop Vitals  BP Readings from Last 3 Encounters:   19 181/81   19 108/72   19 110/70    Pulse Readings from Last 3 Encounters:   19 84   19 98   19 68      Resp Readings from Last 3  "Encounters:   05/28/19 14   05/08/19 16   03/11/19 20    SpO2 Readings from Last 3 Encounters:   06/04/19 97%   03/11/19 98%   01/28/19 97%      Temp Readings from Last 1 Encounters:   06/05/19 36.1  C (97  F) (Tympanic)    Ht Readings from Last 1 Encounters:   06/05/19 1.702 m (5' 7\")      Wt Readings from Last 1 Encounters:   06/05/19 81.6 kg (180 lb)    Estimated body mass index is 28.19 kg/m  as calculated from the following:    Height as of 6/5/19: 1.702 m (5' 7\").    Weight as of 6/5/19: 81.6 kg (180 lb).       Anesthesia Plan      History & Physical Review  History and physical reviewed and following examination; no interval change.    ASA Status:  2 .    NPO Status:  > 6 hours    Plan for General and MAC with Propofol and Intravenous induction. Maintenance will be TIVA.  Reason for MAC:  Deep or markedly invasive procedure (G8)  PONV prophylaxis:  Ondansetron (or other 5HT-3) and Dexamethasone or Solumedrol       Postoperative Care  Postoperative pain management:  IV analgesics and Oral pain medications.      Consents  Anesthetic plan, risks, benefits and alternatives discussed with:  Patient..                 KEENAN Shi CRNA  "

## 2019-06-25 ENCOUNTER — ANESTHESIA (OUTPATIENT)
Dept: GASTROENTEROLOGY | Facility: CLINIC | Age: 54
End: 2019-06-25
Payer: COMMERCIAL

## 2019-06-25 ENCOUNTER — HOSPITAL ENCOUNTER (OUTPATIENT)
Facility: CLINIC | Age: 54
Discharge: HOME | End: 2019-06-25
Attending: SURGERY | Admitting: SURGERY
Payer: COMMERCIAL

## 2019-06-25 VITALS
DIASTOLIC BLOOD PRESSURE: 91 MMHG | BODY MASS INDEX: 27.47 KG/M2 | RESPIRATION RATE: 16 BRPM | WEIGHT: 175 LBS | TEMPERATURE: 98.6 F | HEART RATE: 72 BPM | OXYGEN SATURATION: 98 % | SYSTOLIC BLOOD PRESSURE: 129 MMHG | HEIGHT: 67 IN

## 2019-06-25 DIAGNOSIS — K60.2 ANAL FISSURE: Primary | ICD-10-CM

## 2019-06-25 LAB — COLONOSCOPY: NORMAL

## 2019-06-25 PROCEDURE — 37000009 ZZH ANESTHESIA TECHNICAL FEE, EACH ADDTL 15 MIN: Performed by: SURGERY

## 2019-06-25 PROCEDURE — 37000008 ZZH ANESTHESIA TECHNICAL FEE, 1ST 30 MIN: Performed by: SURGERY

## 2019-06-25 PROCEDURE — 25800030 ZZH RX IP 258 OP 636: Performed by: NURSE ANESTHETIST, CERTIFIED REGISTERED

## 2019-06-25 PROCEDURE — 25000128 H RX IP 250 OP 636: Performed by: NURSE ANESTHETIST, CERTIFIED REGISTERED

## 2019-06-25 PROCEDURE — 45385 COLONOSCOPY W/LESION REMOVAL: CPT | Performed by: SURGERY

## 2019-06-25 PROCEDURE — 88305 TISSUE EXAM BY PATHOLOGIST: CPT | Mod: 26 | Performed by: SURGERY

## 2019-06-25 PROCEDURE — 25000125 ZZHC RX 250: Performed by: NURSE ANESTHETIST, CERTIFIED REGISTERED

## 2019-06-25 PROCEDURE — 88305 TISSUE EXAM BY PATHOLOGIST: CPT | Performed by: SURGERY

## 2019-06-25 RX ORDER — LIDOCAINE 40 MG/G
CREAM TOPICAL
Status: DISCONTINUED | OUTPATIENT
Start: 2019-06-25 | End: 2019-06-25 | Stop reason: HOSPADM

## 2019-06-25 RX ORDER — SODIUM CHLORIDE, SODIUM LACTATE, POTASSIUM CHLORIDE, CALCIUM CHLORIDE 600; 310; 30; 20 MG/100ML; MG/100ML; MG/100ML; MG/100ML
INJECTION, SOLUTION INTRAVENOUS CONTINUOUS
Status: DISCONTINUED | OUTPATIENT
Start: 2019-06-25 | End: 2019-06-25 | Stop reason: HOSPADM

## 2019-06-25 RX ORDER — PROPOFOL 10 MG/ML
INJECTION, EMULSION INTRAVENOUS CONTINUOUS PRN
Status: DISCONTINUED | OUTPATIENT
Start: 2019-06-25 | End: 2019-06-25

## 2019-06-25 RX ORDER — PROPOFOL 10 MG/ML
INJECTION, EMULSION INTRAVENOUS PRN
Status: DISCONTINUED | OUTPATIENT
Start: 2019-06-25 | End: 2019-06-25

## 2019-06-25 RX ORDER — LIDOCAINE HYDROCHLORIDE 10 MG/ML
INJECTION, SOLUTION INFILTRATION; PERINEURAL PRN
Status: DISCONTINUED | OUTPATIENT
Start: 2019-06-25 | End: 2019-06-25

## 2019-06-25 RX ORDER — ONDANSETRON 2 MG/ML
4 INJECTION INTRAMUSCULAR; INTRAVENOUS
Status: DISCONTINUED | OUTPATIENT
Start: 2019-06-25 | End: 2019-06-25 | Stop reason: HOSPADM

## 2019-06-25 RX ADMIN — LIDOCAINE HYDROCHLORIDE 50 MG: 10 INJECTION, SOLUTION INFILTRATION; PERINEURAL at 12:58

## 2019-06-25 RX ADMIN — PROPOFOL 100 MG: 10 INJECTION, EMULSION INTRAVENOUS at 12:58

## 2019-06-25 RX ADMIN — PROPOFOL 50 MG: 10 INJECTION, EMULSION INTRAVENOUS at 13:04

## 2019-06-25 RX ADMIN — LIDOCAINE HYDROCHLORIDE 1 ML: 10 INJECTION, SOLUTION EPIDURAL; INFILTRATION; INTRACAUDAL; PERINEURAL at 12:00

## 2019-06-25 RX ADMIN — PROPOFOL 100 MG: 10 INJECTION, EMULSION INTRAVENOUS at 13:02

## 2019-06-25 RX ADMIN — PROPOFOL 200 MCG/KG/MIN: 10 INJECTION, EMULSION INTRAVENOUS at 12:58

## 2019-06-25 RX ADMIN — SODIUM CHLORIDE, POTASSIUM CHLORIDE, SODIUM LACTATE AND CALCIUM CHLORIDE: 600; 310; 30; 20 INJECTION, SOLUTION INTRAVENOUS at 11:59

## 2019-06-25 ASSESSMENT — MIFFLIN-ST. JEOR: SCORE: 1592.42

## 2019-06-25 NOTE — BRIEF OP NOTE
[facility]   Brief Operative Note    Pre-operative diagnosis: rectal pain  diagnostic   Post-operative diagnosis anal filssure, single polyp     Procedure: Procedure(s):  COLONOSCOPY, FLEXIBLE, WITH LESION REMOVAL USING SNARE   Surgeon(s): Surgeon(s) and Role:     * Jay Monterroso MD - Primary   Estimated blood loss: * No values recorded between 6/25/2019 12:00 AM and 6/25/2019  1:42 PM *    Specimens: ID Type Source Tests Collected by Time Destination   A : 80 cm Polyp Colon SURGICAL PATHOLOGY EXAM Jay Monterroso MD 6/25/2019  1:28 PM       Findings: 1. Anal fissure palpated, sentinel tags  2. 4 mm polyp at 80 cm  3. Colon otherwise normal

## 2019-06-25 NOTE — ANESTHESIA CARE TRANSFER NOTE
Patient: Edis Burroughs    Procedure(s):  COLONOSCOPY, FLEXIBLE, WITH LESION REMOVAL USING SNARE    Diagnosis: rectal pain  diagnostic  Diagnosis Additional Information: No value filed.    Anesthesia Type:   General, MAC     Note:  Airway :Nasal Cannula  Patient transferred to:Phase II  Handoff Report: Identifed the Patient, Identified the Reponsible Provider, Reviewed the pertinent medical history, Discussed the surgical course, Reviewed Intra-OP anesthesia mangement and issues during anesthesia, Set expectations for post-procedure period and Allowed opportunity for questions and acknowledgement of understanding      Vitals: (Last set prior to Anesthesia Care Transfer)    CRNA VITALS  6/25/2019 1312 - 6/25/2019 1343      6/25/2019             Pulse:  77    SpO2:  99 %                Electronically Signed By: KEENAN Canada CRNA  June 25, 2019  1:43 PM

## 2019-06-25 NOTE — H&P
53 year old year old male here for colonoscopy for screening.    Patient Active Problem List   Diagnosis     Bipolar affective disorder, remission status unspecified (H)     Hypertension, goal below 140/90       No past medical history on file.    Past Surgical History:   Procedure Laterality Date     C APPENDECTOMY  1997     C LAMINECTOMY,FACETECTOMY,LUMBAR  2008       [facility]    No current outpatient medications on file.       Allergies   Allergen Reactions     Vicodin [Acetaminophen] GI Disturbance     Stomach discomfort       Pt reports that he has never smoked. He has never used smokeless tobacco. He reports that he drinks alcohol. He reports that he does not use drugs.    Exam:  There were no vitals taken for this visit.    Awake, Alert OX3  Lungs - CTA bilaterally  CV - RRR, no murmurs, distal pulses intact  Abd - soft, non-distended, non-tender, +BS  Extr - No cyanosis or edema    A/P 53 year old year old male in need of colonoscopy for screening. Risks, benefits, alternatives, and complications were discussed including the possibility of perforation and the patient agreed to proceed    Jay Monterroso MD

## 2019-06-25 NOTE — ANESTHESIA POSTPROCEDURE EVALUATION
Patient: Edis Burroughs    Procedure(s):  COLONOSCOPY, FLEXIBLE, WITH LESION REMOVAL USING SNARE    Diagnosis:rectal pain  diagnostic  Diagnosis Additional Information: No value filed.    Anesthesia Type:  General, MAC    Note:  Anesthesia Post Evaluation    Patient location during evaluation: Bedside  Patient participation: Able to fully participate in evaluation  Level of consciousness: sleepy but conscious  Pain management: adequate  Airway patency: patent  Cardiovascular status: stable  Respiratory status: nasal cannula  Hydration status: stable  PONV: none     Anesthetic complications: None          Last vitals:  Vitals:    06/25/19 1345 06/25/19 1400 06/25/19 1415   BP: 132/90 (!) 145/98 (!) 129/91   Pulse: 75 74 72   Resp: 16 16    Temp:      SpO2: 97% 99% 98%         Electronically Signed By: KEENAN Canada CRNA  June 25, 2019  2:47 PM

## 2019-06-27 LAB — COPATH REPORT: NORMAL

## 2019-06-28 PROBLEM — D12.6 TUBULAR ADENOMA OF COLON: Status: ACTIVE | Noted: 2019-06-28

## 2019-07-09 ENCOUNTER — OFFICE VISIT (OUTPATIENT)
Dept: UROLOGY | Facility: CLINIC | Age: 54
End: 2019-07-09
Payer: COMMERCIAL

## 2019-07-09 DIAGNOSIS — N40.1 BENIGN PROSTATIC HYPERPLASIA WITH LOWER URINARY TRACT SYMPTOMS, SYMPTOM DETAILS UNSPECIFIED: Primary | ICD-10-CM

## 2019-07-09 PROCEDURE — 52000 CYSTOURETHROSCOPY: CPT | Performed by: UROLOGY

## 2019-07-09 RX ORDER — TAMSULOSIN HYDROCHLORIDE 0.4 MG/1
0.4 CAPSULE ORAL AT BEDTIME
Qty: 90 CAPSULE | Refills: 3 | Status: ON HOLD | OUTPATIENT
Start: 2019-07-09 | End: 2020-02-11

## 2019-07-09 RX ORDER — FINASTERIDE 5 MG/1
5 TABLET, FILM COATED ORAL DAILY
Qty: 90 TABLET | Refills: 3 | Status: SHIPPED | OUTPATIENT
Start: 2019-07-09 | End: 2020-03-09

## 2019-07-09 NOTE — PROGRESS NOTES
S: Edis Burroughs is a 53 year old male returns for weak stream.    Patient is draped and prepped.  Flexible cystoscopy placed under direct vision.      The anterior urethra is normal   The prostatic urethra showed bilateral lobe enlargement.     The length is 3cm,  the coaptation is 3 cm.     In the bladder there is trabeculation grade 1.    Assessment/Plan:  (N40.1) Benign prostatic hyperplasia with lower urinary tract symptoms, symptom details unspecified  (primary encounter diagnosis)  Comment:    Plan: cont with flomax           Add proscar - side effects discussed           See in six months with psa           Discussed if symptoms not better.

## 2019-07-16 ENCOUNTER — TELEPHONE (OUTPATIENT)
Dept: FAMILY MEDICINE | Facility: CLINIC | Age: 54
End: 2019-07-16

## 2019-07-16 NOTE — TELEPHONE ENCOUNTER
Recommend follow-up with Dr. Monterroso next week as he can likely do Botox injections to get this to heal.    Han Ang MD

## 2019-07-16 NOTE — TELEPHONE ENCOUNTER
Called pt left message to return call.  Edis needs to schedule a consult visit with Dr. Monterroso and would like to offer him an appt on Monday July 22 at 9:15am for consult.     Pascale Ortiz  [facility] RN

## 2019-07-16 NOTE — TELEPHONE ENCOUNTER
Has also seen Dr Ang.    Will inquire if pt need another appt or just try a different salve?  Annia Mercado RN  [facility] [place]

## 2019-07-16 NOTE — TELEPHONE ENCOUNTER
Pt returned call and appt scheduled for Monday July 22nd at 9:30am.    Pascale Ortiz  [facility] RN

## 2019-07-16 NOTE — TELEPHONE ENCOUNTER
Reason for Call:  Other     Detailed comments: PT is calling for a concerns with tear in rectum.. He got a prescription on 6/25 when he had his colonoscopy  and did get a prescription of nifedipine 0.2% in white petrolatum 0.2 % OINT ointment and still has pain, discomfort and this med is not working.  Please advise    Phone Number Patient can be reached at: Home number on file [phone] (home)    Best Time: any    Can we leave a detailed message on this number? YES    Call taken on 7/16/2019 at 11:18 AM by Grace Del Valle

## 2019-07-16 NOTE — TELEPHONE ENCOUNTER
Routed to Specialty RN Pool as this was ordered by Dr. Monterroso for anal fissure found during colonoscopy done 6/25/19.    Lynn MOTTA RN

## 2019-07-22 ENCOUNTER — OFFICE VISIT (OUTPATIENT)
Dept: SURGERY | Facility: CLINIC | Age: 54
End: 2019-07-22
Payer: COMMERCIAL

## 2019-07-22 VITALS
HEART RATE: 90 BPM | WEIGHT: 175 LBS | SYSTOLIC BLOOD PRESSURE: 135 MMHG | BODY MASS INDEX: 27.47 KG/M2 | DIASTOLIC BLOOD PRESSURE: 80 MMHG | TEMPERATURE: 98 F | HEIGHT: 67 IN

## 2019-07-22 DIAGNOSIS — K60.2 ANAL FISSURE: Primary | ICD-10-CM

## 2019-07-22 PROCEDURE — 99214 OFFICE O/P EST MOD 30 MIN: CPT | Performed by: SURGERY

## 2019-07-22 ASSESSMENT — MIFFLIN-ST. JEOR: SCORE: 1592.42

## 2019-07-22 NOTE — PROGRESS NOTES
PCP:  Ashwini Jessica    Chief complaint: No fissure    History of Present Illness: Patient is a healthy 53-year-old man who presents with a previously diagnosed anal fissure.  This was diagnosed at the time of colonoscopy.  Colonoscopy was unremarkable except for a small polyp which was benign.    He comes today after having used nifedipine gel for a month or so claiming that the pain is not improved in any way and is interested in trying Botox.    Histories:  History reviewed. No pertinent past medical history.    Past Surgical History:   Procedure Laterality Date     C APPENDECTOMY  1997     C LAMINECTOMY,FACETECTOMY,LUMBAR  2008       Family History   Problem Relation Age of Onset     Prostate Cancer Father      Heart Disease Father         open heart surgery     Cancer Father         lung     C.A.D. Father         triple bypass, first MI age 59     Cancer Sister         leukemia     Diabetes Maternal Grandmother      C.A.D. Maternal Grandmother      Diabetes Maternal Grandfather      C.A.D. Maternal Grandfather      Colon Cancer Maternal Grandfather      Cerebrovascular Disease Paternal Grandfather      C.A.D. Paternal Grandfather      C.A.D. Paternal Grandmother      Colon Cancer Maternal Uncle      Hypertension No family hx of      Breast Cancer No family hx of      Cancer - colorectal No family hx of        Social History     Tobacco Use     Smoking status: Never Smoker     Smokeless tobacco: Never Used   Substance Use Topics     Alcohol use: Yes     Alcohol/week: 0.0 oz     Comment: rarely       Current Outpatient Medications   Medication Sig Dispense Refill     divalproex (DEPAKOTE) 500 MG 24 hr tablet Take 500 mg by mouth daily.       finasteride (PROSCAR) 5 MG tablet Take 1 tablet (5 mg) by mouth daily 90 tablet 3     hydrocortisone (ANUSOL-HC) 2.5 % cream Place rectally 2 times daily as needed for hemorrhoids 30 g 2     LamoTRIgine 300 MG TB24 Take  by mouth. One daily        losartan-hydrochlorothiazide (HYZAAR) 100-12.5 MG tablet Take 1 tablet by mouth daily 90 tablet 3     nifedipine 0.2% in white petrolatum 0.2 % OINT ointment Apply topically 2 times daily 30 g 3     QUEtiapine (SEROQUEL) 300 MG tablet Take 600 mg by mouth At Bedtime TID qhs       tamsulosin (FLOMAX) 0.4 MG capsule Take 1 capsule (0.4 mg) by mouth At Bedtime 90 capsule 3     ZOLOFT 100 MG OR TABS 2 TABLET DAILY 60 Tab 1       Allergies   Allergen Reactions     Vicodin [Acetaminophen] GI Disturbance     Stomach discomfort       Images:  No results found for this or any previous visit (from the past 744 hour(s)).    Labs:  Results for orders placed or performed during the hospital encounter of 06/25/19   COLONOSCOPY   Result Value Ref Range    COLONOSCOPY       _______________________________________________________________________________  Patient Name: Edis Burroughs             Procedure Date: 6/25/2019 12:44 PM  MRN: [id number]                       YOB: 1965  Admit Type: Outpatient                Age: 53  Gender: Male                          Attending MD: Jay Monterroso MD  Total Sedation Time:                    _______________________________________________________________________________     Procedure:           Colonoscopy  Indications:         Hematochezia  Providers:           Jay Monterroso MD, Nona Oden RN  Referring MD:        Han Ang MD  Medicines:           Monitored Anesthesia Care  Complications:       No immediate complications.  _______________________________________________________________________________  Procedure:           Pre-Anesthesia Assessment:                       - Prior to the procedure, a History and Physical was                        performed, and patient medications, allergies  and                        sensitivities were reviewed. The patient's tolerance of                        previous anesthesia was reviewed.                       - The risks and  benefits of the procedure and the                        sedation options and risks were discussed with the                        patient. All questions were answered and informed                        consent was obtained.                       - Patient identification and proposed procedure were                        verified prior to the procedure by the physician, the                        nurse, the anesthetist and the technician. The procedure                        was verified in the pre-procedure area in the procedure                        room in the endoscopy suite.                       After obtaining informed consent, the colonoscope was                        passed under direct vision. Throughout the procedure,                        the patient's blood pressure, pulse, and oxygen                         saturations were monitored continuously. The Colonoscope                        was introduced through the anus and advanced to the                        cecum, identified by appendiceal orifice and ileocecal                        valve. The colonoscopy was performed without difficulty.                        The patient tolerated the procedure well. The quality of                        the bowel preparation was good.                                                                                   Findings:       Skin tags were found on perianal exam.       An anal fissure was found on perianal exam.       A 4 mm polyp was found at 80 cm proximal to the anus. The polyp was        pedunculated. The polyp was removed with a hot snare. Resection and        retrieval were complete. Verification of patient identification for the        specimen was done using the patient's name, birth date and medical        record number. Estimated blood loss: none.       The exam was oth erwise normal throughout the examined colon.       The retroflexed view of the distal rectum and anal verge was normal and     "    showed no anal or rectal abnormalities.                                                                                   Impression:          - Perianal skin tags found on perianal exam.                       - Anal fissure found on perianal exam.                       - One 4 mm polyp at 80 cm proximal to the anus, removed                        with a hot snare. Resected and retrieved.  Recommendation:      - Discharge patient to home (ambulatory).                       - High fiber diet indefinitely.                       - Await pathology results.                       - Repeat colonoscopy in 5 years for surveillance based                        on pathology results.                                                                                     Signed electronically by Jay Monterroso MD  _______________  Jay Monterroso MD  6/25/2019 1:51:18 PM  Number  of Addenda: 0    Note Initiated On: 6/25/2019 12:44 PM  Scope In: 1:05:25 PM  Scope Out: 1:36:28 PM     Surgical pathology exam   Result Value Ref Range    Copath Report       Patient Name: CAMILLE BRIGGS  MR#: [id number]  Specimen #: [id number]  Collected: 6/25/2019  Received: 6/26/2019  Reported: 6/27/2019 15:32  Ordering Phy(s): JAY MONTERROSO    For improved result formatting, select 'View Enhanced Report Format' under   Linked Documents section.    SPECIMEN(S):  Colon polyp    FINAL DIAGNOSIS:  Colon polyp:  - Tubular adenoma.  - Negative for high grade dysplasia or malignancy.    Electronically signed out by:    MOSHE Mccoy M.D.    CLINICAL HISTORY:  53-year-old male. Rectal pain, diagnostic.    GROSS:  The specimen is received in formalin with proper patient identification   labeled \"colon polyp at 80 cm\".  The  specimen consists of numerous tan polypoid structure is ranging from 0.2   cm up to 1.0 x 0.6 x 0.4 cm.  The  largest polyp is inked blue and the second largest polyp is inked black.    The two largest polyps are bisected.   The specimen " "is entirely submitted in one cassette. (Dictated by: Elvin Rojas 6/26/2019 01:29 PM)    MICROSCOPI C:  The sections show a portion of skin with an actinic keratosis consisting   of squamous epithelium with mild to  moderate atypia with overlying reactive parakeratosis. The dermis present   shows marked basophilic solar  elastosis with mild chronic perivascular lymphocytic inflammation. The   atypia extends to the radial margins of  the biopsy. No emily evidence of malignancy is seen in the form of severe   nuclear atypia, prominent loss of  cellular polarity or increased mitotic activity. (Dictated by: JOY Mccoy MD 06/27/2019)    The technical component of this testing was completed at the [facility], with the professional component performed   at the [facility], [address] [place] [phone] ([phone])    CPT Codes:  A: 14760-NK2    COLLECTION SITE:  Client: [facility]  Location: [facility])           ROS:  Constitutional - Denies fevers, weight loss, malaise, lethargy  Neuro - Denies tremors or seizures  Pulmon - Denies SOB, dyspnea, hemoptysis, chronic cough or use of an inhaler  CV - Denies CP, SOB, lower extremity edema, difficulty w/ stairs, has never used NTG  GI - Denies hematemesis, melena, chronic diarrhea or epigastric pain   - Denies hematuria, difficulty voiding, h/o STDs  Hematology - Denies blood clotting disorders, chronic anemias  Dermatology - No melanomas or skin cancers  Rheumatology - No h/o RA  Pysch - Denies depression, bipolar d/o or schizophrenia    /80 (BP Location: Right arm, Patient Position: Sitting, Cuff Size: Adult Regular)   Pulse 90   Temp 98  F (36.7  C) (Oral)   Ht 1.702 m (5' 7\")   Wt 79.4 kg (175 lb)   BMI 27.41 kg/m      Exam:  General - Alert and Oriented X4, NAD, well nourished  Neck - supple, " Carotids without bruits  Lungs -respirations unlabored   CV - Heart RRR  Abdomen - Soft, non-tender  Rectal -see colonoscopy note for rectal exam details   neuro - Full ROM, Strength 5/5 and major muscle groups, sensation intact  Extremities - No cyanosis, clubbing or edema.      Assessment and Plan: Anal fissure which is recalcitrant to conservative therapy with nifedipine gel and fiber therapy.  The next step would be to try out Botox.  He is interested in that.  We will go through the prior authorization process and have him return once we have received approval.  If that does not work then surgery would be indicated.  He understands the timeframe in the possible implications of Botox versus surgery.    I will see him back in 1 to 2 weeks for Botox once we get approval.  He should continue the gel for the time being.    Jay Monterroso MD FAC        Jay Monterroso MD

## 2019-07-22 NOTE — NURSING NOTE
"Initial /80 (BP Location: Right arm, Patient Position: Sitting, Cuff Size: Adult Regular)   Pulse 90   Temp 98  F (36.7  C) (Oral)   Ht 1.702 m (5' 7\")   Wt 79.4 kg (175 lb)   BMI 27.41 kg/m   Estimated body mass index is 27.41 kg/m  as calculated from the following:    Height as of this encounter: 1.702 m (5' 7\").    Weight as of this encounter: 79.4 kg (175 lb). .    Dot Sherwood CMA    "

## 2019-07-22 NOTE — LETTER
7/22/2019         RE: Edis Burroughs  [address]  [facility] [address]        Dear Colleague,    Thank you for referring your patient, Edis Burroughs, to the [facility]. Please see a copy of my visit note below.    PCP:  Ashwini Jessica    Chief complaint: No fissure    History of Present Illness: Patient is a healthy 53-year-old man who presents with a previously diagnosed anal fissure.  This was diagnosed at the time of colonoscopy.  Colonoscopy was unremarkable except for a small polyp which was benign.    He comes today after having used nifedipine gel for a month or so claiming that the pain is not improved in any way and is interested in trying Botox.    Histories:  History reviewed. No pertinent past medical history.    Past Surgical History:   Procedure Laterality Date     C APPENDECTOMY  1997     C LAMINECTOMY,FACETECTOMY,LUMBAR  2008       Family History   Problem Relation Age of Onset     Prostate Cancer Father      Heart Disease Father         open heart surgery     Cancer Father         lung     C.A.D. Father         triple bypass, first MI age 59     Cancer Sister         leukemia     Diabetes Maternal Grandmother      C.A.D. Maternal Grandmother      Diabetes Maternal Grandfather      C.A.D. Maternal Grandfather      Colon Cancer Maternal Grandfather      Cerebrovascular Disease Paternal Grandfather      C.A.D. Paternal Grandfather      C.A.D. Paternal Grandmother      Colon Cancer Maternal Uncle      Hypertension No family hx of      Breast Cancer No family hx of      Cancer - colorectal No family hx of        Social History     Tobacco Use     Smoking status: Never Smoker     Smokeless tobacco: Never Used   Substance Use Topics     Alcohol use: Yes     Alcohol/week: 0.0 oz     Comment: rarely       Current Outpatient Medications   Medication Sig Dispense Refill     divalproex (DEPAKOTE) 500 MG 24 hr tablet Take 500 mg by mouth daily.       finasteride (PROSCAR) 5 MG  tablet Take 1 tablet (5 mg) by mouth daily 90 tablet 3     hydrocortisone (ANUSOL-HC) 2.5 % cream Place rectally 2 times daily as needed for hemorrhoids 30 g 2     LamoTRIgine 300 MG TB24 Take  by mouth. One daily       losartan-hydrochlorothiazide (HYZAAR) 100-12.5 MG tablet Take 1 tablet by mouth daily 90 tablet 3     nifedipine 0.2% in white petrolatum 0.2 % OINT ointment Apply topically 2 times daily 30 g 3     QUEtiapine (SEROQUEL) 300 MG tablet Take 600 mg by mouth At Bedtime TID qhs       tamsulosin (FLOMAX) 0.4 MG capsule Take 1 capsule (0.4 mg) by mouth At Bedtime 90 capsule 3     ZOLOFT 100 MG OR TABS 2 TABLET DAILY 60 Tab 1       Allergies   Allergen Reactions     Vicodin [Acetaminophen] GI Disturbance     Stomach discomfort       Images:  No results found for this or any previous visit (from the past 744 hour(s)).    Labs:  Results for orders placed or performed during the hospital encounter of 06/25/19   COLONOSCOPY   Result Value Ref Range    COLONOSCOPY       _______________________________________________________________________________  Patient Name: Edis Burroughs             Procedure Date: 6/25/2019 12:44 PM  MRN: [id number]                       YOB: 1965  Admit Type: Outpatient                Age: 53  Gender: Male                          Attending MD: Jay Monterroso MD  Total Sedation Time:                    _______________________________________________________________________________     Procedure:           Colonoscopy  Indications:         Hematochezia  Providers:           Jay Monterroso MD, Nona Oden RN  Referring MD:        Han Ang MD  Medicines:           Monitored Anesthesia Care  Complications:       No immediate complications.  _______________________________________________________________________________  Procedure:           Pre-Anesthesia Assessment:                       - Prior to the procedure, a History and Physical was                         performed, and patient medications, allergies  and                        sensitivities were reviewed. The patient's tolerance of                        previous anesthesia was reviewed.                       - The risks and benefits of the procedure and the                        sedation options and risks were discussed with the                        patient. All questions were answered and informed                        consent was obtained.                       - Patient identification and proposed procedure were                        verified prior to the procedure by the physician, the                        nurse, the anesthetist and the technician. The procedure                        was verified in the pre-procedure area in the procedure                        room in the endoscopy suite.                       After obtaining informed consent, the colonoscope was                        passed under direct vision. Throughout the procedure,                        the patient's blood pressure, pulse, and oxygen                         saturations were monitored continuously. The Colonoscope                        was introduced through the anus and advanced to the                        cecum, identified by appendiceal orifice and ileocecal                        valve. The colonoscopy was performed without difficulty.                        The patient tolerated the procedure well. The quality of                        the bowel preparation was good.                                                                                   Findings:       Skin tags were found on perianal exam.       An anal fissure was found on perianal exam.       A 4 mm polyp was found at 80 cm proximal to the anus. The polyp was        pedunculated. The polyp was removed with a hot snare. Resection and        retrieval were complete. Verification of patient identification for the        specimen was done using the patient's  "name, birth date and medical        record number. Estimated blood loss: none.       The exam was oth erwise normal throughout the examined colon.       The retroflexed view of the distal rectum and anal verge was normal and        showed no anal or rectal abnormalities.                                                                                   Impression:          - Perianal skin tags found on perianal exam.                       - Anal fissure found on perianal exam.                       - One 4 mm polyp at 80 cm proximal to the anus, removed                        with a hot snare. Resected and retrieved.  Recommendation:      - Discharge patient to home (ambulatory).                       - High fiber diet indefinitely.                       - Await pathology results.                       - Repeat colonoscopy in 5 years for surveillance based                        on pathology results.                                                                                     Signed electronically by Jay Monterroso MD  _______________  Jay Monterroso MD  6/25/2019 1:51:18 PM  Number  of Addenda: 0    Note Initiated On: 6/25/2019 12:44 PM  Scope In: 1:05:25 PM  Scope Out: 1:36:28 PM     Surgical pathology exam   Result Value Ref Range    Copath Report       Patient Name: CAMILLE BRIGGS  MR#: [id number]  Specimen #: [id number]  Collected: 6/25/2019  Received: 6/26/2019  Reported: 6/27/2019 15:32  Ordering Phy(s): JAY MONTERROSO    For improved result formatting, select 'View Enhanced Report Format' under   Linked Documents section.    SPECIMEN(S):  Colon polyp    FINAL DIAGNOSIS:  Colon polyp:  - Tubular adenoma.  - Negative for high grade dysplasia or malignancy.    Electronically signed out by:    MOSHE Mccoy M.D.    CLINICAL HISTORY:  53-year-old male. Rectal pain, diagnostic.    GROSS:  The specimen is received in formalin with proper patient identification   labeled \"colon polyp at 80 cm\".  The  specimen " consists of numerous tan polypoid structure is ranging from 0.2   cm up to 1.0 x 0.6 x 0.4 cm.  The  largest polyp is inked blue and the second largest polyp is inked black.    The two largest polyps are bisected.   The specimen is entirely submitted in one cassette. (Dictated by: Elvin Rojas 6/26/2019 01:29 PM)    MICROSCOPI C:  The sections show a portion of skin with an actinic keratosis consisting   of squamous epithelium with mild to  moderate atypia with overlying reactive parakeratosis. The dermis present   shows marked basophilic solar  elastosis with mild chronic perivascular lymphocytic inflammation. The   atypia extends to the radial margins of  the biopsy. No emily evidence of malignancy is seen in the form of severe   nuclear atypia, prominent loss of  cellular polarity or increased mitotic activity. (Dictated by: JOY Mccoy MD 06/27/2019)    The technical component of this testing was completed at the [facility], with the professional component performed   at the [facility], [address] [place] [phone] ([phone])    CPT Codes:  A: 04741-BT4    COLLECTION SITE:  Client: [facility]  Location: [facility] (EPI)           ROS:  Constitutional - Denies fevers, weight loss, malaise, lethargy  Neuro - Denies tremors or seizures  Pulmon - Denies SOB, dyspnea, hemoptysis, chronic cough or use of an inhaler  CV - Denies CP, SOB, lower extremity edema, difficulty w/ stairs, has never used NTG  GI - Denies hematemesis, melena, chronic diarrhea or epigastric pain   - Denies hematuria, difficulty voiding, h/o STDs  Hematology - Denies blood clotting disorders, chronic anemias  Dermatology - No melanomas or skin cancers  Rheumatology - No h/o RA  Pysch - Denies depression, bipolar d/o or schizophrenia    /80 (BP Location: Right arm, Patient  "Position: Sitting, Cuff Size: Adult Regular)   Pulse 90   Temp 98  F (36.7  C) (Oral)   Ht 1.702 m (5' 7\")   Wt 79.4 kg (175 lb)   BMI 27.41 kg/m       Exam:  General - Alert and Oriented X4, NAD, well nourished  Neck - supple, Carotids without bruits  Lungs -respirations unlabored   CV - Heart RRR  Abdomen - Soft, non-tender  Rectal -see colonoscopy note for rectal exam details   neuro - Full ROM, Strength 5/5 and major muscle groups, sensation intact  Extremities - No cyanosis, clubbing or edema.      Assessment and Plan: Anal fissure which is recalcitrant to conservative therapy with nifedipine gel and fiber therapy.  The next step would be to try out Botox.  He is interested in that.  We will go through the prior authorization process and have him return once we have received approval.  If that does not work then surgery would be indicated.  He understands the timeframe in the possible implications of Botox versus surgery.    I will see him back in 1 to 2 weeks for Botox once we get approval.  He should continue the gel for the time being.    Jay Monterroso MD FAC        Jay Monterroso MD             Again, thank you for allowing me to participate in the care of your patient.        Sincerely,        Jay Monterroso MD    "

## 2019-07-23 ENCOUNTER — TELEPHONE (OUTPATIENT)
Dept: SURGERY | Facility: CLINIC | Age: 54
End: 2019-07-23

## 2019-07-24 NOTE — TELEPHONE ENCOUNTER
PA approved for qty 1unit from 7/30/19 to 1/25/2020    Patient notified     Yessenia Sneed CSS

## 2019-07-31 ENCOUNTER — OFFICE VISIT (OUTPATIENT)
Dept: SURGERY | Facility: CLINIC | Age: 54
End: 2019-07-31
Payer: COMMERCIAL

## 2019-07-31 VITALS
BODY MASS INDEX: 27.47 KG/M2 | TEMPERATURE: 96.4 F | HEIGHT: 67 IN | DIASTOLIC BLOOD PRESSURE: 75 MMHG | SYSTOLIC BLOOD PRESSURE: 132 MMHG | HEART RATE: 90 BPM | WEIGHT: 175 LBS

## 2019-07-31 DIAGNOSIS — K60.2 ANAL FISSURE: Primary | ICD-10-CM

## 2019-07-31 PROCEDURE — 46505 CHEMODENERVATION ANAL MUSC: CPT | Performed by: SURGERY

## 2019-07-31 ASSESSMENT — MIFFLIN-ST. JEOR: SCORE: 1592.42

## 2019-07-31 NOTE — PROGRESS NOTES
Edis returns to the clinic for injection of Botox.  He has had no relief at all with conservative treatment of his fissure.  He has exquisite pain with bowel movements and rectal exams are excruciating.    He has used the gel religiously and that has not been effective.    His insurance company has consented to allow Botox injection and that is why he presents today.    The risks benefits and alternatives of the procedure were discussed with the patient.  He consented to proceed.    PROCEDURE NOTE: Botox injection  100 units of Botox was mixed up with 1 cc of injectable saline.  0.4 cc was drawn up into each of 2 different tuberculin syringes.  Each syringe contained 40 units of Botox.    The patient was placed on his left side and the internal sphincter was identified by palpation.  40 units was injected into the internal sphincter on either side of the fissure.    He tolerated the procedure well.  No apparent complications.    I told him to expect results within a week, but if he does not feel like there is been any improvement then he should contact us and we will consider the next step which would be surgical sphincterotomy.    Jay Monterroso MD FACS

## 2019-07-31 NOTE — PROGRESS NOTES
Clinic Administered Medication Documentation      Injectable Medication Documentation    Patient was given Botox. Prior to medication administration, verified patients identity using patient s name and date of birth. Please see MAR and medication order for additional information. Patient instructed to report any adverse reaction to staff immediately .      Was entire vial of medication used? No, The remainder 20Units of 100Units was discarded as unavoidable waste.  Vial/Syringe: Single dose vial  Expiration Date:  09/2021  Was this medication supplied by the patient? No

## 2019-07-31 NOTE — NURSING NOTE
"Initial /75 (BP Location: Left arm, Patient Position: Sitting, Cuff Size: Adult Regular)   Pulse 90   Temp 96.4  F (35.8  C) (Tympanic)   Ht 1.702 m (5' 7\")   Wt 79.4 kg (175 lb)   BMI 27.41 kg/m   Estimated body mass index is 27.41 kg/m  as calculated from the following:    Height as of this encounter: 1.702 m (5' 7\").    Weight as of this encounter: 79.4 kg (175 lb). .    Dot Sherwood CMA    "

## 2019-08-14 ENCOUNTER — TELEPHONE (OUTPATIENT)
Dept: SURGERY | Facility: CLINIC | Age: 54
End: 2019-08-14

## 2019-08-14 NOTE — TELEPHONE ENCOUNTER
Reason for Call:  Other     Detailed comments: Pt is calling and states his injections (botox) are not helping at all - still in a lot of pain. He received them 2 weeks ago. Would like to discuss what happens next - does he need another set of injections? - Please advise    Phone Number Patient can be reached at: Home number on file [phone] (home)    Best Time: Any    Can we leave a detailed message on this number? YES    Call taken on 8/14/2019 at 9:31 AM by Denise Behrendt

## 2019-08-14 NOTE — TELEPHONE ENCOUNTER
I spoke to Edis today. He has been struggling with an anal fissure. He has done the topical nifedipine and more recently on 7-31-19 had the Botox fissure injection. He continues to use the topical. He has had minimal relief and no healing following the Botox injection and is wondering what the next step would be. I suggested that he should have an appointment with Dr Monterroso. They could discuss surgical sphincterotomy and if it would be beneficial to try another Botox injection despite the non therapeutic response.  I scheduled a 30 minute appointment for Edis with Dr Monterroso and onur Monterroso as well. Nona VIEIRA Rn

## 2019-08-19 ENCOUNTER — OFFICE VISIT (OUTPATIENT)
Dept: SURGERY | Facility: CLINIC | Age: 54
End: 2019-08-19
Payer: COMMERCIAL

## 2019-08-19 VITALS
SYSTOLIC BLOOD PRESSURE: 120 MMHG | HEIGHT: 67 IN | WEIGHT: 175 LBS | HEART RATE: 92 BPM | RESPIRATION RATE: 14 BRPM | DIASTOLIC BLOOD PRESSURE: 79 MMHG | BODY MASS INDEX: 27.47 KG/M2 | TEMPERATURE: 97.5 F

## 2019-08-19 DIAGNOSIS — K60.2 ANAL FISSURE: Primary | ICD-10-CM

## 2019-08-19 PROCEDURE — 99213 OFFICE O/P EST LOW 20 MIN: CPT | Performed by: SURGERY

## 2019-08-19 ASSESSMENT — MIFFLIN-ST. JEOR: SCORE: 1592.42

## 2019-08-19 NOTE — PATIENT INSTRUCTIONS
Per physician instructions.    If you have questions or concerns on any instructions given to you by your provider today or if you need to schedule an appointment, you can reach us at [phone].    Thank you!

## 2019-08-19 NOTE — LETTER
8/19/2019         RE: Edsi Burroughs  [address]  [place] [address]        Dear Colleague,    Thank you for referring your patient, Edis Burroughs, to the [facility]. Please see a copy of my visit note below.    Edis is in for a recheck.  About 3 weeks ago we injected his internal sphincter with some Botox.  It took a little while but it has improved his symptoms significantly.  He is using stool softeners and reports that it does not hurt so much to have a bowel movement anymore.  He does have flareups of pain maybe once a day but nothing like it was before.    On exam: The perianal area looks relatively normal.  I can see the fissure and it does not look very deep.  I did not do a rectal exam today.    I advised him that since he has had a response I would continue doing what he is doing.  The Botox is clearly done its job and I think with continued use of the nifedipine he will heal.    I told him he should return in a month if he still having symptoms in the meantime she can continue doing exactly what he is doing with stool softeners and the gel.  If there is no healing in a month that we can talk about sphincterotomy but that really is the only other option at this point if he does not heal appropriately.    Jay Monterroso MD FACS    Again, thank you for allowing me to participate in the care of your patient.        Sincerely,        Jay Monterroso MD

## 2019-08-19 NOTE — NURSING NOTE
"Chief Complaint   Patient presents with     Consult     discuss surgical sphincterotomy       Initial /79 (BP Location: Right arm, Patient Position: Chair, Cuff Size: Adult Regular)   Pulse 92   Temp 97.5  F (36.4  C) (Tympanic)   Resp 14   Ht 1.702 m (5' 7\")   Wt 79.4 kg (175 lb)   BMI 27.41 kg/m   Estimated body mass index is 27.41 kg/m  as calculated from the following:    Height as of this encounter: 1.702 m (5' 7\").    Weight as of this encounter: 79.4 kg (175 lb).  Medications and allergies reviewed.    Leah WELLINGTON CMA ([facility])    "

## 2019-08-19 NOTE — PROGRESS NOTES
Edis is in for a recheck.  About 3 weeks ago we injected his internal sphincter with some Botox.  It took a little while but it has improved his symptoms significantly.  He is using stool softeners and reports that it does not hurt so much to have a bowel movement anymore.  He does have flareups of pain maybe once a day but nothing like it was before.    On exam: The perianal area looks relatively normal.  I can see the fissure and it does not look very deep.  I did not do a rectal exam today.    I advised him that since he has had a response I would continue doing what he is doing.  The Botox is clearly done its job and I think with continued use of the nifedipine he will heal.    I told him he should return in a month if he still having symptoms in the meantime she can continue doing exactly what he is doing with stool softeners and the gel.  If there is no healing in a month that we can talk about sphincterotomy but that really is the only other option at this point if he does not heal appropriately.    Jay Monterroso MD FACS

## 2019-08-26 DIAGNOSIS — K60.2 ANAL FISSURE: ICD-10-CM

## 2019-08-29 ENCOUNTER — TELEPHONE (OUTPATIENT)
Dept: SURGERY | Facility: CLINIC | Age: 54
End: 2019-08-29

## 2019-08-29 DIAGNOSIS — K60.2 ANAL FISSURE: ICD-10-CM

## 2019-08-29 DIAGNOSIS — K62.89 RECTAL PAIN: Primary | ICD-10-CM

## 2019-08-29 NOTE — TELEPHONE ENCOUNTER
Reason for Call:  Other prescription    Detailed comments: [facility] faxed request stating unable to compound 5% lido and 0.2% Nifed as a gel. Can we compound as a ointment?     Phone Number Patient can be reached at: Other phone number:  [phone]*    Best Time: any     Can we leave a detailed message on this number? YES    Call taken on 8/29/2019 at 2:10 PM by Yessenia Strickland

## 2019-08-29 NOTE — TELEPHONE ENCOUNTER
Spoke to pharmacy and ok'd for compound to be ointment.   Beatriz HAMILTON RN BSN PHN  [facility]

## 2019-08-29 NOTE — TELEPHONE ENCOUNTER
Pt came to clinic and stated that he picked up his nifedipine and [facility] forgot to put lidocaine in it.  The script was refilled and the note to mix with lidocaine did not transfer to new script. Pt was given a small tube of lidocaine to use and script was sent for lidocaine to [facility] for patient.  New script was also sent for the compound and to hold for pt request to refill.   Beatriz HAMILTON RN BSN PHN  [facility]

## 2019-08-30 NOTE — TELEPHONE ENCOUNTER
Pt states the wrong RX was sent the first time, should have been the gel. Pt picked up the wrong one, now wants Pharmacy to return money for wrong one, he has since picked up right one but wants money refunded because of doctors mistake. Please call pt to discuss [phone]

## 2019-08-30 NOTE — TELEPHONE ENCOUNTER
Spoke with patient and he stated that he cannot use the nifedipine that was ordered as he is having a reaction to the ointment. Advised that pt mix it with the lidocaine that was ordered. Pt said no that he did not want to use this with the reaction he was having and would like a new script sent to pharmacy here at [facility]. Pt stated that he does not have the money to pay for the script though as he already paid for the script that was sent to [facility] and was incorrect. Discussed with provider and he srated there was nothing cheaper for the pt to use. Called [facility] and they cannot return the nifedipine. Option decided upon was to send a new script to [facility] and have them run a PA and see if we cant get insurance to help cover the cost. Also to send info to manager to discuss options.   Beatriz HAMILTON RN BSN PHN  [facility]

## 2019-09-04 NOTE — TELEPHONE ENCOUNTER
Pt calling to check status of message from Friday. He has not heard anything further, was a new med sent to pharmacy to try for PA?       Please call    Yessenia Strickland  Specialty CSS

## 2019-09-05 NOTE — TELEPHONE ENCOUNTER
Called Edis and advised to call [facility] and let them know to test run the rx (or if need it changed to somehow specify or be written in a different way to avoid the petroleum jelly)   Pt advised o have Pharmacy tell us what they need us to do to help.    Annia Mercado RN  [facility]

## 2019-09-05 NOTE — TELEPHONE ENCOUNTER
Contacted Pharmacy.  Pharmacy agreeable to [facility] and can mix the compound for $22 charge.  Pt is agreeable to that price and is very thankful to us in resolving the issue for him.    Annia Mercado RN  [facility]

## 2019-10-03 ENCOUNTER — IMMUNIZATION (OUTPATIENT)
Dept: FAMILY MEDICINE | Facility: CLINIC | Age: 54
End: 2019-10-03
Payer: COMMERCIAL

## 2019-10-03 DIAGNOSIS — Z79.899 MEDICATION MANAGEMENT: Primary | ICD-10-CM

## 2019-10-03 DIAGNOSIS — Z79.899 ENCOUNTER FOR LONG-TERM CURRENT USE OF MEDICATION: ICD-10-CM

## 2019-10-03 LAB
ALT SERPL W P-5'-P-CCNC: 41 U/L (ref 0–70)
AST SERPL W P-5'-P-CCNC: 19 U/L (ref 0–45)
VALPROATE SERPL-MCNC: 37 MG/L (ref 50–100)

## 2019-10-03 PROCEDURE — 36415 COLL VENOUS BLD VENIPUNCTURE: CPT | Performed by: PSYCHIATRY & NEUROLOGY

## 2019-10-03 PROCEDURE — 90682 RIV4 VACC RECOMBINANT DNA IM: CPT

## 2019-10-03 PROCEDURE — 84460 ALANINE AMINO (ALT) (SGPT): CPT | Performed by: PSYCHIATRY & NEUROLOGY

## 2019-10-03 PROCEDURE — 90471 IMMUNIZATION ADMIN: CPT

## 2019-10-03 PROCEDURE — 84450 TRANSFERASE (AST) (SGOT): CPT | Performed by: PSYCHIATRY & NEUROLOGY

## 2019-10-03 PROCEDURE — 80164 ASSAY DIPROPYLACETIC ACD TOT: CPT | Performed by: PSYCHIATRY & NEUROLOGY

## 2019-11-07 ENCOUNTER — HEALTH MAINTENANCE LETTER (OUTPATIENT)
Age: 54
End: 2019-11-07

## 2020-01-03 ENCOUNTER — OFFICE VISIT (OUTPATIENT)
Dept: FAMILY MEDICINE | Facility: CLINIC | Age: 55
End: 2020-01-03
Payer: COMMERCIAL

## 2020-01-03 VITALS
TEMPERATURE: 97.6 F | SYSTOLIC BLOOD PRESSURE: 110 MMHG | WEIGHT: 183.38 LBS | RESPIRATION RATE: 16 BRPM | HEART RATE: 80 BPM | BODY MASS INDEX: 28.78 KG/M2 | DIASTOLIC BLOOD PRESSURE: 82 MMHG | HEIGHT: 67 IN

## 2020-01-03 DIAGNOSIS — F31.9 BIPOLAR AFFECTIVE DISORDER, REMISSION STATUS UNSPECIFIED (H): ICD-10-CM

## 2020-01-03 DIAGNOSIS — Z12.5 SCREENING FOR PROSTATE CANCER: ICD-10-CM

## 2020-01-03 DIAGNOSIS — I10 HYPERTENSION, GOAL BELOW 140/90: ICD-10-CM

## 2020-01-03 DIAGNOSIS — K40.90 LEFT INGUINAL HERNIA: Primary | ICD-10-CM

## 2020-01-03 LAB
ANION GAP SERPL CALCULATED.3IONS-SCNC: 4 MMOL/L (ref 3–14)
BUN SERPL-MCNC: 19 MG/DL (ref 7–30)
CALCIUM SERPL-MCNC: 8.8 MG/DL (ref 8.5–10.1)
CHLORIDE SERPL-SCNC: 110 MMOL/L (ref 94–109)
CO2 SERPL-SCNC: 28 MMOL/L (ref 20–32)
CREAT SERPL-MCNC: 1.04 MG/DL (ref 0.66–1.25)
ERYTHROCYTE [DISTWIDTH] IN BLOOD BY AUTOMATED COUNT: 12.4 % (ref 10–15)
GFR SERPL CREATININE-BSD FRML MDRD: 80 ML/MIN/{1.73_M2}
GLUCOSE SERPL-MCNC: 86 MG/DL (ref 70–99)
HCT VFR BLD AUTO: 43.6 % (ref 40–53)
HGB BLD-MCNC: 15 G/DL (ref 13.3–17.7)
MCH RBC QN AUTO: 30 PG (ref 26.5–33)
MCHC RBC AUTO-ENTMCNC: 34.4 G/DL (ref 31.5–36.5)
MCV RBC AUTO: 87 FL (ref 78–100)
PLATELET # BLD AUTO: 251 10E9/L (ref 150–450)
POTASSIUM SERPL-SCNC: 3.9 MMOL/L (ref 3.4–5.3)
PSA SERPL-ACNC: 1.61 UG/L (ref 0–4)
RBC # BLD AUTO: 5 10E12/L (ref 4.4–5.9)
SODIUM SERPL-SCNC: 142 MMOL/L (ref 133–144)
WBC # BLD AUTO: 6.3 10E9/L (ref 4–11)

## 2020-01-03 PROCEDURE — 85027 COMPLETE CBC AUTOMATED: CPT | Performed by: FAMILY MEDICINE

## 2020-01-03 PROCEDURE — 36415 COLL VENOUS BLD VENIPUNCTURE: CPT | Performed by: FAMILY MEDICINE

## 2020-01-03 PROCEDURE — 99214 OFFICE O/P EST MOD 30 MIN: CPT | Performed by: FAMILY MEDICINE

## 2020-01-03 PROCEDURE — 93000 ELECTROCARDIOGRAM COMPLETE: CPT | Performed by: FAMILY MEDICINE

## 2020-01-03 PROCEDURE — G0103 PSA SCREENING: HCPCS | Performed by: FAMILY MEDICINE

## 2020-01-03 PROCEDURE — 80048 BASIC METABOLIC PNL TOTAL CA: CPT | Performed by: FAMILY MEDICINE

## 2020-01-03 ASSESSMENT — MIFFLIN-ST. JEOR: SCORE: 1625.41

## 2020-01-03 ASSESSMENT — PAIN SCALES - GENERAL: PAINLEVEL: NO PAIN (0)

## 2020-01-03 NOTE — PATIENT INSTRUCTIONS
*   This is an inguinal hernia.     *    Next step: see a surgeon for an operation to fix it. There's no other options. [phone].     *    Blood tests today and EKG for surgery.    *    Glad the blood pressure is better.

## 2020-01-03 NOTE — PROGRESS NOTES
"Subjective     Edis Burroughs is a 54 year old male who presents to clinic today for the following health issues:    HPI     Hernia  Patient reports having a left sided inguinal hernia x6 months. Area becomes more inflamed and painful intermittently, especially if coughing. No injury.      Reviewed and updated as needed this visit by Provider         Review of Systems   ROS COMP: CONSTITUTIONAL:NEGATIVE for fever, chills, change in weight  INTEGUMENTARY/SKIN: NEGATIVE for worrisome rashes, moles or lesions  RESP:NEGATIVE for significant cough or SOB  CV: NEGATIVE for chest pain, palpitations or peripheral edema  GI: NEGATIVE for nausea, abdominal pain, heartburn, or change in bowel habits  MUSCULOSKELETAL: POSITIVE for mass inguinal left side  PSYCHIATRIC: NEGATIVE for changes in mood or affect      Objective    /82   Pulse 80   Temp 97.6  F (36.4  C) (Tympanic)   Resp 16   Ht 1.702 m (5' 7\")   Wt 83.2 kg (183 lb 6 oz)   BMI 28.72 kg/m    Body mass index is 28.72 kg/m .  Physical Exam   GENERAL: healthy, alert and no distress  RESP: lungs clear to auscultation - no rales, rhonchi or wheezes  CV: regular rate and rhythm, normal S1 S2  ABDOMEN: soft, nontender   (male): Reducible mass, right inguinal canal.  MS: extremities normal- no gross deformities noted  SKIN: no suspicious lesions or rashes  PSYCH: mentation appears normal, affect normal/bright    Diagnostic Test Results:    Results for orders placed or performed in visit on 01/03/20   PSA, screen     Status: None   Result Value Ref Range    PSA 1.61 0 - 4 ug/L   Basic metabolic panel     Status: Abnormal   Result Value Ref Range    Sodium 142 133 - 144 mmol/L    Potassium 3.9 3.4 - 5.3 mmol/L    Chloride 110 (H) 94 - 109 mmol/L    Carbon Dioxide 28 20 - 32 mmol/L    Anion Gap 4 3 - 14 mmol/L    Glucose 86 70 - 99 mg/dL    Urea Nitrogen 19 7 - 30 mg/dL    Creatinine 1.04 0.66 - 1.25 mg/dL    GFR Estimate 80 >60 mL/min/[1.73_m2]    GFR Estimate If " "Black >90 >60 mL/min/[1.73_m2]    Calcium 8.8 8.5 - 10.1 mg/dL   CBC with platelets     Status: None   Result Value Ref Range    WBC 6.3 4.0 - 11.0 10e9/L    RBC Count 5.00 4.4 - 5.9 10e12/L    Hemoglobin 15.0 13.3 - 17.7 g/dL    Hematocrit 43.6 40.0 - 53.0 %    MCV 87 78 - 100 fl    MCH 30.0 26.5 - 33.0 pg    MCHC 34.4 31.5 - 36.5 g/dL    RDW 12.4 10.0 - 15.0 %    Platelet Count 251 150 - 450 10e9/L      EKG done also, normal sinus rhythm without ischemic changes.        Assessment & Plan     (K40.90) Left inguinal hernia  (primary encounter diagnosis)  Comment: Reviewed treatment options, referred to surgery for consultation.  Preoperative evaluation done, no contraindications to proceeding with surgery.  Plan: GENERAL SURG ADULT REFERRAL      (Z12.5) Screening for prostate cancer  Comment: Within normal range.  Reassuring that there is no signs of prostate cancer.  Plan: PSA, screen        Recheck yearly until age 74.    (F31.9) Bipolar affective disorder, remission status unspecified (H)  Comment: Doing well with current medications including SSRI therapy, mood stabilizer, to atypical antipsychotics.  Plan: Continue with follow-up per psychiatry.    (I10) Hypertension, goal below 140/90  Comment: Within guidelines.  Normal renal function, normal potassium.  Plan: Basic metabolic panel, CBC with platelets, EKG         12-lead complete w/read - Clinics           BMI:   Estimated body mass index is 28.72 kg/m  as calculated from the following:    Height as of this encounter: 1.702 m (5' 7\").    Weight as of this encounter: 83.2 kg (183 lb 6 oz).   Weight management plan: Discussed healthy diet and exercise guidelines    Patient Instructions   *   This is an inguinal hernia.     *    Next step: see a surgeon for an operation to fix it. There's no other options. [phone].     *    Blood tests today and EKG for surgery.    *    Glad the blood pressure is better.          Return in about 1 year (around 1/3/2021) " for Routine Visit.    The information in this document, created by a scribe for me, accurately reflects the services I personally performed and the decisions made by me. I have reviewed and approved this document for accuracy.     Ashwini Jessica MD  [facility]

## 2020-01-07 ENCOUNTER — OFFICE VISIT (OUTPATIENT)
Dept: UROLOGY | Facility: CLINIC | Age: 55
End: 2020-01-07
Payer: COMMERCIAL

## 2020-01-07 ENCOUNTER — TELEPHONE (OUTPATIENT)
Dept: UROLOGY | Facility: CLINIC | Age: 55
End: 2020-01-07

## 2020-01-07 VITALS — OXYGEN SATURATION: 98 % | DIASTOLIC BLOOD PRESSURE: 83 MMHG | SYSTOLIC BLOOD PRESSURE: 135 MMHG | HEART RATE: 88 BPM

## 2020-01-07 DIAGNOSIS — N40.1 BENIGN PROSTATIC HYPERPLASIA WITH LOWER URINARY TRACT SYMPTOMS, SYMPTOM DETAILS UNSPECIFIED: Primary | ICD-10-CM

## 2020-01-07 PROCEDURE — 99214 OFFICE O/P EST MOD 30 MIN: CPT | Mod: 25 | Performed by: UROLOGY

## 2020-01-07 PROCEDURE — 51798 US URINE CAPACITY MEASURE: CPT | Performed by: UROLOGY

## 2020-01-07 NOTE — TELEPHONE ENCOUNTER
Type of surgery: XPS LASER TURP CPT code 98844  Benign prostatic hyperplasia with lower urinary tract symptoms, symptom details unspecified [N40.1]  - Primary   Location of surgery: [facility]  Date and time of surgery: 1-27-20  Surgeon: Dr. Solis  Pre-Op Appt Date: 1-3-20  Post-Op Appt Date: 3-9-20   Packet sent out: Yes  Pre-cert/Authorization completed:  Transaction ID: [id number] ID: [id number] Date: 2020-01-08  No Authorization Required  Group Number  [id number]  Line of Business  MEDICARE ADVANTAGE  Date of Service  2020-01-27  Message  Programs managed by [facility]: Medical Oncology, Molecular/Genetic Lab, Musculoskeletal, Radiation Therapy Program, Radiology/Cardiology, Sleep Management, Post-Acute Care Program (Includes Post-Acute Inpatient Care, Home Health and all DME)    Procedure Code 1  14862    Reference Number  [id number]  Status  NO AUTH REQUIRED  Date: 01/08/2020    Insurance valid. Sent to  and was received. 01/08/2020    No prior auth required per [facility]. Reference number is [id number]

## 2020-01-07 NOTE — PROGRESS NOTES
Chief Complaint   Patient presents with     KERA       Edis Burroughs is a 54 year old male who presents today for follow up of   Chief Complaint   Patient presents with     KERA   Patient is here for follow-up of his BPH.  He is currently on Flomax and Proscar.  Despite taking both medications, he still has a lot of obstructive and irritative voiding symptoms.  His most recent actual PSA is 3.2.    Current Outpatient Medications   Medication Sig Dispense Refill     divalproex (DEPAKOTE) 500 MG 24 hr tablet Take 500 mg by mouth daily.       finasteride (PROSCAR) 5 MG tablet Take 1 tablet (5 mg) by mouth daily 90 tablet 3     hydrocortisone (ANUSOL-HC) 2.5 % cream Place rectally 2 times daily as needed for hemorrhoids 30 g 2     LamoTRIgine 300 MG TB24 Take  by mouth. One daily       losartan-hydrochlorothiazide (HYZAAR) 100-12.5 MG tablet Take 1 tablet by mouth daily 90 tablet 3     nifedipine 0.2% in white petrolatum 0.2 % OINT ointment Apply topically 2 times daily 30 g 3     QUEtiapine (SEROQUEL) 300 MG tablet Take 600 mg by mouth At Bedtime TID qhs       tamsulosin (FLOMAX) 0.4 MG capsule Take 1 capsule (0.4 mg) by mouth At Bedtime 90 capsule 3     ZOLOFT 100 MG OR TABS 2 TABLET DAILY 60 Tab 1     Allergies   Allergen Reactions     Vicodin [Acetaminophen] GI Disturbance     Stomach discomfort      No past medical history on file.  Past Surgical History:   Procedure Laterality Date     C APPENDECTOMY  1997     C LAMINECTOMY,FACETECTOMY,LUMBAR  2008     Family History   Problem Relation Age of Onset     Prostate Cancer Father      Heart Disease Father         open heart surgery     Cancer Father         lung     C.A.D. Father         triple bypass, first MI age 59     Cancer Sister         leukemia     Diabetes Maternal Grandmother      C.A.D. Maternal Grandmother      Diabetes Maternal Grandfather      C.A.D. Maternal Grandfather      Colon Cancer Maternal Grandfather      Cerebrovascular Disease Paternal  Grandfather      GIOVANNY Paternal Grandfather      LUCHO. Paternal Grandmother      Colon Cancer Maternal Uncle      Hypertension No family hx of      Breast Cancer No family hx of      Cancer - colorectal No family hx of      Social History     Socioeconomic History     Marital status:      Spouse name: None     Number of children: None     Years of education: None     Highest education level: None   Occupational History     None   Social Needs     Financial resource strain: None     Food insecurity:     Worry: None     Inability: None     Transportation needs:     Medical: None     Non-medical: None   Tobacco Use     Smoking status: Never Smoker     Smokeless tobacco: Never Used   Substance and Sexual Activity     Alcohol use: Yes     Alcohol/week: 0.0 standard drinks     Comment: rarely     Drug use: No     Sexual activity: Yes     Partners: Female   Lifestyle     Physical activity:     Days per week: None     Minutes per session: None     Stress: None   Relationships     Social connections:     Talks on phone: None     Gets together: None     Attends Anabaptism service: None     Active member of club or organization: None     Attends meetings of clubs or organizations: None     Relationship status: None     Intimate partner violence:     Fear of current or ex partner: None     Emotionally abused: None     Physically abused: None     Forced sexual activity: None   Other Topics Concern     Parent/sibling w/ CABG, MI or angioplasty before 65F 55M? No   Social History Narrative     None       REVIEW OF SYSTEMS  =================  C: NEGATIVE for fever, chills, change in weight  I: NEGATIVE for worrisome rashes, moles or lesions  E/M: NEGATIVE for ear, mouth and throat problems  R: NEGATIVE for significant cough or SHORTNESS OF BREATH  CV:  NEGATIVE for chest pain, palpitations or peripheral edema  GI: NEGATIVE for nausea, abdominal pain, heartburn, or change in bowel habits  NEURO: NEGATIVE  numbness/weakness  : see HPI  PSYCH: NEGATIVE depression/anxiety  LYmph: no new enlarged lymph nodes  Ortho: no new trauma/movements    Physical Exam:  /83 (BP Location: Right arm, Patient Position: Chair, Cuff Size: Adult Large)   Pulse 88   SpO2 98%    Patient is pleasant, in no acute distress, good general condition.  Lung: no evidence of respiratory distress    Abdomen: Soft, nondistended, non tender. No masses. No rebound or guarding.   Exam: No CVA tenderness.  Bladder scan with residual urine of less than 100 mL.  Skin: Warm and dry.  No redness.  Psych: normal mood and affect  Neuro: alert and oriented  Musculaskeletal: moving all extremities    Assessment/Plan:   (N40.1) Benign prostatic hyperplasia with lower urinary tract symptoms, symptom details unspecified  (primary encounter diagnosis)  Comment: Treatment options discussed at length.  We discussed medical management versus office procedures versus surgical option.  Pros and cons discussed.  Patient is interested in expressed laser TURP.  Plan: Video of XPS laser TURP reviewed.  Risks of bleeding, infection, retrograde ejaculation, incontinence, and erectile dysfunction discussed.  I will schedule his elective procedure in the near future.  Information about  surgery provided to patient today.

## 2020-01-07 NOTE — PATIENT INSTRUCTIONS
Surgery Preparation    You will receive a phone call from the [facility] within 1-2 days. If you do not receive a call within 2 days, contact [phone] to schedule the procedure. You can write the location/date/time of surgery in the space provided below for your records.    Location of Surgery:                                          Date of Surgery:                                                 Time of Arrival:                                                   Time of Surgery:                                                   Please arrange an appointment with a primary care provider for a pre-op physical. This is a mandatory appointment that needs to be completed within the two weeks prior to your surgery date. This gives clearance for you to receive anesthesia during your procedure. If you have had a pre-op physical within 30 days of your surgery date, you may not need another one. Check with your provider.    If you are having a Same Day Surgery, you must have a  to and from the procedure. Someone needs to stay with you post-operatively for 12 hours.     Do not eat or drink any solids, milk products, or pulpy juices after midnight the night before your surgery. You may have clear liquids up to 8 hours prior to the surgery. This would include water, soda, coffee (without cream), tea, broth, and jello.    Please stop all over the counter blood thinners such as Aspirin, Advil, Aleve, Ibuprofen, Motrin, and Excedrin one week prior to surgery. Please discontinue prescription blood thinners 5-7 days prior to surgery, per your primary physician's orders.     Please check with your insurance company to confirm that your procedure is covered, and that the facility is in-network.     Call the Urology Department @ [phone] if you have questions about your surgery, or if you need to reschedule your procedure.   Patient Education     TURP  You have an enlarged prostate gland. This is also  called benign prostatic hyperplasia (BPH). BPH is not cancer, but it can cause problems with urination. To relieve your symptoms, your healthcare provider may recommend laser prostatectomy. This procedure uses a laser (concentrated light energy). The laser removes prostate tissue from around the urethra. The urethra is the tube that carries urine from the bladder out of the body. The surgery lets urine flow more freely. The laser destroys the prostate tissue. This means it can t be looked at for signs of cancer.     Types of prostate laser surgery  The type of laser surgery your healthcare provider will do may depend on your health, the size of your prostate, and the type of tools available. The different types of prostate laser surgery are:    Photoselective vaporization of the prostate (PVP). The laser is used to vaporize or melt away the extra prostate tissue.    Holmium laser ablation of the prostate (HoLAP). This type of surgery is similar to PVP, but uses a different kind of laser.    Holmium laser enucleation of the prostate (HoLEP). In this procedure the laser cuts and removes extra tissue. A tool cuts the tissue into small pieces. This makes them easier to remove.   Possible risks and side effects of laser prostatectomy    Bleeding    Infection    Pneumonia    Blood clots    Scarring or narrowing of the urethra. This can cause trouble urinating.    Only some relief of symptoms    Erectile dysfunction (rare)    Loss of bladder control (very rare)    Loss of ejaculation  Getting ready for the procedure  Your healthcare team will give you detailed instructions on how to get ready for the procedure. Be sure to follow them.    Tell your healthcare team about all medicines you take. This includes prescription and over-the-counter medicines, vitamins, herbs, and other supplements. It also includes any blood thinners such as warfarin, clopidogrel, or daily aspirin. You may need to stop taking some or all of them  before surgery.    You will probably be told not to eat or drink anything after midnight on the night before your procedure.    When you arrive for the procedure, you will have an IV (intravenous) line placed. This gives you fluids and medicines during the procedure.    You will be given medicine to keep you from feeling pain (anesthesia) before the procedure. You may have 1 or more of the following:  ? Local anesthesia. Your bladder and urethra are numbed.  ? Regional anesthesia. Your body below the waist is numbed.  ? General anesthesia. You are in a state like deep sleep.  During the procedure  Your healthcare provider can help explain the details of your surgery. These details depend on the type of laser surgery that will be done. In general, you can expect the following:    The procedure itself usually takes less than an hour.    A thin, tube-like telescope (cystoscope) is put through the opening in your penis and into your urethra. This tool lets your provider see your urethra and your prostate, either through the cystoscope or on a video screen.    The laser is put through the cystoscope. The laser is then used to destroy the extra prostate tissue.    You may get a urinary catheter. This helps your bladder drain for a short time after the procedure. It s removed when it s no longer needed.  After the procedure  You may go home the same day after your surgery. Or you may stay a night in the hospital. An adult friend or family member should drive you home. To get the best results from your laser prostatectomy, follow your healthcare provider's instructions. Keep your follow-up appointments.    Your prostate will likely be sore at first. This will get better as you heal. Here are some things you can expect:    You may be sent home with a catheter to drain urine from your bladder. If so, you may wear a leg bag until it's no longer needed. The catheter will allow the area to heal and help you avoid painful  urination.    Your provider may also prescribe antibiotics to prevent infection and pain medicine to ease any discomfort.    In about a week, you ll visit your provider to have your catheter removed. If swelling still makes urination difficult, the catheter may be left in longer. After the catheter is removed, you may need to urinate more often. This is normal and should get better with time.    For the first few weeks after your procedure, you may notice that your urine is cloudy. Or you may have blood or blood clots in your urine. This is normal while your body rids itself of the treated tissue. These symptoms may begin to get better during the first few weeks. But it may take a few months before they go away. Your provider can tell you when you can have sex again and when you can go back to work.    You also may be told to avoid lifting anything over 10 pounds or bending over to lift things from the ground.    You should drink plenty of fluids to help flush out your bladder.  Getting back to sex  You may be glad to know that BPH and its treatments rarely cause problems with sex. You may find that you have retrograde ejaculation. This happens when semen goes into the bladder instead of the urethra during ejaculation. Retrograde ejaculation is common after procedures for BPH. But even if this does occur, orgasm shouldn t feel any different than it did before the procedure. And it should not cause any health problems or affect your sexual function. If you notice any problems with sex, talk with your healthcare provider. Help may be available.  When to call your healthcare provider  Contact your healthcare provider right away if:    You have a fever of 100.4 F (38 C) or higher, or as directed by your provider    You have excessive bleeding    You have pain not relieved by medicines    You notice that no urine is draining from the catheter or the catheter falls out    You have a frequent or very strong urge to  urinate    You re not able to urinate, or notice a decrease in urine flow   Date Last Reviewed: 2/1/2017 2000-2019 [employer] [address] [place] [address], [place] [address]. All rights reserved. This information is not intended as a substitute for professional medical care. Always follow your healthcare professional's instructions.

## 2020-01-13 NOTE — TELEPHONE ENCOUNTER
Patient is back wanting to talk to someone said he has left 3 voicemails, please call back at  [phone] regarding his procedure.

## 2020-01-15 ENCOUNTER — OFFICE VISIT (OUTPATIENT)
Dept: SURGERY | Facility: CLINIC | Age: 55
End: 2020-01-15
Payer: COMMERCIAL

## 2020-01-15 VITALS
TEMPERATURE: 97.2 F | BODY MASS INDEX: 28.79 KG/M2 | RESPIRATION RATE: 16 BRPM | HEIGHT: 67 IN | HEART RATE: 77 BPM | SYSTOLIC BLOOD PRESSURE: 125 MMHG | DIASTOLIC BLOOD PRESSURE: 73 MMHG | WEIGHT: 183.42 LBS

## 2020-01-15 DIAGNOSIS — K40.90 LEFT INGUINAL HERNIA: ICD-10-CM

## 2020-01-15 PROCEDURE — 99214 OFFICE O/P EST MOD 30 MIN: CPT | Performed by: SURGERY

## 2020-01-15 ASSESSMENT — MIFFLIN-ST. JEOR: SCORE: 1625.63

## 2020-01-15 NOTE — PATIENT INSTRUCTIONS
Per physician instructions.    If you have questions or concerns on any instructions given to you by your provider today or if you need to schedule an appointment, you can reach us at [phone].    Thank you!

## 2020-01-15 NOTE — LETTER
1/15/2020         RE: Edis Burroughs  [address] [facility] [address]        Dear Colleague,    Thank you for referring your patient, Edis Burroughs, to the [facility]. Please see a copy of my visit note below.    54-year-old male complaint of left groin mass for the past 7 months.  Patient does occasional heavy lifting and noticed a bulge in his left groin.  Sometimes it is so bad he cannot wear jeans as it is too constricting.  He reports localized discomfort 1-2 out of 10 without radiation.  Aggravated by straining and alleviated by rest.  Patient denies fevers chills nausea and vomiting.    Patient Active Problem List   Diagnosis     Bipolar affective disorder, remission status unspecified (H)     Hypertension, goal below 140/90     Tubular adenoma of colon       History reviewed. No pertinent past medical history.    Past Surgical History:   Procedure Laterality Date     C APPENDECTOMY  1997     C LAMINECTOMY,FACETECTOMY,LUMBAR  2008       Family History   Problem Relation Age of Onset     Prostate Cancer Father      Heart Disease Father         open heart surgery     Cancer Father         lung     C.A.D. Father         triple bypass, first MI age 59     Cancer Sister         leukemia     Diabetes Maternal Grandmother      C.A.D. Maternal Grandmother      Diabetes Maternal Grandfather      C.A.D. Maternal Grandfather      Colon Cancer Maternal Grandfather      Cerebrovascular Disease Paternal Grandfather      C.A.D. Paternal Grandfather      C.A.D. Paternal Grandmother      Colon Cancer Maternal Uncle      Hypertension No family hx of      Breast Cancer No family hx of      Cancer - colorectal No family hx of        Social History     Tobacco Use     Smoking status: Never Smoker     Smokeless tobacco: Never Used   Substance Use Topics     Alcohol use: Yes     Alcohol/week: 0.0 standard drinks     Comment: rarely        History   Drug Use No       Current Outpatient Medications  "  Medication Sig Dispense Refill     divalproex (DEPAKOTE) 500 MG 24 hr tablet Take 500 mg by mouth daily.       finasteride (PROSCAR) 5 MG tablet Take 1 tablet (5 mg) by mouth daily 90 tablet 3     hydrocortisone (ANUSOL-HC) 2.5 % cream Place rectally 2 times daily as needed for hemorrhoids 30 g 2     LamoTRIgine 300 MG TB24 Take  by mouth. One daily       losartan-hydrochlorothiazide (HYZAAR) 100-12.5 MG tablet Take 1 tablet by mouth daily 90 tablet 3     nifedipine 0.2% in white petrolatum 0.2 % OINT ointment Apply topically 2 times daily 30 g 3     QUEtiapine (SEROQUEL) 300 MG tablet Take 600 mg by mouth At Bedtime TID qhs       tamsulosin (FLOMAX) 0.4 MG capsule Take 1 capsule (0.4 mg) by mouth At Bedtime 90 capsule 3     ZOLOFT 100 MG OR TABS 2 TABLET DAILY 60 Tab 1       Allergies   Allergen Reactions     Vicodin [Acetaminophen] GI Disturbance     Stomach discomfort      CBC  Recent Labs   Lab Test 01/03/20  1434   WBC 6.3   RBC 5.00   HGB 15.0   HCT 43.6   MCV 87   MCH 30.0   MCHC 34.4   RDW 12.4          BMP  Recent Labs   Lab Test 01/03/20  1434      POTASSIUM 3.9   LEIDY 8.8   CHLORIDE 110*   CO2 28   BUN 19   CR 1.04   GLC 86     ROS  Constitutional - Denies fevers, weight loss, malaise, lethargy  Neuro - Denies tremors or seizures  Pulmon - Denies SOB, dyspnea, hemoptysis, chronic cough or use of an inhaler  CV - Denies CP, SOB, lower extremity edema, difficulty w/ stairs, has never used NTG  GI - Denies hematemesis, BRBPR, melena, chronic diarrhea or epigastric pain   - Denies hematuria, difficulty voiding, h/o STDs  Hematology - Denies blood clotting disorders, chronic anemias  Dermatology - No melanomas or skin cancers  Rheumatology - No h/o RA  Pysch - Denies depression, bipolar d/o or schizophrenia    Exam:/73 (BP Location: Right arm, Patient Position: Chair, Cuff Size: Adult Regular)   Pulse 77   Temp 97.2  F (36.2  C) (Tympanic)   Resp 16   Ht 1.702 m (5' 7\")   Wt 83.2 kg " (183 lb 6.8 oz)   BMI 28.73 kg/m       General - Alert and Oriented X4, NAD, well nourished  HEENT - Normocephalic, atraumatic, PERRLA, Nose midline, Throat without lesions  Neck - supple, no LAD, Thyroid normal, Carotids without bruits  Lungs - Clear to auscultation bilaterally with good inspiratory effort, no tactile fremitus  CV - Heart RRR, no lift's, thrills, murmurs, rubs, or gallops. Carotid, radial, and femoral pulses 2+ bilaterally  Abdomen - Soft, non-tender, +BS, no hepatosplenomegaly, no palpable masses  Groins - 2+ pulses bilaterally and no LAD, palpable reducible mass on left, palpable cyst strong impulse on right.  Neuro - Full ROM, Strength 5/5 and major muscle groups, sensation intact  Extremities - No cyanosis, clubbing or edema    Assessment and plan: 54-year-old male with bilateral inguinal hernias.  Patient is good candidate for laparoscopic repair.  Risks benefits alternatives and complications were discussed with the patient including the possibility of infection bleeding or hernia recurrence.  Patient understood and wished to proceed. PATIENT IS CLEARED FOR SURGERY.    Han Ang MD     Again, thank you for allowing me to participate in the care of your patient.        Sincerely,        Han Ang MD

## 2020-01-15 NOTE — H&P (VIEW-ONLY)
54-year-old male complaint of left groin mass for the past 7 months.  Patient does occasional heavy lifting and noticed a bulge in his left groin.  Sometimes it is so bad he cannot wear jeans as it is too constricting.  He reports localized discomfort 1-2 out of 10 without radiation.  Aggravated by straining and alleviated by rest.  Patient denies fevers chills nausea and vomiting.    Patient Active Problem List   Diagnosis     Bipolar affective disorder, remission status unspecified (H)     Hypertension, goal below 140/90     Tubular adenoma of colon       History reviewed. No pertinent past medical history.    Past Surgical History:   Procedure Laterality Date     C APPENDECTOMY  1997     C LAMINECTOMY,FACETECTOMY,LUMBAR  2008       Family History   Problem Relation Age of Onset     Prostate Cancer Father      Heart Disease Father         open heart surgery     Cancer Father         lung     C.A.D. Father         triple bypass, first MI age 59     Cancer Sister         leukemia     Diabetes Maternal Grandmother      C.A.D. Maternal Grandmother      Diabetes Maternal Grandfather      C.A.D. Maternal Grandfather      Colon Cancer Maternal Grandfather      Cerebrovascular Disease Paternal Grandfather      C.A.D. Paternal Grandfather      C.A.D. Paternal Grandmother      Colon Cancer Maternal Uncle      Hypertension No family hx of      Breast Cancer No family hx of      Cancer - colorectal No family hx of        Social History     Tobacco Use     Smoking status: Never Smoker     Smokeless tobacco: Never Used   Substance Use Topics     Alcohol use: Yes     Alcohol/week: 0.0 standard drinks     Comment: rarely        History   Drug Use No       Current Outpatient Medications   Medication Sig Dispense Refill     divalproex (DEPAKOTE) 500 MG 24 hr tablet Take 500 mg by mouth daily.       finasteride (PROSCAR) 5 MG tablet Take 1 tablet (5 mg) by mouth daily 90 tablet 3     hydrocortisone (ANUSOL-HC) 2.5 % cream Place  "rectally 2 times daily as needed for hemorrhoids 30 g 2     LamoTRIgine 300 MG TB24 Take  by mouth. One daily       losartan-hydrochlorothiazide (HYZAAR) 100-12.5 MG tablet Take 1 tablet by mouth daily 90 tablet 3     nifedipine 0.2% in white petrolatum 0.2 % OINT ointment Apply topically 2 times daily 30 g 3     QUEtiapine (SEROQUEL) 300 MG tablet Take 600 mg by mouth At Bedtime TID qhs       tamsulosin (FLOMAX) 0.4 MG capsule Take 1 capsule (0.4 mg) by mouth At Bedtime 90 capsule 3     ZOLOFT 100 MG OR TABS 2 TABLET DAILY 60 Tab 1       Allergies   Allergen Reactions     Vicodin [Acetaminophen] GI Disturbance     Stomach discomfort      CBC  Recent Labs   Lab Test 01/03/20  1434   WBC 6.3   RBC 5.00   HGB 15.0   HCT 43.6   MCV 87   MCH 30.0   MCHC 34.4   RDW 12.4          BMP  Recent Labs   Lab Test 01/03/20  1434      POTASSIUM 3.9   LEIDY 8.8   CHLORIDE 110*   CO2 28   BUN 19   CR 1.04   GLC 86     ROS  Constitutional - Denies fevers, weight loss, malaise, lethargy  Neuro - Denies tremors or seizures  Pulmon - Denies SOB, dyspnea, hemoptysis, chronic cough or use of an inhaler  CV - Denies CP, SOB, lower extremity edema, difficulty w/ stairs, has never used NTG  GI - Denies hematemesis, BRBPR, melena, chronic diarrhea or epigastric pain   - Denies hematuria, difficulty voiding, h/o STDs  Hematology - Denies blood clotting disorders, chronic anemias  Dermatology - No melanomas or skin cancers  Rheumatology - No h/o RA  Pysch - Denies depression, bipolar d/o or schizophrenia    Exam:/73 (BP Location: Right arm, Patient Position: Chair, Cuff Size: Adult Regular)   Pulse 77   Temp 97.2  F (36.2  C) (Tympanic)   Resp 16   Ht 1.702 m (5' 7\")   Wt 83.2 kg (183 lb 6.8 oz)   BMI 28.73 kg/m      General - Alert and Oriented X4, NAD, well nourished  HEENT - Normocephalic, atraumatic, PERRLA, Nose midline, Throat without lesions  Neck - supple, no LAD, Thyroid normal, Carotids without bruits  Lungs " - Clear to auscultation bilaterally with good inspiratory effort, no tactile fremitus  CV - Heart RRR, no lift's, thrills, murmurs, rubs, or gallops. Carotid, radial, and femoral pulses 2+ bilaterally  Abdomen - Soft, non-tender, +BS, no hepatosplenomegaly, no palpable masses  Groins - 2+ pulses bilaterally and no LAD, palpable reducible mass on left, palpable cyst strong impulse on right.  Neuro - Full ROM, Strength 5/5 and major muscle groups, sensation intact  Extremities - No cyanosis, clubbing or edema    Assessment and plan: 54-year-old male with bilateral inguinal hernias.  Patient is good candidate for laparoscopic repair.  Risks benefits alternatives and complications were discussed with the patient including the possibility of infection bleeding or hernia recurrence.  Patient understood and wished to proceed. PATIENT IS CLEARED FOR SURGERY.    Han Ang MD

## 2020-01-15 NOTE — NURSING NOTE
"Chief Complaint   Patient presents with     Consult     hernia - left side and down in the groin area - going on x7 months       Initial /73 (BP Location: Right arm, Patient Position: Chair, Cuff Size: Adult Regular)   Pulse 77   Temp 97.2  F (36.2  C) (Tympanic)   Resp 16   Ht 1.702 m (5' 7\")   Wt 83.2 kg (183 lb 6.8 oz)   BMI 28.73 kg/m   Estimated body mass index is 28.73 kg/m  as calculated from the following:    Height as of this encounter: 1.702 m (5' 7\").    Weight as of this encounter: 83.2 kg (183 lb 6.8 oz).  Medications and allergies reviewed.    Leah WELLINGTON CMA ([facility])    "

## 2020-01-30 ENCOUNTER — ANESTHESIA EVENT (OUTPATIENT)
Dept: SURGERY | Facility: CLINIC | Age: 55
End: 2020-01-30
Payer: COMMERCIAL

## 2020-02-03 ENCOUNTER — TELEPHONE (OUTPATIENT)
Dept: SURGERY | Facility: CLINIC | Age: 55
End: 2020-02-03

## 2020-02-03 ENCOUNTER — TELEPHONE (OUTPATIENT)
Dept: UROLOGY | Facility: CLINIC | Age: 55
End: 2020-02-03

## 2020-02-03 NOTE — TELEPHONE ENCOUNTER
Reason for Call:  Other call back and questions     Detailed comments: Patient wants to know if his urine is supposed to continue to be discolored? Patient also wants to know if he should be seen sooner than a month for his postop?    Phone Number Patient can be reached at: Home number on file [phone] (home)    Best Time: any    Can we leave a detailed message on this number? YES    Call taken on 2/3/2020 at 8:06 AM by Jerome Mckeon

## 2020-02-03 NOTE — TELEPHONE ENCOUNTER
Reason for Call:  Other surgery      Detailed comments: Pt wants to reschedule surgery, please call today, surgery tomorrow    Phone Number Patient can be reached at: Home number on file [phone] (home)    Best Time: today    Can we leave a detailed message on this number? YES    Call taken on 2/3/2020 at 8:11 AM by Padmaja Stoddard

## 2020-02-03 NOTE — TELEPHONE ENCOUNTER
Called patient and rescheduled surgery for him for February 11, 2020 at noon with arrival time at 11:00 am.     Leah WELLINGTON CMA ([facility])

## 2020-02-03 NOTE — TELEPHONE ENCOUNTER
Called and spoke to patient.   Patient states that his urine is red/orange color.   Patient is status post-TURP 1/29/2020  Reassured patient that blood and pieces of tissue is normal post-TURP, for 6-8 weeks per Dr. Solis.   Post-op follow up is appropriate 1 month per Dr. Solis.   Patient reassured.   Genet Reyes RN

## 2020-02-04 ENCOUNTER — ANESTHESIA (OUTPATIENT)
Dept: SURGERY | Facility: CLINIC | Age: 55
End: 2020-02-04
Payer: COMMERCIAL

## 2020-02-11 ENCOUNTER — HOSPITAL ENCOUNTER (OUTPATIENT)
Facility: CLINIC | Age: 55
Discharge: HOME | End: 2020-02-11
Attending: SURGERY | Admitting: SURGERY
Payer: COMMERCIAL

## 2020-02-11 VITALS
OXYGEN SATURATION: 95 % | SYSTOLIC BLOOD PRESSURE: 138 MMHG | WEIGHT: 183 LBS | HEART RATE: 87 BPM | HEIGHT: 67 IN | BODY MASS INDEX: 28.72 KG/M2 | TEMPERATURE: 98 F | DIASTOLIC BLOOD PRESSURE: 90 MMHG | RESPIRATION RATE: 16 BRPM

## 2020-02-11 DIAGNOSIS — G89.18 POSTOPERATIVE PAIN: Primary | ICD-10-CM

## 2020-02-11 DIAGNOSIS — K40.90 LEFT INGUINAL HERNIA: ICD-10-CM

## 2020-02-11 PROCEDURE — 25800030 ZZH RX IP 258 OP 636: Performed by: NURSE ANESTHETIST, CERTIFIED REGISTERED

## 2020-02-11 PROCEDURE — 36000058 ZZH SURGERY LEVEL 3 EA 15 ADDTL MIN: Performed by: SURGERY

## 2020-02-11 PROCEDURE — 71000027 ZZH RECOVERY PHASE 2 EACH 15 MINS: Performed by: SURGERY

## 2020-02-11 PROCEDURE — 37000009 ZZH ANESTHESIA TECHNICAL FEE, EACH ADDTL 15 MIN: Performed by: SURGERY

## 2020-02-11 PROCEDURE — 27210794 ZZH OR GENERAL SUPPLY STERILE: Performed by: SURGERY

## 2020-02-11 PROCEDURE — C1781 MESH (IMPLANTABLE): HCPCS | Performed by: SURGERY

## 2020-02-11 PROCEDURE — 25000125 ZZHC RX 250: Performed by: SURGERY

## 2020-02-11 PROCEDURE — 25000125 ZZHC RX 250: Performed by: NURSE ANESTHETIST, CERTIFIED REGISTERED

## 2020-02-11 PROCEDURE — 36000056 ZZH SURGERY LEVEL 3 1ST 30 MIN: Performed by: SURGERY

## 2020-02-11 PROCEDURE — 25000132 ZZH RX MED GY IP 250 OP 250 PS 637: Performed by: SURGERY

## 2020-02-11 PROCEDURE — 27110028 ZZH OR GENERAL SUPPLY NON-STERILE: Performed by: SURGERY

## 2020-02-11 PROCEDURE — 71000012 ZZH RECOVERY PHASE 1 LEVEL 1 FIRST HR: Performed by: SURGERY

## 2020-02-11 PROCEDURE — 25000128 H RX IP 250 OP 636: Performed by: NURSE ANESTHETIST, CERTIFIED REGISTERED

## 2020-02-11 PROCEDURE — 49650 LAP ING HERNIA REPAIR INIT: CPT | Mod: LT | Performed by: SURGERY

## 2020-02-11 PROCEDURE — 25000128 H RX IP 250 OP 636: Performed by: SURGERY

## 2020-02-11 PROCEDURE — 40000306 ZZH STATISTIC PRE PROC ASSESS II: Performed by: SURGERY

## 2020-02-11 PROCEDURE — 25000566 ZZH SEVOFLURANE, EA 15 MIN: Performed by: SURGERY

## 2020-02-11 PROCEDURE — 49650 LAP ING HERNIA REPAIR INIT: CPT | Mod: LT | Performed by: PHYSICIAN ASSISTANT

## 2020-02-11 PROCEDURE — 37000008 ZZH ANESTHESIA TECHNICAL FEE, 1ST 30 MIN: Performed by: SURGERY

## 2020-02-11 DEVICE — 3DMAX MESH, 10.8 CM X 16.0 CM (4.3" X 6.3"), LEFT, LARGE
Type: IMPLANTABLE DEVICE | Site: INGUINAL | Status: FUNCTIONAL
Brand: 3DMAX

## 2020-02-11 RX ORDER — FENTANYL CITRATE 50 UG/ML
25-50 INJECTION, SOLUTION INTRAMUSCULAR; INTRAVENOUS
Status: DISCONTINUED | OUTPATIENT
Start: 2020-02-11 | End: 2020-02-11 | Stop reason: HOSPADM

## 2020-02-11 RX ORDER — MEPERIDINE HYDROCHLORIDE 25 MG/ML
12.5 INJECTION INTRAMUSCULAR; INTRAVENOUS; SUBCUTANEOUS
Status: DISCONTINUED | OUTPATIENT
Start: 2020-02-11 | End: 2020-02-11 | Stop reason: HOSPADM

## 2020-02-11 RX ORDER — CEFAZOLIN SODIUM 2 G/100ML
2 INJECTION, SOLUTION INTRAVENOUS
Status: COMPLETED | OUTPATIENT
Start: 2020-02-11 | End: 2020-02-11

## 2020-02-11 RX ORDER — HYDROMORPHONE HYDROCHLORIDE 1 MG/ML
.3-.5 INJECTION, SOLUTION INTRAMUSCULAR; INTRAVENOUS; SUBCUTANEOUS EVERY 10 MIN PRN
Status: DISCONTINUED | OUTPATIENT
Start: 2020-02-11 | End: 2020-02-11 | Stop reason: HOSPADM

## 2020-02-11 RX ORDER — ONDANSETRON 4 MG/1
4 TABLET, ORALLY DISINTEGRATING ORAL EVERY 30 MIN PRN
Status: DISCONTINUED | OUTPATIENT
Start: 2020-02-11 | End: 2020-02-11 | Stop reason: HOSPADM

## 2020-02-11 RX ORDER — CEFAZOLIN SODIUM 1 G/50ML
1 INJECTION, SOLUTION INTRAVENOUS SEE ADMIN INSTRUCTIONS
Status: DISCONTINUED | OUTPATIENT
Start: 2020-02-11 | End: 2020-02-11 | Stop reason: HOSPADM

## 2020-02-11 RX ORDER — OXYCODONE HYDROCHLORIDE 5 MG/1
5 TABLET ORAL EVERY 4 HOURS PRN
Status: DISCONTINUED | OUTPATIENT
Start: 2020-02-11 | End: 2020-02-11 | Stop reason: HOSPADM

## 2020-02-11 RX ORDER — LIDOCAINE 40 MG/G
CREAM TOPICAL
Status: DISCONTINUED | OUTPATIENT
Start: 2020-02-11 | End: 2020-02-11 | Stop reason: HOSPADM

## 2020-02-11 RX ORDER — ONDANSETRON 2 MG/ML
INJECTION INTRAMUSCULAR; INTRAVENOUS PRN
Status: DISCONTINUED | OUTPATIENT
Start: 2020-02-11 | End: 2020-02-11

## 2020-02-11 RX ORDER — OXYCODONE HYDROCHLORIDE 5 MG/1
5 TABLET ORAL EVERY 6 HOURS PRN
Qty: 20 TABLET | Refills: 0 | Status: SHIPPED | OUTPATIENT
Start: 2020-02-11 | End: 2020-02-16

## 2020-02-11 RX ORDER — SODIUM CHLORIDE, SODIUM LACTATE, POTASSIUM CHLORIDE, CALCIUM CHLORIDE 600; 310; 30; 20 MG/100ML; MG/100ML; MG/100ML; MG/100ML
INJECTION, SOLUTION INTRAVENOUS CONTINUOUS
Status: DISCONTINUED | OUTPATIENT
Start: 2020-02-11 | End: 2020-02-11 | Stop reason: HOSPADM

## 2020-02-11 RX ORDER — PROPOFOL 10 MG/ML
INJECTION, EMULSION INTRAVENOUS PRN
Status: DISCONTINUED | OUTPATIENT
Start: 2020-02-11 | End: 2020-02-11

## 2020-02-11 RX ORDER — SENNOSIDES A AND B 8.6 MG/1
1 TABLET, FILM COATED ORAL DAILY
COMMUNITY

## 2020-02-11 RX ORDER — BUPIVACAINE HYDROCHLORIDE AND EPINEPHRINE 2.5; 5 MG/ML; UG/ML
INJECTION, SOLUTION INFILTRATION; PERINEURAL PRN
Status: DISCONTINUED | OUTPATIENT
Start: 2020-02-11 | End: 2020-02-11 | Stop reason: HOSPADM

## 2020-02-11 RX ORDER — NALOXONE HYDROCHLORIDE 0.4 MG/ML
.1-.4 INJECTION, SOLUTION INTRAMUSCULAR; INTRAVENOUS; SUBCUTANEOUS
Status: DISCONTINUED | OUTPATIENT
Start: 2020-02-11 | End: 2020-02-11 | Stop reason: HOSPADM

## 2020-02-11 RX ORDER — FINASTERIDE 5 MG/1
5 TABLET, FILM COATED ORAL
Status: ON HOLD | COMMUNITY
End: 2020-02-11

## 2020-02-11 RX ORDER — LIDOCAINE HYDROCHLORIDE 10 MG/ML
INJECTION, SOLUTION INFILTRATION; PERINEURAL PRN
Status: DISCONTINUED | OUTPATIENT
Start: 2020-02-11 | End: 2020-02-11

## 2020-02-11 RX ORDER — FENTANYL CITRATE 50 UG/ML
INJECTION, SOLUTION INTRAMUSCULAR; INTRAVENOUS PRN
Status: DISCONTINUED | OUTPATIENT
Start: 2020-02-11 | End: 2020-02-11

## 2020-02-11 RX ORDER — ONDANSETRON 2 MG/ML
4 INJECTION INTRAMUSCULAR; INTRAVENOUS EVERY 30 MIN PRN
Status: DISCONTINUED | OUTPATIENT
Start: 2020-02-11 | End: 2020-02-11 | Stop reason: HOSPADM

## 2020-02-11 RX ORDER — DEXAMETHASONE SODIUM PHOSPHATE 4 MG/ML
INJECTION, SOLUTION INTRA-ARTICULAR; INTRALESIONAL; INTRAMUSCULAR; INTRAVENOUS; SOFT TISSUE PRN
Status: DISCONTINUED | OUTPATIENT
Start: 2020-02-11 | End: 2020-02-11

## 2020-02-11 RX ORDER — KETOROLAC TROMETHAMINE 30 MG/ML
30 INJECTION, SOLUTION INTRAMUSCULAR; INTRAVENOUS EVERY 6 HOURS PRN
Status: DISCONTINUED | OUTPATIENT
Start: 2020-02-11 | End: 2020-02-11 | Stop reason: HOSPADM

## 2020-02-11 RX ADMIN — FENTANYL CITRATE 100 MCG: 50 INJECTION, SOLUTION INTRAMUSCULAR; INTRAVENOUS at 13:22

## 2020-02-11 RX ADMIN — DEXAMETHASONE SODIUM PHOSPHATE 4 MG: 4 INJECTION, SOLUTION INTRA-ARTICULAR; INTRALESIONAL; INTRAMUSCULAR; INTRAVENOUS; SOFT TISSUE at 13:02

## 2020-02-11 RX ADMIN — FENTANYL CITRATE 100 MCG: 50 INJECTION, SOLUTION INTRAMUSCULAR; INTRAVENOUS at 13:02

## 2020-02-11 RX ADMIN — CEFAZOLIN SODIUM 2 G: 2 INJECTION, SOLUTION INTRAVENOUS at 12:58

## 2020-02-11 RX ADMIN — PROPOFOL 200 MG: 10 INJECTION, EMULSION INTRAVENOUS at 13:02

## 2020-02-11 RX ADMIN — HYDROMORPHONE HYDROCHLORIDE 0.5 MG: 1 INJECTION, SOLUTION INTRAMUSCULAR; INTRAVENOUS; SUBCUTANEOUS at 16:05

## 2020-02-11 RX ADMIN — SODIUM CHLORIDE, POTASSIUM CHLORIDE, SODIUM LACTATE AND CALCIUM CHLORIDE: 600; 310; 30; 20 INJECTION, SOLUTION INTRAVENOUS at 10:52

## 2020-02-11 RX ADMIN — SUGAMMADEX 200 MG: 100 INJECTION, SOLUTION INTRAVENOUS at 13:57

## 2020-02-11 RX ADMIN — FENTANYL CITRATE 50 MCG: 50 INJECTION, SOLUTION INTRAMUSCULAR; INTRAVENOUS at 14:34

## 2020-02-11 RX ADMIN — ROCURONIUM BROMIDE 50 MG: 10 INJECTION INTRAVENOUS at 13:02

## 2020-02-11 RX ADMIN — MIDAZOLAM 2 MG: 1 INJECTION INTRAMUSCULAR; INTRAVENOUS at 12:59

## 2020-02-11 RX ADMIN — LIDOCAINE HYDROCHLORIDE 40 MG: 10 INJECTION, SOLUTION INFILTRATION; PERINEURAL at 13:02

## 2020-02-11 RX ADMIN — KETOROLAC TROMETHAMINE 30 MG: 30 INJECTION, SOLUTION INTRAMUSCULAR at 16:08

## 2020-02-11 RX ADMIN — FENTANYL CITRATE 50 MCG: 50 INJECTION, SOLUTION INTRAMUSCULAR; INTRAVENOUS at 14:46

## 2020-02-11 RX ADMIN — FENTANYL CITRATE 100 MCG: 50 INJECTION, SOLUTION INTRAMUSCULAR; INTRAVENOUS at 13:49

## 2020-02-11 RX ADMIN — ONDANSETRON 4 MG: 2 INJECTION INTRAMUSCULAR; INTRAVENOUS at 13:02

## 2020-02-11 RX ADMIN — OXYCODONE HYDROCHLORIDE 5 MG: 5 TABLET ORAL at 14:32

## 2020-02-11 RX ADMIN — LIDOCAINE HYDROCHLORIDE 0.2 ML: 10 INJECTION, SOLUTION EPIDURAL; INFILTRATION; INTRACAUDAL; PERINEURAL at 10:52

## 2020-02-11 RX ADMIN — ROCURONIUM BROMIDE 50 MG: 10 INJECTION INTRAVENOUS at 13:36

## 2020-02-11 RX ADMIN — SODIUM CHLORIDE, POTASSIUM CHLORIDE, SODIUM LACTATE AND CALCIUM CHLORIDE: 600; 310; 30; 20 INJECTION, SOLUTION INTRAVENOUS at 13:58

## 2020-02-11 RX ADMIN — FENTANYL CITRATE 50 MCG: 50 INJECTION, SOLUTION INTRAMUSCULAR; INTRAVENOUS at 13:16

## 2020-02-11 ASSESSMENT — MIFFLIN-ST. JEOR: SCORE: 1623.71

## 2020-02-11 NOTE — ANESTHESIA CARE TRANSFER NOTE
Patient: Edis Burroughs    Procedure(s):  HERNIORRHAPHY, INGUINAL, left LAPAROSCOPIC    Diagnosis: Left inguinal hernia [K40.90]  Diagnosis Additional Information: No value filed.    Anesthesia Type:   General, ETT     Note:  Airway :Face Mask  Patient transferred to:PACU  Handoff Report: Identifed the Patient, Identified the Reponsible Provider, Reviewed the pertinent medical history, Discussed the surgical course, Reviewed Intra-OP anesthesia mangement and issues during anesthesia, Set expectations for post-procedure period and Allowed opportunity for questions and acknowledgement of understanding      Vitals: (Last set prior to Anesthesia Care Transfer)    CRNA VITALS  2/11/2020 1345 - 2/11/2020 1426      2/11/2020             Pulse:  109    SpO2:  93 %                Electronically Signed By: Daniel Toney CRNA, APRN CRNA  February 11, 2020  2:26 PM

## 2020-02-11 NOTE — DISCHARGE INSTRUCTIONS
Same Day Surgery Discharge Instructions  Special Precautions After Surgery - Adult    1. It is not unusual to feel lightheaded or faint, up to 24 hours after surgery or while taking pain medication.  If you have these symptoms; sit for a few minutes before standing and have someone assist you when getting up.  2. You should rest and relax for the next 24 hours and must have someone stay with you for at least 24 hours after your discharge.  3. DO NOT DRIVE any vehicle or operate mechanical equipment for 24 hours following the end of your surgery.  DO NOT DRIVE while taking narcotic pain medications that have been prescribed by your physician.  If you had a limb operated on, you must be able to use it fully to drive.  4. DO NOT drink alcoholic beverages for 24 hours following surgery or while taking prescription pain medication.  5. Drink clear liquids (apple juice, ginger ale, broth, 7-Up, etc.).  Progress to your regular diet as you feel able.  6. Any questions call your physician and do not make important decisions for 24 hours.             __________________________________________________________________________________________________________________________________  IMPORTANT NUMBERS:    [facility] [place] Main Number:  [phone], [phone]  Pharmacy:  [phone]  Same Day Surgery:  [phone], Monday - Friday until 8:30 p.m.  Urgent Care:  [phone]  Emergency Room:  [phone]                                                                                   [facility]:  [phone] --  Dr. Ang            DISCHARGE INSTRUCTIONS     1. You may resume your regular diet when you feel you are ready    2. No heavy lifting (>30lbs)  for 1 month.    3. You will have some discomfort at the incision sites. This is expected. This should improve over the next 2-3 days. Ice and pain medication will help with this pain. Use prescribed pain medication as instructed.     4. Some bruising  and mild swelling is normal after surgery. The area below and around the incision(s) may be hard and elevated. This is part of the normal healing process. This will resolve slowly over the next several months.     5. Your wounds are covered with glue. The glue is water tight and so you can shower or bathe immediately following surgery.     6. Use the following medications (in addition to your normal meds) as shown:      Tylenol (acetaminophen) 500 mg every 6 hours as needed for pain.    Do not take more than 1000 mg of Tylenol every 6 hours -OR- 4g in a day    Motrin (ibuprophen) 600 mg every 6 hours as needed for pain. Take with food.     8. Notify Clinic at [phone] if:     Your discomfort is not relieved by your pain medication     You have signs of infection such as temperature above 100.4 degrees orally,  chills, or increasing daily discomfort.     Incision site is becoming more red and/or there is purulent drainage.      9. Follow up with Dr Ang in 1-2 weeks.    10.  Due to regulations, I am only allowed prescribe to 3 days of postoperative narcotic pain medication.  Should you require a refill, please call or text me at [phone].

## 2020-02-11 NOTE — ANESTHESIA PREPROCEDURE EVALUATION
Anesthesia Pre-Procedure Evaluation    Patient: Edis Burroughs   MRN: [id number] : 1965          Preoperative Diagnosis: Left inguinal hernia [K40.90]    Procedure(s):  HERNIORRHAPHY, INGUINAL, BILATERAL, LAPAROSCOPIC    History reviewed. No pertinent past medical history.  Past Surgical History:   Procedure Laterality Date     C APPENDECTOMY       C LAMINECTOMY,FACETECTOMY,LUMBAR  2008     GENITOURINARY SURGERY         Anesthesia Evaluation     . Pt has had prior anesthetic. Type: General    No history of anesthetic complications          ROS/MED HX    ENT/Pulmonary:  - neg pulmonary ROS     Neurologic:  - neg neurologic ROS     Cardiovascular:     (+) hypertension----. : . . . :. .       METS/Exercise Tolerance:  >4 METS   Hematologic:  - neg hematologic  ROS       Musculoskeletal:  - neg musculoskeletal ROS       GI/Hepatic:  - neg GI/hepatic ROS       Renal/Genitourinary:  - ROS Renal section negative       Endo:  - neg endo ROS       Psychiatric:     (+) psychiatric history bipolar      Infectious Disease:  - neg infectious disease ROS       Malignancy:      - no malignancy   Other:    - neg other ROS                      Physical Exam  Normal systems: cardiovascular, pulmonary and dental    Airway   Mallampati: II  TM distance: >3 FB  Neck ROM: full    Dental     Cardiovascular   Rhythm and rate: regular and normal      Pulmonary    breath sounds clear to auscultation            Lab Results   Component Value Date    WBC 6.3 2020    HGB 15.0 2020    HCT 43.6 2020     2020    CRP 6.2 2019    SED 6 2019     2020    POTASSIUM 3.9 2020    CHLORIDE 110 (H) 2020    CO2 28 2020    BUN 19 2020    CR 1.04 2020    GLC 86 2020    LEIDY 8.8 2020    ALT 41 10/03/2019    AST 19 10/03/2019    TSH 1.22 02/15/2017    T4 1.00 2010    T3 98 2010       Preop Vitals  BP Readings from Last 3 Encounters:  "  02/11/20 121/84   01/15/20 125/73   01/07/20 135/83    Pulse Readings from Last 3 Encounters:   02/11/20 96   01/15/20 77   01/07/20 88      Resp Readings from Last 3 Encounters:   02/11/20 16   01/15/20 16   01/03/20 16    SpO2 Readings from Last 3 Encounters:   02/11/20 97%   01/07/20 98%   06/25/19 98%      Temp Readings from Last 1 Encounters:   02/11/20 36.7  C (98  F) (Oral)    Ht Readings from Last 1 Encounters:   02/11/20 1.702 m (5' 7\")      Wt Readings from Last 1 Encounters:   02/11/20 83 kg (183 lb)    Estimated body mass index is 28.66 kg/m  as calculated from the following:    Height as of this encounter: 1.702 m (5' 7\").    Weight as of this encounter: 83 kg (183 lb).       Anesthesia Plan      History & Physical Review  History and physical reviewed and following examination; no interval change.    ASA Status:  2 .    NPO Status:  > 8 hours    Plan for General and ETT with Intravenous and Propofol induction. Maintenance will be Balanced.    PONV prophylaxis:  Ondansetron (or other 5HT-3) and Dexamethasone or Solumedrol  Additional equipment: Videolaryngoscope      Postoperative Care  Postoperative pain management:  IV analgesics.      Consents  Anesthetic plan, risks, benefits and alternatives discussed with:  Patient..                 Daniel Toney CRNA, APRN CRNA  "

## 2020-02-11 NOTE — OP NOTE
Preop diagnosis: Bilateral inguinal hernias    Postop diagnosis: Left inguinal hernia    Procedure laparoscopic left inguinal hernia repair    Surgeon: Sav    Assistant surgeon: Kaz Quiroz PA-C (needed for expertise in camera operation retraction hemostasis wound closure and suctioning)    Anesthesia: General endotracheal Hiltner CRNA    Procedure: Patient's lower abdomen was clipped of extraneous hair and cleaned and draped in sterile manner.  1/4% Marcaine with epinephrine was used to anesthetize all port sites.  Small subumbilical curvilinear incision made and subcutaneous tissues dissected to fascia.  Anterior fascia of right rectus muscle open revealing muscle beneath.  This was pulled laterally revealing posterior fascia.  Dissecting balloon trocar inserted in place.  Preperitoneal space and insufflated under direct vision.  This was then removed and 10 mm trocar placed into preperitoneal space.  Initially visible was a fibrous connective band between the dome of the bladder and the umbilicus making visualization of midline extremely difficult.  Some dissection was performed clearing off the right pelvic sidewall but no hernia could be detected.  Attention was then turned to the left side.  Since we had gone and behind the right rectus muscle we could not see above the fibrous band (suspected urachus).  As result a small defect was made in the left rectus muscle sheath and this was pulled laterally and the 10 mm trocar was placed in in the retroperitoneum behind the left rectus sheath.  This allowed access to the left groin and inguinal area.  Since this was the patient's primary side we dissected out here and removed a large hernia sac from the inguinal canal.  This was swept to the bottom of the operative field.  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  After reduction of the hernia sac a piece of Bard 3D max mesh was then used for repair.  It was tacked medially to pubic tubercle and anteriorly across the posterior rectus sheath.  The inferior margin mesh tucked easily under the parietal peritoneum.  Finally all trochars were removed under direct vision and the air allowed to desufflate.  The fascial defect of the subumbilical port was closed using an 0 Vicryl suture in a figure-of-eight fashion.  This was done on both sides.  All wounds were irrigated with normal saline and the skin closed using 4-0 Vicryl running subcuticular stitches and dressed with Dermabond.    Estimated blood loss: 10 mL    IV fluid: 1000 mL

## 2020-02-11 NOTE — ANESTHESIA POSTPROCEDURE EVALUATION
Patient: Edis Burroughs    Procedure(s):  HERNIORRHAPHY, INGUINAL, left LAPAROSCOPIC    Diagnosis:Left inguinal hernia [K40.90]  Diagnosis Additional Information: No value filed.    Anesthesia Type:  General, ETT    Note:  Anesthesia Post Evaluation    Patient location during evaluation: Phase 2 and Bedside  Patient participation: Able to fully participate in evaluation  Level of consciousness: awake and alert  Pain management: adequate  Airway patency: patent  Cardiovascular status: acceptable  Respiratory status: acceptable  Hydration status: acceptable  PONV: none     Anesthetic complications: None          Last vitals:  Vitals:    02/11/20 1445 02/11/20 1500 02/11/20 1516   BP: (!) 140/89 (!) 142/85 (!) 145/90   Pulse: 87 87    Resp: 13 11    Temp:      SpO2: 97% 97% 96%         Electronically Signed By: Daniel Toney CRNA, APRN CRNA  February 11, 2020  3:49 PM

## 2020-02-11 NOTE — PROGRESS NOTES
Normally patient will receive 100 mg morphine equivalents of postoperative pain control on his first prescription.  Unfortunately, this patient has an allergy to acetaminophen and the surgery was particularly difficult.  Therefore I am writing a prescription for 20 tablets of 5 mg oxycodone.    Han Ang MD

## 2020-02-14 ENCOUNTER — OFFICE VISIT (OUTPATIENT)
Dept: SURGERY | Facility: CLINIC | Age: 55
End: 2020-02-14
Payer: COMMERCIAL

## 2020-02-14 VITALS
BODY MASS INDEX: 28.72 KG/M2 | WEIGHT: 182.98 LBS | HEART RATE: 96 BPM | SYSTOLIC BLOOD PRESSURE: 109 MMHG | TEMPERATURE: 98.5 F | HEIGHT: 67 IN | DIASTOLIC BLOOD PRESSURE: 79 MMHG

## 2020-02-14 DIAGNOSIS — G89.18 POSTOPERATIVE PAIN: ICD-10-CM

## 2020-02-14 DIAGNOSIS — Z09 POSTOP CHECK: Primary | ICD-10-CM

## 2020-02-14 PROCEDURE — 99024 POSTOP FOLLOW-UP VISIT: CPT | Performed by: SURGERY

## 2020-02-14 RX ORDER — OXYCODONE HYDROCHLORIDE 5 MG/1
5 TABLET ORAL EVERY 6 HOURS PRN
Qty: 12 TABLET | Refills: 0 | Status: SHIPPED | OUTPATIENT
Start: 2020-02-14 | End: 2020-02-17

## 2020-02-14 ASSESSMENT — MIFFLIN-ST. JEOR: SCORE: 1623.63

## 2020-02-14 NOTE — LETTER
"    2/14/2020         RE: Edis Burroughs  [address]   [place] [address]        Dear Colleague,    Thank you for referring your patient, Edis Bruroughs, to the [facility]. Please see a copy of my visit note below.    No complaints.  Pain controlled with oral pain meds.    /79 (BP Location: Right arm, Patient Position: Sitting, Cuff Size: Adult Large)   Pulse 96   Temp 98.5  F (36.9  C) (Tympanic)   Ht 1.702 m (5' 7\")   Wt 83 kg (182 lb 15.7 oz)   BMI 28.66 kg/m       Exam  [id number]  CTAB  RRR  S&NTND+BS, wounds - cdi s erythema  No CCE    A/P s/p lap LIH healing well. RTC prn.    Hna Ang MD     Again, thank you for allowing me to participate in the care of your patient.        Sincerely,        Han Ang MD    "

## 2020-02-14 NOTE — NURSING NOTE
"Initial /79 (BP Location: Right arm, Patient Position: Sitting, Cuff Size: Adult Large)   Pulse 96   Temp 98.5  F (36.9  C) (Tympanic)   Ht 1.702 m (5' 7\")   Wt 83 kg (182 lb 15.7 oz)   BMI 28.66 kg/m   Estimated body mass index is 28.66 kg/m  as calculated from the following:    Height as of this encounter: 1.702 m (5' 7\").    Weight as of this encounter: 83 kg (182 lb 15.7 oz). .    Lizette Wagner MA    "

## 2020-02-14 NOTE — PROGRESS NOTES
"No complaints.  Pain controlled with oral pain meds.    /79 (BP Location: Right arm, Patient Position: Sitting, Cuff Size: Adult Large)   Pulse 96   Temp 98.5  F (36.9  C) (Tympanic)   Ht 1.702 m (5' 7\")   Wt 83 kg (182 lb 15.7 oz)   BMI 28.66 kg/m      Exam  [id number]  CTAB  RRR  S&NTND+BS, wounds - cdi s erythema  No CCE    A/P s/p lap LIH healing well. RTC prn.    Han Ang MD   "

## 2020-02-17 ENCOUNTER — HEALTH MAINTENANCE LETTER (OUTPATIENT)
Age: 55
End: 2020-02-17

## 2020-02-20 ENCOUNTER — OFFICE VISIT (OUTPATIENT)
Dept: FAMILY MEDICINE | Facility: CLINIC | Age: 55
End: 2020-02-20
Payer: COMMERCIAL

## 2020-02-20 VITALS
OXYGEN SATURATION: 98 % | TEMPERATURE: 98.2 F | HEART RATE: 92 BPM | BODY MASS INDEX: 29.1 KG/M2 | WEIGHT: 185.8 LBS | SYSTOLIC BLOOD PRESSURE: 126 MMHG | DIASTOLIC BLOOD PRESSURE: 82 MMHG

## 2020-02-20 DIAGNOSIS — R05.9 COUGH: ICD-10-CM

## 2020-02-20 DIAGNOSIS — J06.9 VIRAL URI: Primary | ICD-10-CM

## 2020-02-20 PROCEDURE — 99213 OFFICE O/P EST LOW 20 MIN: CPT | Performed by: PHYSICIAN ASSISTANT

## 2020-02-20 ASSESSMENT — ENCOUNTER SYMPTOMS
RHINORRHEA: 1
FATIGUE: 1
CONSTIPATION: 0
FEVER: 0
CHEST TIGHTNESS: 1
SHORTNESS OF BREATH: 0
VOMITING: 0
NAUSEA: 0
COUGH: 1
NERVOUS/ANXIOUS: 0
DIARRHEA: 0

## 2020-02-20 NOTE — PROGRESS NOTES
Subjective     Edis Burroughs is a 54 year old male who presents to clinic today for the following health issues:    Patient notes cough ongoing for 1 week with associated nasal congestion, fatigue, chest congestion. Patient denies ear pain, fever, chest pain, rash, MATTHEWS, hemoptysis. Symptoms treated with Theraflu, Nyquil and Ibuprofen.   He admits to recent prostate surgery on January 29th and hernia surgery on February 11th. Patient notes son and grandson have had URI symptoms.     HPI   ENT Symptoms             Symptoms: cc Present Absent Comment   Fever/Chills   x    Fatigue   x    Muscle Aches  x  Muscle aches off/on x4 days   Eye Irritation   x    Sneezing   x    Nasal Anton/Drg  x  Congestion and drainage x4-5 days   Sinus Pressure/Pain x   Pain and pressure x4-5 days   Loss of smell   x    Dental pain   x    Sore Throat   x    Swollen Glands   x    Ear Pain/Fullness   x    Cough  x  Cough x4 days, getting worse   Wheeze  x     Chest Pain   x    Shortness of breath  x  With activity   Rash   x    Other   x      Symptom duration:  4-5 days   Symptom severity:  moderate   Treatments tried:  theraflu, nyquil, ibuprofen   Contacts:  son had cold symptoms        Patient Active Problem List   Diagnosis     Bipolar affective disorder, remission status unspecified (H)     Hypertension, goal below 140/90     Tubular adenoma of colon     Left inguinal hernia     Past Surgical History:   Procedure Laterality Date     C APPENDECTOMY  1997     C LAMINECTOMY,FACETECTOMY,LUMBAR  2008     GENITOURINARY SURGERY       LAPAROSCOPIC HERNIORRHAPHY INGUINAL BILATERAL Bilateral 2/11/2020    Procedure: HERNIORRHAPHY, INGUINAL, left LAPAROSCOPIC;  Surgeon: Han Ang MD;  Location: [facility] OR       Social History     Tobacco Use     Smoking status: Never Smoker     Smokeless tobacco: Never Used   Substance Use Topics     Alcohol use: Yes     Alcohol/week: 0.0 standard drinks     Comment: rarely     Family History   Problem Relation  Age of Onset     Prostate Cancer Father      Heart Disease Father         open heart surgery     Cancer Father         lung     C.A.D. Father         triple bypass, first MI age 59     Cancer Sister         leukemia     Diabetes Maternal Grandmother      C.A.D. Maternal Grandmother      Diabetes Maternal Grandfather      C.A.D. Maternal Grandfather      Colon Cancer Maternal Grandfather      Cerebrovascular Disease Paternal Grandfather      C.A.D. Paternal Grandfather      C.A.D. Paternal Grandmother      Colon Cancer Maternal Uncle      Hypertension No family hx of      Breast Cancer No family hx of      Cancer - colorectal No family hx of          Current Outpatient Medications   Medication Sig Dispense Refill     divalproex (DEPAKOTE) 500 MG 24 hr tablet Take 500 mg by mouth daily.       finasteride (PROSCAR) 5 MG tablet Take 1 tablet (5 mg) by mouth daily 90 tablet 3     LamoTRIgine 300 MG TB24 Take  by mouth. One daily       losartan-hydrochlorothiazide (HYZAAR) 100-12.5 MG tablet Take 1 tablet by mouth daily 90 tablet 3     QUEtiapine (SEROQUEL) 300 MG tablet Take 600 mg by mouth At Bedtime TID qhs       senna (SENOKOT) 8.6 MG tablet Take 1 tablet by mouth daily       ZOLOFT 100 MG OR TABS 2 TABLET DAILY 60 Tab 1     hydrocortisone (ANUSOL-HC) 2.5 % cream Place rectally 2 times daily as needed for hemorrhoids (Patient not taking: Reported on 2/20/2020) 30 g 2     Allergies   Allergen Reactions     Vicodin [Acetaminophen] Itching, Rash and GI Disturbance     Stomach discomfort       Reviewed and updated as needed this visit by Provider  Tobacco  Allergies  Meds  Problems  Med Hx  Surg Hx  Fam Hx         Review of Systems   Constitutional: Positive for fatigue. Negative for fever.   HENT: Positive for congestion and rhinorrhea. Negative for ear pain.    Respiratory: Positive for cough and chest tightness (with cough). Negative for shortness of breath.    Cardiovascular: Negative for chest pain.    Gastrointestinal: Negative for constipation, diarrhea, nausea and vomiting.   Skin: Negative for rash.   Psychiatric/Behavioral: The patient is not nervous/anxious.             Objective    /82 (BP Location: Left arm, Cuff Size: Adult Large)   Pulse 92   Temp 98.2  F (36.8  C) (Tympanic)   Wt 84.3 kg (185 lb 12.8 oz)   SpO2 98%   BMI 29.10 kg/m    Body mass index is 29.1 kg/m .  Physical Exam  Vitals signs and nursing note reviewed.   Constitutional:       General: He is not in acute distress.     Appearance: Normal appearance.   HENT:      Head: Normocephalic and atraumatic.      Right Ear: Tympanic membrane and ear canal normal.      Left Ear: Tympanic membrane and ear canal normal.      Nose: Congestion present. No rhinorrhea.      Mouth/Throat:      Mouth: Mucous membranes are moist.      Pharynx: Oropharynx is clear. No oropharyngeal exudate or posterior oropharyngeal erythema.   Eyes:      Extraocular Movements: Extraocular movements intact.      Pupils: Pupils are equal, round, and reactive to light.   Neck:      Musculoskeletal: Normal range of motion.   Cardiovascular:      Rate and Rhythm: Normal rate and regular rhythm.      Heart sounds: Normal heart sounds.   Pulmonary:      Effort: Pulmonary effort is normal.      Breath sounds: Normal breath sounds. No wheezing, rhonchi or rales.   Musculoskeletal: Normal range of motion.   Skin:     General: Skin is warm and dry.   Neurological:      General: No focal deficit present.      Mental Status: He is alert.   Psychiatric:         Mood and Affect: Mood normal.         Behavior: Behavior normal.            Diagnostic Test Results:  Labs reviewed in Epic  none         Assessment & Plan     1. Cough  2. Viral URI  Low suspicion for pneumonia given normal vital signs and lungs clear to auscultation.  Low suspicion for PE as patient's vital signs are normal, patient denies dyspnea on exertion, and patient denies symptoms hemoptysis.  Symptoms likely  viral.  Discussed Intermatic treatment and OTC options.  Recommended follow-up if symptoms not improving.         See Patient Instructions    Return in about 2 weeks (around 3/5/2020), or if symptoms worsen or fail to improve.    Maine Ravi PA-C  [facility]

## 2020-02-20 NOTE — NURSING NOTE
"Initial /82 (BP Location: Left arm, Cuff Size: Adult Large)   Pulse 92   Temp 98.2  F (36.8  C) (Tympanic)   Wt 84.3 kg (185 lb 12.8 oz)   SpO2 98%   BMI 29.10 kg/m   Estimated body mass index is 29.1 kg/m  as calculated from the following:    Height as of 2/14/20: 1.702 m (5' 7\").    Weight as of this encounter: 84.3 kg (185 lb 12.8 oz). .    "

## 2020-02-20 NOTE — PATIENT INSTRUCTIONS
Your exam is very reassuring and does not show signs of pneumonia or blood clots in your lungs.  Your symptoms are likely due to a virus.  There are no antibiotics to treat viruses.  For symptoms I recommend:  -Over-the-counter Mucinex DM during the day for cough and congestion  -You may continue NyQuil at night  -Ibuprofen for pain and inflammation  -Humidifier to moisten nasal passages.  -Over-the-counter Flonase to help reduce nasal congestion.    Return to clinic if her symptoms or not improving.    Patient Education     Viral Upper Respiratory Illness (Adult)    You have a viral upper respiratory illness (URI), which is another term for the common cold. This illness is contagious during the first few days. It is spread through the air by coughing and sneezing. It may also be spread by direct contact (touching the sick person and then touching your own eyes, nose, or mouth). Frequent handwashing will decrease risk of spread. Most viral illnesses go away within 7 to 10 days with rest and simple home remedies. Sometimes the illness may last for several weeks. Antibiotics will not kill a virus, and they are generally not prescribed for this condition.  Home care    If symptoms are severe, rest at home for the first 2 to 3 days. When you resume activity, don't let yourself get too tired.    Don't smoke. If you need help stopping, talk with your healthcare provider.    Avoid being exposed to cigarette smoke (yours or others ).    You may use acetaminophen or ibuprofen to control pain and fever, unless another medicine was prescribed. If you have chronic liver or kidney disease, have ever had a stomach ulcer or gastrointestinal bleeding, or are taking blood-thinning medicines, talk with your healthcare provider before using these medicines. Aspirin should never be given to anyone under 18 years of age who is ill with a viral infection or fever. It may cause severe liver or brain damage.    Your appetite may be poor,  so a light diet is fine. Stay well hydrated by drinking 6 to 8 glasses of fluids per day (water, soft drinks, juices, tea, or soup). Extra fluids will help loosen secretions in the nose and lungs.    Over-the-counter cold medicines will not shorten the length of time you re sick, but they may be helpful for the following symptoms: cough, sore throat, and nasal and sinus congestion. If you take prescription medicines, ask your healthcare provider or pharmacist which over-the-counter medicines are safe to use. (Note: Don't use decongestants if you have high blood pressure.)  Follow-up care  Follow up with your healthcare provider, or as advised.  When to seek medical advice  Call your healthcare provider right away if any of these occur:    Cough with lots of colored sputum (mucus)    Severe headache; face, neck, or ear pain    Difficulty swallowing due to throat pain    Fever of 100.4 F (38 C) or higher, or as directed by your healthcare provider  Call 911  Call 911 if any of these occur:    Chest pain, shortness of breath, wheezing, or difficulty breathing    Coughing up blood    Very severe pain with swallowing, especially if it goes along with a muffled voice   Date Last Reviewed: 6/1/2018 2000-2019 [employer]. [address] [place] [address], [place] [address]. All rights reserved. This information is not intended as a substitute for professional medical care. Always follow your healthcare professional's instructions.

## 2020-03-02 ENCOUNTER — TELEPHONE (OUTPATIENT)
Dept: UROLOGY | Facility: CLINIC | Age: 55
End: 2020-03-02

## 2020-03-02 NOTE — TELEPHONE ENCOUNTER
Reason for call:  Symptom   Symptom or request: Blood in urine    Duration (how long have symptoms been present): 2 days  Have you been treated for this before? Yes    Additional comments: na    Phone number to reach patient:  Home number on file [phone] (home)    Best Time:  any    Can we leave a detailed message on this number?  YES    Travel screening: Not Applicable

## 2020-03-03 NOTE — TELEPHONE ENCOUNTER
Called and spoke to patient.   Patient is status post-TURP, from 2/2020.  Patient is experiencing haematuria at times and is wondering if this is common.   Per Dr. Solis this is ok for about 2 months after the surgery.   Patient reassured.   Genet Reyes RN

## 2020-03-09 ENCOUNTER — OFFICE VISIT (OUTPATIENT)
Dept: UROLOGY | Facility: CLINIC | Age: 55
End: 2020-03-09
Payer: COMMERCIAL

## 2020-03-09 VITALS — OXYGEN SATURATION: 97 % | SYSTOLIC BLOOD PRESSURE: 136 MMHG | HEART RATE: 92 BPM | DIASTOLIC BLOOD PRESSURE: 84 MMHG

## 2020-03-09 DIAGNOSIS — N40.1 BENIGN PROSTATIC HYPERPLASIA WITH LOWER URINARY TRACT SYMPTOMS, SYMPTOM DETAILS UNSPECIFIED: Primary | ICD-10-CM

## 2020-03-09 PROCEDURE — 51798 US URINE CAPACITY MEASURE: CPT | Mod: 58 | Performed by: UROLOGY

## 2020-03-09 PROCEDURE — 99024 POSTOP FOLLOW-UP VISIT: CPT | Performed by: UROLOGY

## 2020-03-09 NOTE — PROGRESS NOTES
Chief Complaint   Patient presents with     RECHECK     post op TURP       Edis Burroughs is a 54 year old male who presents today for follow up of   Chief Complaint   Patient presents with     RECHECK     post op TURP    f/u post TURP.  He is doing much better since surgery with improved flow and decrease irritative voiding symptoms.  He still has occasional hematuria but also is improving.    Current Outpatient Medications   Medication Sig Dispense Refill     divalproex (DEPAKOTE) 500 MG 24 hr tablet Take 500 mg by mouth daily.       LamoTRIgine 300 MG TB24 Take  by mouth. One daily       losartan-hydrochlorothiazide (HYZAAR) 100-12.5 MG tablet Take 1 tablet by mouth daily 90 tablet 3     QUEtiapine (SEROQUEL) 300 MG tablet Take 600 mg by mouth At Bedtime TID qhs       senna (SENOKOT) 8.6 MG tablet Take 1 tablet by mouth daily       ZOLOFT 100 MG OR TABS 2 TABLET DAILY 60 Tab 1     hydrocortisone (ANUSOL-HC) 2.5 % cream Place rectally 2 times daily as needed for hemorrhoids (Patient not taking: Reported on 2/20/2020) 30 g 2     Allergies   Allergen Reactions     Vicodin [Acetaminophen] Itching, Rash and GI Disturbance     Stomach discomfort      Past Medical History:   Diagnosis Date     Bipolar disorder (H)      Hypertension      Past Surgical History:   Procedure Laterality Date     C APPENDECTOMY  1997     C LAMINECTOMY,FACETECTOMY,LUMBAR  2008     GENITOURINARY SURGERY       LAPAROSCOPIC HERNIORRHAPHY INGUINAL BILATERAL Bilateral 2/11/2020    Procedure: HERNIORRHAPHY, INGUINAL, left LAPAROSCOPIC;  Surgeon: Han Ang MD;  Location: [facility]     Family History   Problem Relation Age of Onset     Prostate Cancer Father      Heart Disease Father         open heart surgery     Cancer Father         lung     BARTOLO.AESCOBAR Father         triple bypass, first MI age 59     Cancer Sister         leukemia     Diabetes Maternal Grandmother      GIOVANNY Maternal Grandmother      Diabetes Maternal Grandfather      GIOVANNY  Maternal Grandfather      Colon Cancer Maternal Grandfather      Cerebrovascular Disease Paternal Grandfather      BARTOLO.AWOODROW. Paternal Grandfather      FARRUKHAWOODROW. Paternal Grandmother      Colon Cancer Maternal Uncle      Hypertension No family hx of      Breast Cancer No family hx of      Cancer - colorectal No family hx of      Social History     Socioeconomic History     Marital status:      Spouse name: None     Number of children: None     Years of education: None     Highest education level: None   Occupational History     None   Social Needs     Financial resource strain: None     Food insecurity     Worry: None     Inability: None     Transportation needs     Medical: None     Non-medical: None   Tobacco Use     Smoking status: Never Smoker     Smokeless tobacco: Never Used   Substance and Sexual Activity     Alcohol use: Yes     Alcohol/week: 0.0 standard drinks     Comment: rarely     Drug use: No     Sexual activity: Yes     Partners: Female   Lifestyle     Physical activity     Days per week: None     Minutes per session: None     Stress: None   Relationships     Social connections     Talks on phone: None     Gets together: None     Attends [employer] service: None     Active member of club or organization: None     Attends meetings of clubs or organizations: None     Relationship status: None     Intimate partner violence     Fear of current or ex partner: None     Emotionally abused: None     Physically abused: None     Forced sexual activity: None   Other Topics Concern     Parent/sibling w/ CABG, MI or angioplasty before 65F 55M? No   Social History Narrative     None       REVIEW OF SYSTEMS  =================  C: NEGATIVE for fever, chills, change in weight  I: NEGATIVE for worrisome rashes, moles or lesions  E/M: NEGATIVE for ear, mouth and throat problems  R: NEGATIVE for significant cough or SHORTNESS OF BREATH  CV:  NEGATIVE for chest pain, palpitations or peripheral edema  GI: NEGATIVE for  nausea, abdominal pain, heartburn, or change in bowel habits  NEURO: NEGATIVE numbness/weakness  : see HPI  PSYCH: NEGATIVE depression/anxiety  LYmph: no new enlarged lymph nodes  Ortho: no new trauma/movements    Physical Exam:  /84 (BP Location: Right arm, Patient Position: Chair, Cuff Size: Adult Large)   Pulse 92   SpO2 97%    Patient is pleasant, in no acute distress, good general condition.  Lung: no evidence of respiratory distress    Abdomen: Soft, nondistended, non tender. No masses. No rebound or guarding.   Exam: Normal male with a residual urine of 89 mL.  Skin: Warm and dry.  No redness.  Psych: normal mood and affect  Neuro: alert and oriented  Musculaskeletal: moving all extremities    Assessment/Plan:   (N40.1) Benign prostatic hyperplasia with lower urinary tract symptoms, symptom details unspecified  (primary encounter diagnosis)  Comment: Doing well post TURP  Plan: MEASURE POST-VOID RESIDUAL URINE/BLADDER         CAPACITY, US NON-IMAGING, PSA tumor marker        Seen 1 year with PSA.

## 2020-06-06 DIAGNOSIS — I10 HYPERTENSION, GOAL BELOW 140/90: ICD-10-CM

## 2020-06-09 RX ORDER — LOSARTAN POTASSIUM AND HYDROCHLOROTHIAZIDE 12.5; 1 MG/1; MG/1
TABLET ORAL
Qty: 90 TABLET | Refills: 2 | Status: SHIPPED | OUTPATIENT
Start: 2020-06-09

## 2020-09-25 ENCOUNTER — OFFICE VISIT (OUTPATIENT)
Dept: FAMILY MEDICINE | Facility: CLINIC | Age: 55
End: 2020-09-25
Payer: COMMERCIAL

## 2020-09-25 VITALS
BODY MASS INDEX: 29.99 KG/M2 | HEIGHT: 66 IN | DIASTOLIC BLOOD PRESSURE: 94 MMHG | OXYGEN SATURATION: 100 % | TEMPERATURE: 97.2 F | SYSTOLIC BLOOD PRESSURE: 132 MMHG | WEIGHT: 186.6 LBS | HEART RATE: 91 BPM

## 2020-09-25 DIAGNOSIS — K21.9 GASTROESOPHAGEAL REFLUX DISEASE WITHOUT ESOPHAGITIS: ICD-10-CM

## 2020-09-25 DIAGNOSIS — R07.9 CHEST PAIN, UNSPECIFIED TYPE: ICD-10-CM

## 2020-09-25 DIAGNOSIS — M94.0 COSTOCHONDRITIS: Primary | ICD-10-CM

## 2020-09-25 PROCEDURE — 99214 OFFICE O/P EST MOD 30 MIN: CPT | Performed by: NURSE PRACTITIONER

## 2020-09-25 PROCEDURE — 93000 ELECTROCARDIOGRAM COMPLETE: CPT | Performed by: NURSE PRACTITIONER

## 2020-09-25 ASSESSMENT — MIFFLIN-ST. JEOR: SCORE: 1628.13

## 2020-09-25 NOTE — PATIENT INSTRUCTIONS
Patient Education     Costochondritis    Costochondritis is inflammation of a rib or the cartilage that connects a rib to your breastbone (sternum). It causes tenderness, and sometimes chest pain may be sharp or aching, or it may feel like pressure. Pain may get worse with deep breathing, movement, or exercise. In some cases, the pain is mistaken for a heart attack. Despite this, the condition is not serious. Read on to learn more about the condition and how it can be treated.  What causes costochondritis?  The cause of costochondritis is not completely clear, but it may happen after a chest injury, chest infection, or coughing episode. Some physical activities can sometimes lead to costochondritis. Large-breasted women may be more likely to have the condition. Often, the reason for the inflammation is unknown.  Diagnosing costochondritis  There is no test for costochondritis. The condition is diagnosed by the symptoms you have. Your healthcare provider will perform a physical exam. He or she will ask you about your symptoms and examine your chest for tenderness. In some cases, tests are done to rule out more serious problems. These tests may include imaging tests such as chest X-ray, CT scan, or an ECG.  Treating costochondritis  If an underlying cause is found, treatment for that will likely relieve the problem. Costochondritis often goes away on its own. The course of the condition varies from person to person. It usually lasts from weeks to months. In some cases, mild symptoms continue for months to years. To ease symptoms:    Take medicine as directed. These relieve pain and swelling. Ibuprofen or other NSAIDs are often recommended. In some cases, you may be given prescription medicine, such as muscle relaxants.    Avoid activities that put stress on the chest or spine.    Apply a heating pad (set to warm, not too high, heat) to the breastbone several times a day.    Perform stretching exercises as  directed.  Call the healthcare provider right away if you have any of the following:    Pain that is not relieved by medicine    Shortness of breath    Lightheadedness, dizziness, or fainting    Feeling of irregular heartbeat or fast pulse  Anyone with chest pain should see a healthcare provider, especially those who are older and may be at risk for heart disease.   Date Last Reviewed: 10/1/2016    3514-2804 [employer]. [address] [place] [address], [place] [address]. All rights reserved. This information is not intended as a substitute for professional medical care. Always follow your healthcare professional's instructions.           Patient Education     GERD (Adult)    The esophagus is a tube that carries food from the mouth to the stomach. A valve (the LES, lower esophageal sphincter) at the lower end of the esophagus prevents stomach acid from flowing upward. When this valve doesn't work properly, stomach contents may repeatedly flow back up (reflux) into the esophagus. This is called gastroesophageal reflux disease (GERD). GERD can irritate the esophagus. It can cause problems with pain, swallowing or breathing. In severe cases, GERD can cause recurrent pneumonia (from aspiration or breathing in particles) or other serious problems.  Symptoms of reflux include burning, pressure or sharp pain in the upper abdomen or mid to lower chest. The pain can spread to the neck, back, or shoulder. There may be belching, an acid taste in the back of the throat, chronic cough, or sore throat, or hoarseness. GERD symptoms often occur during the day after a big meal. They can also occur at night when lying down.   Home care  Lifestyle changes can help reduce symptoms. If needed, your healthcare provider may prescribe medicines. Symptoms often improve with treatment, but if treatment is stopped, the symptoms often return after a few months. So most persons with GERD will need to continue treatment or get treatment on  "and off.  Lifestyle changes    Limit or avoid fatty, fried, and spicy foods, as well as coffee, chocolate, mint, and foods with high acid content such as tomatoes and citrus fruit and juices (orange, grapefruit, lemon).    Don t eat large meals, especially at night. Frequent, smaller meals are best. Don't lie down right after eating. And don t eat anything 3 hours before going to bed.    Don't drink alcohol or smoke. As much as possible, stay away from second hand smoke.    If you are overweight, losing weight will reduce symptoms.     Don't wear tight clothing around your stomach area.    If your symptoms occur during sleep, use a foam wedge to elevate your upper body (not just your head.) Or, place 4\" blocks under the head of your bed. Or use 2 bed risers under your bedframe.  Medicines  If needed, medicines can help relieve the symptoms of GERD and prevent damage to the esophagus. Discuss a medicine plan with your healthcare provider. This may include one or more of the following medicines:    Antacids to help neutralize the normal acids in your stomach.    Acid blockers (Histamine or H2 blockers) to decrease acid production.    Acid inhibitors (proton pump inhibitors PPIs) to decrease acid production in a different way than the blockers. They may work better, but can take a little longer to take effect.  Take an antacid 30 to 60 minutes after eating and at bedtime, but not at the same time as an acid blocker.  Try not to take medicines such as ibuprofen and aspirin. If you are taking aspirin for your heart or other medical reasons, talk to your healthcare provider about stopping it.  Follow-up care  Follow up with your healthcare provider or as advised by our staff.  When to seek medical advice  Call your healthcare provider if any of the following occur:    Stomach pain gets worse or moves to the lower right abdomen (appendix area)    Chest pain appears or gets worse, or spreads to the back, neck, shoulder, or " arm    An over-the-counter trial of medicine doesn't relieve your symptoms    Weight loss that can't be explained    Trouble or pain swallowing    Frequent vomiting (can t keep down liquids)    Blood in the stool or vomit (red or black in color)    Feeling weak or dizzy    Fever of 100.4 F (38 C) or higher, or as directed by your healthcare provider  Date Last Reviewed: 3/1/2018    9495-0696 [employer]. [address] [place] [address], [place] [address]. All rights reserved. This information is not intended as a substitute for professional medical care. Always follow your healthcare professional's instructions.

## 2020-09-25 NOTE — PROGRESS NOTES
"Subjective     Edis Burroughs is a 54 year old male who presents to clinic today for the following health issues:    HPI       Musculoskeletal problem/pain  Onset/Duration: 1.5 weeks   Description  Location: chest - bilateral  Joint Swelling: no  Redness: no  Pain: Dull ache   Warmth: no  Intensity:  mild  Progression of Symptoms:  Improving over the last few days   Accompanying signs and symptoms:   Fevers: no  Numbness/tingling/weakness: no  History  Trauma to the area: YES- doing yard work outside and had problems the next day.   Recent illness:  no  Previous similar problem: no  Previous evaluation:  no  Precipitating or alleviating factors:  Aggravating factors include: \"just comes and goes\". Pain is just there and intermittent.   Therapies tried and outcome: acetaminophen    Review of Systems   Constitutional, HEENT, cardiovascular, pulmonary, gi and gu systems are negative, except as otherwise noted.      Objective    BP (!) 132/94   Pulse 91   Temp 97.2  F (36.2  C) (Tympanic)   Ht 1.683 m (5' 6.25\")   Wt 84.6 kg (186 lb 9.6 oz)   SpO2 100%   BMI 29.89 kg/m    Body mass index is 29.89 kg/m .  Physical Exam   GENERAL: healthy, alert and no distress  EYES: Eyes grossly normal to inspection, PERRL and conjunctivae and sclerae normal  HENT: ear canals and TM's normal, nose and mouth without ulcers or lesions  NECK: no adenopathy, no asymmetry, masses, or scars and thyroid normal to palpation  RESP: lungs clear to auscultation - no rales, rhonchi or wheezes  CV: regular rate and rhythm, normal S1 S2, no S3 or S4, no murmur, click or rub, no peripheral edema and peripheral pulses strong tenderness to mid sternum area bilateral with palpitation  ABDOMEN: soft, nontender, no hepatosplenomegaly, no masses and bowel sounds normal  MS: no gross musculoskeletal defects noted, no edema  SKIN: no suspicious lesions or rashes  NEURO: Normal strength and tone, mentation intact and speech normal  PSYCH: mentation " "appears normal, affect normal/bright    EKG normal Sinus           Assessment & Plan     Costochondritis  EKG within normal limits symptoms most representative of costochondritis recommend ibuprofen and continue to monitor if not improving over the next couple of days should contact me    Gastroesophageal reflux disease without esophagitis  Small potential could be acid reflex compounding this would recommend attempting over-the-counter acid medication    Chest pain, unspecified type  EKG within normal limits pain is been present for greater than 1 to 2 weeks no pain with exertion pain is reproducible  - EKG 12-lead complete w/read - Clinics     BMI:   Estimated body mass index is 29.89 kg/m  as calculated from the following:    Height as of this encounter: 1.683 m (5' 6.25\").    Weight as of this encounter: 84.6 kg (186 lb 9.6 oz).           See Patient Instructions    Return in about 1 day (around 9/26/2020), or if symptoms worsen or fail to improve.    KEENAN Woo [facility]    "

## 2021-01-19 ENCOUNTER — OFFICE VISIT (OUTPATIENT)
Dept: FAMILY MEDICINE | Facility: CLINIC | Age: 56
End: 2021-01-19
Payer: COMMERCIAL

## 2021-01-19 ENCOUNTER — ALLIED HEALTH/NURSE VISIT (OUTPATIENT)
Dept: FAMILY MEDICINE | Facility: CLINIC | Age: 56
End: 2021-01-19
Payer: COMMERCIAL

## 2021-01-19 DIAGNOSIS — R51.9 HEADACHE, UNSPECIFIED HEADACHE TYPE: Primary | ICD-10-CM

## 2021-01-19 DIAGNOSIS — R05.9 COUGH: ICD-10-CM

## 2021-01-19 DIAGNOSIS — R09.81 NASAL CONGESTION: ICD-10-CM

## 2021-01-19 DIAGNOSIS — U07.1 INFECTION DUE TO 2019 NOVEL CORONAVIRUS: ICD-10-CM

## 2021-01-19 PROCEDURE — 99213 OFFICE O/P EST LOW 20 MIN: CPT | Mod: 95 | Performed by: FAMILY MEDICINE

## 2021-01-19 PROCEDURE — U0003 INFECTIOUS AGENT DETECTION BY NUCLEIC ACID (DNA OR RNA); SEVERE ACUTE RESPIRATORY SYNDROME CORONAVIRUS 2 (SARS-COV-2) (CORONAVIRUS DISEASE [COVID-19]), AMPLIFIED PROBE TECHNIQUE, MAKING USE OF HIGH THROUGHPUT TECHNOLOGIES AS DESCRIBED BY CMS-2020-01-R: HCPCS | Performed by: FAMILY MEDICINE

## 2021-01-19 PROCEDURE — U0005 INFEC AGEN DETEC AMPLI PROBE: HCPCS | Performed by: FAMILY MEDICINE

## 2021-01-19 PROCEDURE — 99207 PR NO CHARGE LOS: CPT

## 2021-01-19 NOTE — PROGRESS NOTES
Switched to video apt    Assessment & Plan     Headache, unspecified headache type  Nasal congestion  Cough  - Symptomatic COVID-19 Virus (Coronavirus) by PCR; Future  - Symptomatic COVID-19 Virus (Coronavirus) by PCR            No follow-ups on file.    Joy Wagner MD  [facility]    Brendon Randhawa is a 55 year old who presents to clinic today for the following health issues     HPI         Concern for COVID-19  About how many days ago did these symptoms start? 2 weeks  Is this your first visit for this illness? Yes  In the 14 days before your symptoms started, have you had close contact with someone with COVID-19 (Coronavirus)? I do not know.  Do you have a fever or chills? Yes, I felt feverish or had chills one night  Are you having new or worsening difficulty breathing? No  Do you have new or worsening cough? Yes, it's a dry cough.   Have you had any new or unexplained body aches? hair hurts    Have you experienced any of the following NEW symptoms?    Headache: YES    Sore throat: No    Loss of taste or smell: No    Chest pain: No    Diarrhea: No    Rash: No  What treatments have you tried? thera flu, tylenol, ibuprofen  Who do you live with? Son and Grandson  Are you, or a household member, a healthcare worker or a ? No  Do you live in a nursing home, group home, or shelter? No  Do you have a way to get food/medications if quarantined? Yes, I have a friend or family member who can help me.          54 y/o with bipolar with 2 weeks of headache, dry cough, nasal congestion comes and goes. Sensitive spot on the back of the head.     Review of Systems   No diarrhea. No shortness of breath. No fever.       Objective    There were no vitals taken for this visit.  There is no height or weight on file to calculate BMI.  Physical Exam   General: appears well, in no distress   Respiratory: no cough, wheeze, or increased work of breathing  Psych: Mood:euthymic,  Affect:  appropriate, Speech:normal, Thought Processes: normal      Car is [employer]   Will call tomorrow to set up COVID apt    Video visit start: 1:32 pm  Video visit end: 137 pm

## 2021-01-21 LAB
SARS-COV-2 RNA RESP QL NAA+PROBE: ABNORMAL
SPECIMEN SOURCE: ABNORMAL

## 2021-03-05 NOTE — PROGRESS NOTES
Edis is a 55 year old who is being evaluated via a billable video visit.      How would you like to obtain your AVS? [employer]  If the video visit is dropped, the invitation should be resent by: [phone]  Will anyone else be joining your video visit? No      Video Start Time: 815    BPH    Subjective   Edis is a 55 year old who presents for the following health issues bph    HPI       Pleasant 55-year-old male status post TURP in 2020 for BPH.  He is doing well since surgery without any urinary symptoms.  He had Covid recently and is recovering from it.    Review of Systems   Constitutional, HEENT, cardiovascular, pulmonary, gi and gu systems are negative, except as otherwise noted.      Objective           Vitals:  No vitals were obtained today due to virtual visit.    Physical Exam   EYES: Eyes grossly normal to inspection.  No discharge or erythema, or obvious scleral/conjunctival abnormalities.  RESP: No audible wheeze, cough, or visible cyanosis.  No visible retractions or increased work of breathing.    SKIN: Visible skin clear. No significant rash, abnormal pigmentation or lesions.  NEURO: Cranial nerves grossly intact.  Mentation and speech appropriate for age.  PSYCH: Mentation appears normal, affect normal/bright, judgement and insight intact, normal speech and appearance well-groomed.    55-year-old male status post TURP for BPH in 2020.  He is doing well without any complaints.  PSA is pending.  Follow-up as needed.            Video-Visit Details    Type of service:  Video Visit    Video End Time:825    Originating Location (pt. Location): Home    Distant Location (provider location):  [facility] [place]     Platform used for Video Visit: Myrna

## 2021-03-08 ENCOUNTER — VIRTUAL VISIT (OUTPATIENT)
Dept: UROLOGY | Facility: CLINIC | Age: 56
End: 2021-03-08
Payer: COMMERCIAL

## 2021-03-08 DIAGNOSIS — N40.1 BENIGN PROSTATIC HYPERPLASIA WITH LOWER URINARY TRACT SYMPTOMS, SYMPTOM DETAILS UNSPECIFIED: Primary | ICD-10-CM

## 2021-03-08 PROCEDURE — 99212 OFFICE O/P EST SF 10 MIN: CPT | Mod: GT | Performed by: UROLOGY

## 2021-03-23 ENCOUNTER — IMMUNIZATION (OUTPATIENT)
Dept: NURSING | Facility: CLINIC | Age: 56
End: 2021-03-23
Payer: COMMERCIAL

## 2021-03-23 PROCEDURE — 91300 PR COVID VAC PFIZER DIL RECON 30 MCG/0.3 ML IM: CPT

## 2021-03-23 PROCEDURE — 0001A PR COVID VAC PFIZER DIL RECON 30 MCG/0.3 ML IM: CPT

## 2021-03-30 DIAGNOSIS — N40.1 BENIGN PROSTATIC HYPERPLASIA WITH LOWER URINARY TRACT SYMPTOMS, SYMPTOM DETAILS UNSPECIFIED: ICD-10-CM

## 2021-03-30 DIAGNOSIS — F31.0 BIPOLAR I DISORDER, MOST RECENT EPISODE HYPOMANIC (H): Primary | ICD-10-CM

## 2021-03-30 DIAGNOSIS — F41.1 GENERALIZED ANXIETY DISORDER: ICD-10-CM

## 2021-03-30 LAB
ALT SERPL W P-5'-P-CCNC: 27 U/L (ref 0–70)
AST SERPL W P-5'-P-CCNC: 11 U/L (ref 0–45)
PSA SERPL-MCNC: 0.99 UG/L (ref 0–4)
VALPROATE SERPL-MCNC: 71 MG/L (ref 50–100)

## 2021-03-30 PROCEDURE — 84450 TRANSFERASE (AST) (SGOT): CPT | Performed by: PSYCHIATRY & NEUROLOGY

## 2021-03-30 PROCEDURE — 80164 ASSAY DIPROPYLACETIC ACD TOT: CPT | Performed by: PSYCHIATRY & NEUROLOGY

## 2021-03-30 PROCEDURE — 36415 COLL VENOUS BLD VENIPUNCTURE: CPT | Performed by: UROLOGY

## 2021-03-30 PROCEDURE — 84153 ASSAY OF PSA TOTAL: CPT | Performed by: UROLOGY

## 2021-03-30 PROCEDURE — 84460 ALANINE AMINO (ALT) (SGPT): CPT | Performed by: PSYCHIATRY & NEUROLOGY

## 2021-04-07 ENCOUNTER — VIRTUAL VISIT (OUTPATIENT)
Dept: URGENT CARE | Facility: CLINIC | Age: 56
End: 2021-04-07
Payer: COMMERCIAL

## 2021-04-07 ENCOUNTER — NURSE TRIAGE (OUTPATIENT)
Dept: NURSING | Facility: CLINIC | Age: 56
End: 2021-04-07

## 2021-04-07 DIAGNOSIS — R53.81 MALAISE: Primary | ICD-10-CM

## 2021-04-07 PROCEDURE — 99213 OFFICE O/P EST LOW 20 MIN: CPT | Mod: TEL | Performed by: EMERGENCY MEDICINE

## 2021-04-07 NOTE — PROGRESS NOTES
Phone appointment:    Assessment: 3-day history of malaise and slight nasal congestion.  Has received first Covid vaccine as of 3/23/2021.  Patient did test positive for Covid in January.  He did resolve all his symptoms after the January illness.    Plan: Malaise of uncertain etiology.  Will screen for Covid infection but follow-up with PCP may be needed if testing is negative for further evaluation of his symptoms.    HPI: Patient is a 55-year-old male whose had a 3-day history of fatigue.  He said very slight nasal congestion.  He is worried about possible alvin of Covid again.  He did have Covid in January.  His symptoms did resolve and he has been completely better for 1 month.  He did receive his first Covid vaccine shot about 2 weeks ago on 3/23/2021.  No URI symptoms other than noted above.  No fever.  No other Covid symptoms.  Patient admits he is quite anxious and worried and that he does not want Covid again.      ROS: See HPI otherwise normal.    Allergies   Allergen Reactions     Vicodin [Acetaminophen] Itching, Rash and GI Disturbance     Stomach discomfort      Current Outpatient Medications   Medication Sig Dispense Refill     divalproex (DEPAKOTE) 500 MG 24 hr tablet Take 500 mg by mouth daily.       hydrocortisone (ANUSOL-HC) 2.5 % cream Place rectally 2 times daily as needed for hemorrhoids (Patient not taking: Reported on 1/19/2021) 30 g 2     LamoTRIgine 300 MG TB24 Take  by mouth. One daily       losartan-hydrochlorothiazide (HYZAAR) 100-12.5 MG tablet TAKE ONE TABLET BY MOUTH ONCE DAILY 90 tablet 2     QUEtiapine (SEROQUEL) 300 MG tablet Take 600 mg by mouth At Bedtime TID qhs       senna (SENOKOT) 8.6 MG tablet Take 1 tablet by mouth daily       ZOLOFT 100 MG OR TABS 2 TABLET DAILY 60 Tab 1         PE: Physical exam reveals a 55-year-old male was no acute distress.  He is upbeat alert and oriented and nondyspneic sounding.        TREATMENT: None.      ASSESSMENT: 3-day history of  malaise of uncertain etiology.  Will test for Covid but follow-up with PCP may be necessary for further delineation of his symptoms.      DIAGNOSIS: Malaise.      PLAN: Covid PCR ordered.  Testing team to follow.  Follow-up with PCP over the next week if symptoms persist    Time: 10 minutes

## 2021-04-07 NOTE — TELEPHONE ENCOUNTER
Patient calling requesting to know if he should be tested for COVID 19. Stating he has had increased fatigue x 4 days. Patient has received 1st COVID 19 vaccine. Stating he did test positive for COVID 19 1/19/21. Patient stating he is taking fluids, continues with normal activity.  Reporting slight nasal congestion that comes and goes.     Transferred to Central Scheduling.    Lana Mims RN  [facility]      COVID 19 Nurse Triage Plan/Patient Instructions    Please be aware that novel coronavirus (COVID-19) may be circulating in the community. If you develop symptoms such as fever, cough, or SOB or if you have concerns about the presence of another infection including coronavirus (COVID-19), please contact your health care provider or visit [email or web address].     Disposition/Instructions    Virtual Visit with provider recommended. Reference Visit Selection Guide.    Thank you for taking steps to prevent the spread of this virus.  o Limit your contact with others.  o Wear a simple mask to cover your cough.  o Wash your hands well and often.    Resources    [facility]: About COVID-19: [email or web address]    CDC: What to Do If You're Sick: www.cdc.gov/coronavirus/2019-ncov/about/steps-when-sick.html    CDC: Ending Home Isolation: www.cdc.gov/coronavirus/2019-ncov/hcp/disposition-in-home-patients.html     CDC: Caring for Someone: www.cdc.gov/coronavirus/2019-ncov/if-you-are-sick/care-for-someone.html     [facility]: Interim Guidance for Hospital Discharge to Home: www.health.[facility].[place]/diseases/coronavirus/hcp/hospdischarge.pdf    [facility] clinical trials (COVID-19 research studies): [email or web address]     Below are the COVID-19 hotlines at the [place] Department of Health ([facility]). Interpreters are available.   o For health questions: Call 866-870-8559 or 1-394.523.3489 (7 a.m. to 7 p.m.)  o For questions about schools and childcare: Call  [phone] or 1-244.870.1041 (7 a.m. to 7 p.m.)                 .  Additional Information    Negative: Severe difficulty breathing (e.g., struggling for each breath, speaks in single words)    Negative: Shock suspected (e.g., cold/pale/clammy skin, too weak to stand, low BP, rapid pulse)    Negative: Difficult to awaken or acting confused (e.g., disoriented, slurred speech)    Negative: Fainted > 15 minutes ago and still feels too weak or dizzy to stand    Negative: SEVERE weakness (i.e., unable to walk or barely able to walk, requires support) and new onset or worsening    Negative: Sounds like a life-threatening emergency to the triager    Negative: Difficulty breathing    Negative: Heart beating < 50 beats per minute OR > 140 beats per minute    Negative: Extra heartbeats OR irregular heart beating (i.e., 'palpitations')    Negative: Follows bleeding (e.g., from vomiting, rectum, vagina)    Negative: Bloody, black, or tarry bowel movements    Negative: MODERATE weakness from poor fluid intake with no improvement after 2 hours of rest and fluids    Negative: Drinking very little and dehydration suspected (e.g., no urine > 12 hours, very dry mouth, very lightheaded)    Negative: Patient sounds very sick or weak to the triager    Negative: MODERATE weakness (i.e., interferes with work, school, normal activities) and cause unknown    Negative: Fever > 103 F (39.4 C) and not able to get the fever down using CARE ADVICE    Negative: Fever > 100.0 F (37.8 C) and bedridden (e.g., nursing home patient, stroke, chronic illness, recovering from surgery)    Negative: Fever > 101 F (38.3 C) and over 60 years of age    Negative: Fever > 100.0 F (37.8 C) and diabetes mellitus or weak immune system (e.g., HIV positive, cancer chemo, splenectomy, organ transplant, chronic steroids)    Negative: Pale skin (pallor)    MODERATE weakness (i.e., interferes with work, school, normal activities) and persists > 3 days    Protocols  used: WEAKNESS (GENERALIZED) AND FATIGUE-A-OH

## 2021-04-08 DIAGNOSIS — R53.81 MALAISE: ICD-10-CM

## 2021-04-08 PROCEDURE — U0005 INFEC AGEN DETEC AMPLI PROBE: HCPCS | Performed by: EMERGENCY MEDICINE

## 2021-04-08 PROCEDURE — U0003 INFECTIOUS AGENT DETECTION BY NUCLEIC ACID (DNA OR RNA); SEVERE ACUTE RESPIRATORY SYNDROME CORONAVIRUS 2 (SARS-COV-2) (CORONAVIRUS DISEASE [COVID-19]), AMPLIFIED PROBE TECHNIQUE, MAKING USE OF HIGH THROUGHPUT TECHNOLOGIES AS DESCRIBED BY CMS-2020-01-R: HCPCS | Performed by: EMERGENCY MEDICINE

## 2021-04-09 LAB
SARS-COV-2 RNA RESP QL NAA+PROBE: NOT DETECTED
SPECIMEN SOURCE: NORMAL

## 2021-04-10 ENCOUNTER — HEALTH MAINTENANCE LETTER (OUTPATIENT)
Age: 56
End: 2021-04-10

## 2021-04-13 ENCOUNTER — IMMUNIZATION (OUTPATIENT)
Dept: NURSING | Facility: CLINIC | Age: 56
End: 2021-04-13
Attending: INTERNAL MEDICINE
Payer: COMMERCIAL

## 2021-04-13 PROCEDURE — 0002A PR COVID VAC PFIZER DIL RECON 30 MCG/0.3 ML IM: CPT

## 2021-04-13 PROCEDURE — 91300 PR COVID VAC PFIZER DIL RECON 30 MCG/0.3 ML IM: CPT

## 2021-04-21 ENCOUNTER — OFFICE VISIT (OUTPATIENT)
Dept: FAMILY MEDICINE | Facility: CLINIC | Age: 56
End: 2021-04-21
Payer: COMMERCIAL

## 2021-04-21 VITALS
WEIGHT: 195 LBS | BODY MASS INDEX: 31.24 KG/M2 | OXYGEN SATURATION: 97 % | TEMPERATURE: 99.1 F | SYSTOLIC BLOOD PRESSURE: 132 MMHG | HEART RATE: 95 BPM | DIASTOLIC BLOOD PRESSURE: 84 MMHG

## 2021-04-21 DIAGNOSIS — F31.9 BIPOLAR AFFECTIVE DISORDER, REMISSION STATUS UNSPECIFIED (H): ICD-10-CM

## 2021-04-21 DIAGNOSIS — I10 HYPERTENSION, GOAL BELOW 140/90: Primary | ICD-10-CM

## 2021-04-21 DIAGNOSIS — R60.0 HAND EDEMA: ICD-10-CM

## 2021-04-21 PROBLEM — K40.90 LEFT INGUINAL HERNIA: Status: RESOLVED | Noted: 2020-01-15 | Resolved: 2021-04-21

## 2021-04-21 LAB
ANION GAP SERPL CALCULATED.3IONS-SCNC: 5 MMOL/L (ref 3–14)
BUN SERPL-MCNC: 14 MG/DL (ref 7–30)
CALCIUM SERPL-MCNC: 9.1 MG/DL (ref 8.5–10.1)
CHLORIDE SERPL-SCNC: 108 MMOL/L (ref 94–109)
CO2 SERPL-SCNC: 27 MMOL/L (ref 20–32)
CREAT SERPL-MCNC: 0.99 MG/DL (ref 0.66–1.25)
CREAT UR-MCNC: 125 MG/DL
ERYTHROCYTE [DISTWIDTH] IN BLOOD BY AUTOMATED COUNT: 12.8 % (ref 10–15)
GFR SERPL CREATININE-BSD FRML MDRD: 85 ML/MIN/{1.73_M2}
GLUCOSE SERPL-MCNC: 97 MG/DL (ref 70–99)
HCT VFR BLD AUTO: 46.6 % (ref 40–53)
HGB BLD-MCNC: 16 G/DL (ref 13.3–17.7)
MCH RBC QN AUTO: 29.8 PG (ref 26.5–33)
MCHC RBC AUTO-ENTMCNC: 34.3 G/DL (ref 31.5–36.5)
MCV RBC AUTO: 87 FL (ref 78–100)
MICROALBUMIN UR-MCNC: 38 MG/L
MICROALBUMIN/CREAT UR: 30.48 MG/G CR (ref 0–17)
PLATELET # BLD AUTO: 290 10E9/L (ref 150–450)
POTASSIUM SERPL-SCNC: 4.1 MMOL/L (ref 3.4–5.3)
RBC # BLD AUTO: 5.37 10E12/L (ref 4.4–5.9)
SODIUM SERPL-SCNC: 140 MMOL/L (ref 133–144)
WBC # BLD AUTO: 6.6 10E9/L (ref 4–11)

## 2021-04-21 PROCEDURE — 85027 COMPLETE CBC AUTOMATED: CPT | Performed by: FAMILY MEDICINE

## 2021-04-21 PROCEDURE — 82043 UR ALBUMIN QUANTITATIVE: CPT | Performed by: FAMILY MEDICINE

## 2021-04-21 PROCEDURE — 99214 OFFICE O/P EST MOD 30 MIN: CPT | Performed by: FAMILY MEDICINE

## 2021-04-21 PROCEDURE — 36415 COLL VENOUS BLD VENIPUNCTURE: CPT | Performed by: FAMILY MEDICINE

## 2021-04-21 PROCEDURE — 80048 BASIC METABOLIC PNL TOTAL CA: CPT | Performed by: FAMILY MEDICINE

## 2021-04-21 RX ORDER — HYDROCHLOROTHIAZIDE 12.5 MG/1
12.5 CAPSULE ORAL DAILY
Qty: 30 CAPSULE | Refills: 0 | Status: SHIPPED | OUTPATIENT
Start: 2021-04-21 | End: 2023-05-03

## 2021-04-21 NOTE — PATIENT INSTRUCTIONS
*   Not sure. Will check blood tests for kidney function and urine test for protein.     *   Will add another water pill, hydrochlorothiazide, as late in the day as possible. It can cause increased urination.     *   See if this helps.     *   Update me in one month.     *    Watch the salt and sodium in your diet.

## 2021-05-31 ENCOUNTER — RECORDS - HEALTHEAST (OUTPATIENT)
Dept: ADMINISTRATIVE | Facility: CLINIC | Age: 56
End: 2021-05-31

## 2021-09-22 ENCOUNTER — ANCILLARY PROCEDURE (OUTPATIENT)
Dept: GENERAL RADIOLOGY | Facility: CLINIC | Age: 56
End: 2021-09-22
Attending: FAMILY MEDICINE
Payer: COMMERCIAL

## 2021-09-22 ENCOUNTER — OFFICE VISIT (OUTPATIENT)
Dept: ORTHOPEDICS | Facility: CLINIC | Age: 56
End: 2021-09-22
Payer: COMMERCIAL

## 2021-09-22 VITALS — WEIGHT: 192 LBS | BODY MASS INDEX: 30.86 KG/M2 | HEIGHT: 66 IN

## 2021-09-22 DIAGNOSIS — S76.301A RIGHT HAMSTRING INJURY, INITIAL ENCOUNTER: Primary | ICD-10-CM

## 2021-09-22 DIAGNOSIS — S89.91XA INJURY OF RIGHT LOWER EXTREMITY, INITIAL ENCOUNTER: ICD-10-CM

## 2021-09-22 DIAGNOSIS — S76.111A STRAIN OF RIGHT QUADRICEPS, INITIAL ENCOUNTER: ICD-10-CM

## 2021-09-22 DIAGNOSIS — S76.301A RIGHT HAMSTRING INJURY, INITIAL ENCOUNTER: ICD-10-CM

## 2021-09-22 PROCEDURE — 99204 OFFICE O/P NEW MOD 45 MIN: CPT | Performed by: FAMILY MEDICINE

## 2021-09-22 PROCEDURE — 72170 X-RAY EXAM OF PELVIS: CPT | Performed by: RADIOLOGY

## 2021-09-22 RX ORDER — OXYCODONE AND ACETAMINOPHEN 5; 325 MG/1; MG/1
1-2 TABLET ORAL EVERY 6 HOURS PRN
Qty: 16 TABLET | Refills: 0 | Status: SHIPPED | OUTPATIENT
Start: 2021-09-22 | End: 2022-05-25

## 2021-09-22 ASSESSMENT — MIFFLIN-ST. JEOR: SCORE: 1647.63

## 2021-09-22 NOTE — PROGRESS NOTES
Edis Burroughs  :  1965  DOS: 2021  MRN: [id number]    Sports Medicine Clinic Visit    PCP: Ashwini Jessica    Edis Burroughs is a 55 year old male who is seen as an AIC patient presenting with right buttocks and hamstring injury.    Injury: Patient describes injury as climbing ladder, fell backwards, right leg got stuck in ladder forcing leg straight ~ 4+ hours ago.  Pain located over right buttocks, mid hamstring, radiating to posterior knee.  Reports constant radiating, pain to right posterior knee.  Additional Features:  Positive: swelling and weakness.  Symptoms are better with No Treatment tried to date.  Symptoms are worse with: knee extension, walking, sitting.  Other evaluation and/or treatments so far consists of: No Treatment tried to date.  Recent imaging completed: No recent imaging completed.  Prior History of related problems: none    Social History: retired    Review of Systems  Musculoskeletal: as above  Remainder of review of systems is negative including constitutional, CV, pulmonary, GI, Skin and Neurologic except as noted in HPI or medical history.    Past Medical History:   Diagnosis Date     Bipolar disorder (H)      Hypertension      Left inguinal hernia 1/15/2020    Added automatically from request for surgery [id number]     Past Surgical History:   Procedure Laterality Date     C APPENDECTOMY       C LAMINECTOMY,FACETECTOMY,LUMBAR       GENITOURINARY SURGERY       LAPAROSCOPIC HERNIORRHAPHY INGUINAL BILATERAL Bilateral 2020    Procedure: HERNIORRHAPHY, INGUINAL, left LAPAROSCOPIC;  Surgeon: Han Ang MD;  Location: [facility] OR     Family History   Problem Relation Age of Onset     Prostate Cancer Father      Heart Disease Father         open heart surgery     Cancer Father         lung     C.A.AMISH. Father         triple bypass, first MI age 59     Cancer Sister         leukemia     Diabetes Maternal Grandmother      C.A.D. Maternal Grandmother      Diabetes  "Maternal Grandfather      LUCHO. Maternal Grandfather      Colon Cancer Maternal Grandfather      Cerebrovascular Disease Paternal Grandfather      FARRUKHAWOODROW. Paternal Grandfather      BARTOLO.AWOODROW. Paternal Grandmother      Colon Cancer Maternal Uncle      Hypertension No family hx of      Breast Cancer No family hx of      Cancer - colorectal No family hx of        Objective  Ht 1.683 m (5' 6.25\")   Wt 87.1 kg (192 lb)   BMI 30.76 kg/m        General: healthy, alert and in no distress      HEENT: no scleral icterus or conjunctival erythema     Skin: no suspicious lesions or rash. No jaundice.     CV: regular rhythm by palpation, 2+ distal pulses, no pedal edema      Resp: normal respiratory effort without conversational dyspnea     Psych: normal mood and affect      Gait: antalgic, appropriate coordination and balance     Neuro: normal light touch sensory exam of the extremities. Motor strength as noted below     Right hip exam    Inspection:        no edema or ecchymosis in hip area       Swelling present in right hamstring > right quad, no clear palpable defect in hamstring, but boggy tissue and painful    ROM:       Decreased terminal active and passive ROM of hip in flexion and extension, painful terminal knee extension due to pain in posterior thigh     Strength:        flexion 4/5       extension 5/5       abduction 5/5       adduction 5/5       Painful and limited resisted knee flexion and extension    Tender:        greater trochanter       Ischial tuberosity mild       significant TTP of the muscle belly of the medial>latera hamstring, TTP of the lateral>medial quadriceps       Generalized TTP of the gluteal muscles    Non Tender:        remainder of hip area       illiac crest       ASIS       SI joint    Sensation:        grossly intact in hip and thigh    Skin:       well perfused       capillary refill brisk    Radiology  Recent Results (from the past 744 hour(s))   XR Pelvis 1/2 Views    Narrative    XR " PELVIS 1/2 VW 9/22/2021 5:46 PM     HISTORY: Pain following injury.    COMPARISON: None.      Impression    IMPRESSION: No ischial tuberosity avulsion fractures are evident. Mild  degenerative changes in the right hip joint with broadening of the  femoral head junction. Postoperative changes in the lower lumbar  spine. The SI joints are    SIDNEY BELTRAN MD         SYSTEM ID:  [id number]         Assessment:  1. Right hamstring injury, initial encounter    2. Strain of right quadriceps, initial encounter    3. Injury of right lower extremity, initial encounter        Plan:  Discussed the assessment with the patient.  Follow up: 1 week  Hip spica wrap today for comfort and support  Crutches provided today, WBAT, progressing if able but ok to stay NWB if more comfortable and practical  Signs of likely Grade II hamstring strain and Grade I vs II quad strain   Overall reassuring exam for fracture, lumbar spine, dislocation relative to the mechanism of injury, we agreed he was overall very shawna  RICE and pain control measures reviewed  Breakthrough pain medication provided for prn use, he will use as little as possible  OTC pain relievers reviewed  Likely will start PT when pain improves and able to increase function  Consider advanced imaging based on clinical progress  XR images independently visualized and reviewed with patient today in clinic  No avulsion fracture noted on XR today  Home handouts provided and supportive care reviewed  All questions were answered today  Contact us with additional questions or concerns  Signs and sx of concern reviewed      Sidney Peña DO, ROLY  Sports Medicine Physician  [facility]      Time spent in chart review, one-on-one evaluation, discussion with patient regarding nature of problem, course, prior treatments, and therapeutic options, share-decision making, procedures and referrals as appropriate/documented, and charting related to the  visit: 32 minutes        Disclaimer: This note consists of symbols derived from keyboarding, dictation and/or voice recognition software. As a result, there may be errors in the script that have gone undetected. Please consider this when interpreting information found in this chart.

## 2021-09-22 NOTE — NURSING NOTE
A hip spica/hamstring compression wrap was applied to the patient.  Reviewed OTC compression short/hip spica options with patient.    Kalen Tate ATC

## 2021-09-22 NOTE — LETTER
2021         RE: Edis Burroughs  [address]  [facility] [address]        Dear Colleague,    Thank you for referring your patient, Edis Burroughs, to the [facility] [place]. Please see a copy of my visit note below.    Edis Burroughs  :  1965  DOS: 2021  MRN: [id number]    Sports Medicine Clinic Visit    PCP: Ashwini Jessica    Edis Burroughs is a 55 year old male who is seen as an AIC patient presenting with right buttocks and hamstring injury.    Injury: Patient describes injury as climbing ladder, fell backwards, right leg got stuck in ladder forcing leg straight ~ 4+ hours ago.  Pain located over right buttocks, mid hamstring, radiating to posterior knee.  Reports constant radiating, pain to right posterior knee.  Additional Features:  Positive: swelling and weakness.  Symptoms are better with No Treatment tried to date.  Symptoms are worse with: knee extension, walking, sitting.  Other evaluation and/or treatments so far consists of: No Treatment tried to date.  Recent imaging completed: No recent imaging completed.  Prior History of related problems: none    Social History: retired    Review of Systems  Musculoskeletal: as above  Remainder of review of systems is negative including constitutional, CV, pulmonary, GI, Skin and Neurologic except as noted in HPI or medical history.    Past Medical History:   Diagnosis Date     Bipolar disorder (H)      Hypertension      Left inguinal hernia 1/15/2020    Added automatically from request for surgery [id number]     Past Surgical History:   Procedure Laterality Date     C APPENDECTOMY       C LAMINECTOMY,FACETECTOMY,LUMBAR       GENITOURINARY SURGERY       LAPAROSCOPIC HERNIORRHAPHY INGUINAL BILATERAL Bilateral 2020    Procedure: HERNIORRHAPHY, INGUINAL, left LAPAROSCOPIC;  Surgeon: Han Ang MD;  Location: [facility] OR     Family History   Problem Relation Age of Onset     Prostate Cancer  "Father      Heart Disease Father         open heart surgery     Cancer Father         lung     C.A.D. Father         triple bypass, first MI age 59     Cancer Sister         leukemia     Diabetes Maternal Grandmother      C.A.D. Maternal Grandmother      Diabetes Maternal Grandfather      C.A.D. Maternal Grandfather      Colon Cancer Maternal Grandfather      Cerebrovascular Disease Paternal Grandfather      C.A.D. Paternal Grandfather      C.A.D. Paternal Grandmother      Colon Cancer Maternal Uncle      Hypertension No family hx of      Breast Cancer No family hx of      Cancer - colorectal No family hx of        Objective  Ht 1.683 m (5' 6.25\")   Wt 87.1 kg (192 lb)   BMI 30.76 kg/m        General: healthy, alert and in no distress      HEENT: no scleral icterus or conjunctival erythema     Skin: no suspicious lesions or rash. No jaundice.     CV: regular rhythm by palpation, 2+ distal pulses, no pedal edema      Resp: normal respiratory effort without conversational dyspnea     Psych: normal mood and affect      Gait: antalgic, appropriate coordination and balance     Neuro: normal light touch sensory exam of the extremities. Motor strength as noted below     Right hip exam    Inspection:        no edema or ecchymosis in hip area       Swelling present in right hamstring > right quad, no clear palpable defect in hamstring, but boggy tissue and painful    ROM:       Decreased terminal active and passive ROM of hip in flexion and extension, painful terminal knee extension due to pain in posterior thigh     Strength:        flexion 4/5       extension 5/5       abduction 5/5       adduction 5/5       Painful and limited resisted knee flexion and extension    Tender:        greater trochanter       Ischial tuberosity mild       significant TTP of the muscle belly of the medial>latera hamstring, TTP of the lateral>medial quadriceps       Generalized TTP of the gluteal muscles    Non Tender:        remainder of hip " area       illiac crest       ASIS       SI joint    Sensation:        grossly intact in hip and thigh    Skin:       well perfused       capillary refill brisk    Radiology  Recent Results (from the past 744 hour(s))   XR Pelvis 1/2 Views    Narrative    XR PELVIS 1/2 VW 9/22/2021 5:46 PM     HISTORY: Pain following injury.    COMPARISON: None.      Impression    IMPRESSION: No ischial tuberosity avulsion fractures are evident. Mild  degenerative changes in the right hip joint with broadening of the  femoral head junction. Postoperative changes in the lower lumbar  spine. The SI joints are    SIDNEY BELTRAN MD         SYSTEM ID:  [id number]         Assessment:  1. Right hamstring injury, initial encounter    2. Strain of right quadriceps, initial encounter    3. Injury of right lower extremity, initial encounter        Plan:  Discussed the assessment with the patient.  Follow up: 1 week  Hip spica wrap today for comfort and support  Crutches provided today, WBAT, progressing if able but ok to stay NWB if more comfortable and practical  Signs of likely Grade II hamstring strain and Grade I vs II quad strain   Overall reassuring exam for fracture, lumbar spine, dislocation relative to the mechanism of injury, we agreed he was overall very shawna  RICE and pain control measures reviewed  Breakthrough pain medication provided for prn use, he will use as little as possible  OTC pain relievers reviewed  Likely will start PT when pain improves and able to increase function  Consider advanced imaging based on clinical progress  XR images independently visualized and reviewed with patient today in clinic  No avulsion fracture noted on XR today  Home handouts provided and supportive care reviewed  All questions were answered today  Contact us with additional questions or concerns  Signs and sx of concern reviewed      Sidney Peña DO, ROLY  Sports Medicine Physician  [facility]      Time  spent in chart review, one-on-one evaluation, discussion with patient regarding nature of problem, course, prior treatments, and therapeutic options, share-decision making, procedures and referrals as appropriate/documented, and charting related to the visit: 32 minutes        Disclaimer: This note consists of symbols derived from keyboarding, dictation and/or voice recognition software. As a result, there may be errors in the script that have gone undetected. Please consider this when interpreting information found in this chart.      Again, thank you for allowing me to participate in the care of your patient.        Sincerely,        Sidney Peña, DO

## 2021-09-28 ENCOUNTER — OFFICE VISIT (OUTPATIENT)
Dept: ORTHOPEDICS | Facility: CLINIC | Age: 56
End: 2021-09-28
Payer: COMMERCIAL

## 2021-09-28 VITALS
WEIGHT: 192 LBS | BODY MASS INDEX: 30.86 KG/M2 | DIASTOLIC BLOOD PRESSURE: 86 MMHG | SYSTOLIC BLOOD PRESSURE: 118 MMHG | HEIGHT: 66 IN

## 2021-09-28 DIAGNOSIS — S76.301D RIGHT HAMSTRING INJURY, SUBSEQUENT ENCOUNTER: Primary | ICD-10-CM

## 2021-09-28 DIAGNOSIS — S76.111D STRAIN OF RIGHT QUADRICEPS, SUBSEQUENT ENCOUNTER: ICD-10-CM

## 2021-09-28 DIAGNOSIS — S89.91XD INJURY OF RIGHT LOWER EXTREMITY, SUBSEQUENT ENCOUNTER: ICD-10-CM

## 2021-09-28 PROCEDURE — 99213 OFFICE O/P EST LOW 20 MIN: CPT | Performed by: FAMILY MEDICINE

## 2021-09-28 ASSESSMENT — MIFFLIN-ST. JEOR: SCORE: 1647.63

## 2021-09-28 NOTE — PROGRESS NOTES
Edis Burroughs  :  1965  DOS: 21  MRN: [id number]    Sports Medicine Clinic Visit    PCP: Ashwini Jessica    Edis Burroughs is a 55 year old male who is seen as an AIC patient presenting with right buttocks and hamstring injury.    Injury: Patient describes injury as climbing ladder, fell backwards, right leg got stuck in ladder forcing leg straight ~ 4+ hours ago.  Pain located over right buttocks, mid hamstring, radiating to posterior knee.  Reports constant radiating, pain to right posterior knee.  Additional Features:  Positive: swelling and weakness.  Symptoms are better with No Treatment tried to date.  Symptoms are worse with: knee extension, walking, sitting.  Other evaluation and/or treatments so far consists of: No Treatment tried to date.  Recent imaging completed: No recent imaging completed.  Prior History of related problems: none    Social History: retired     Interim History - 2021  Since last visit on 21 patient has moderate hamstring pain.  He notes most pain with sitting.  Getting relief with hip spica wrap.  Managing with OTC - Tylenol at this time.  No interim injury.       Review of Systems  Musculoskeletal: as above  Remainder of review of systems is negative including constitutional, CV, pulmonary, GI, Skin and Neurologic except as noted in HPI or medical history.    Past Medical History:   Diagnosis Date     Bipolar disorder (H)      Hypertension      Left inguinal hernia 1/15/2020    Added automatically from request for surgery [id number]     Past Surgical History:   Procedure Laterality Date     C APPENDECTOMY       C LAMINECTOMY,FACETECTOMY,LUMBAR  2008     GENITOURINARY SURGERY       LAPAROSCOPIC HERNIORRHAPHY INGUINAL BILATERAL Bilateral 2020    Procedure: HERNIORRHAPHY, INGUINAL, left LAPAROSCOPIC;  Surgeon: Han Ang MD;  Location: [facility] OR     Family History   Problem Relation Age of Onset     Prostate Cancer Father      Heart Disease  "Father         open heart surgery     Cancer Father         lung     C.A.D. Father         triple bypass, first MI age 59     Cancer Sister         leukemia     Diabetes Maternal Grandmother      C.A.D. Maternal Grandmother      Diabetes Maternal Grandfather      FARRUKHA.D. Maternal Grandfather      Colon Cancer Maternal Grandfather      Cerebrovascular Disease Paternal Grandfather      C.A.D. Paternal Grandfather      C.A.D. Paternal Grandmother      Colon Cancer Maternal Uncle      Hypertension No family hx of      Breast Cancer No family hx of      Cancer - colorectal No family hx of        Objective  /86   Ht 1.683 m (5' 6.25\")   Wt 87.1 kg (192 lb)   BMI 30.76 kg/m        General: healthy, alert and in no distress      HEENT: no scleral icterus or conjunctival erythema     Skin: no suspicious lesions or rash. No jaundice.     CV: regular rhythm by palpation, 2+ distal pulses, no pedal edema      Resp: normal respiratory effort without conversational dyspnea     Psych: normal mood and affect      Gait: antalgic, appropriate coordination and balance     Neuro: normal light touch sensory exam of the extremities. Motor strength as noted below     Right hip exam    Inspection:        no edema or ecchymosis in hip area      Swelling present in right hamstring, mild and improving, no clear palpable defect in hamstring    ROM:      painful terminal knee extension due to pain in posterior thigh.  Improving    Strength:        flexion 5/5       extension 5/5       abduction 5/5       adduction 5/5       Painful and limited resisted knee flexion    Tender:        greater trochanter       Ischial tuberosity mild       Ongoing moderate TTP of the proximal muscle belly of the medial>lateral hamstring, TTP of the lateral>medial quadriceps resolved       Generalized TTP of the gluteal muscles resolved/minimal    Non Tender:        remainder of hip area       illiac crest       ASIS       SI joint    Sensation:        " grossly intact in hip and thigh    Skin:       well perfused       capillary refill brisk    Radiology  Recent Results (from the past 744 hour(s))   XR Pelvis 1/2 Views    Narrative    XR PELVIS 1/2 VW 9/22/2021 5:46 PM     HISTORY: Pain following injury.    COMPARISON: None.      Impression    IMPRESSION: No ischial tuberosity avulsion fractures are evident. Mild  degenerative changes in the right hip joint with broadening of the  femoral head junction. Postoperative changes in the lower lumbar  spine. The SI joints are    SIDNEY BELTRAN MD         SYSTEM ID:  [id number]         Assessment:  1. Right hamstring injury, subsequent encounter    2. Strain of right quadriceps, subsequent encounter    3. Injury of right lower extremity, subsequent encounter        Plan:  Discussed the assessment with the patient.  Follow up: 2 to 4 weeks as needed  Hip spica wrap use reviewed today for comfort and support  Crutches provided previously, no longer needed, continue to advance weightbearing as tolerated   Ongoing signs of likely Grade II hamstring strain and resolving quadricep strain  Overall reassuring clinical progress thus far  RICE and pain control measures reviewed  Breakthrough pain medication provided for prn use previously, he is not requiring this  OTC pain relievers reviewed  Offered PT when as pain is improving, home exercises provided, order declined but will remain available anytime  Consider advanced imaging based on clinical progress, doubtful based on progress thus far  Supportive care reviewed  All questions were answered today  Contact us with additional questions or concerns  Signs and sx of concern reviewed      Sidney Peña DO, ROLY  Sports Medicine Physician  [facility]        Disclaimer: This note consists of symbols derived from keyboarding, dictation and/or voice recognition software. As a result, there may be errors in the script that have gone undetected.  Please consider this when interpreting information found in this chart.

## 2021-09-28 NOTE — LETTER
2021         RE: Edis Burroughs  [address]  [facility] [address]        Dear Colleague,    Thank you for referring your patient, Edis Burroughs, to the [facility] [place]. Please see a copy of my visit note below.    Edis Burroughs  :  1965  DOS: 21  MRN: [id number]    Sports Medicine Clinic Visit    PCP: Ashwini Jessica    Edis Burroughs is a 55 year old male who is seen as an AIC patient presenting with right buttocks and hamstring injury.    Injury: Patient describes injury as climbing ladder, fell backwards, right leg got stuck in ladder forcing leg straight ~ 4+ hours ago.  Pain located over right buttocks, mid hamstring, radiating to posterior knee.  Reports constant radiating, pain to right posterior knee.  Additional Features:  Positive: swelling and weakness.  Symptoms are better with No Treatment tried to date.  Symptoms are worse with: knee extension, walking, sitting.  Other evaluation and/or treatments so far consists of: No Treatment tried to date.  Recent imaging completed: No recent imaging completed.  Prior History of related problems: none    Social History: retired     Interim History - 2021  Since last visit on 21 patient has moderate hamstring pain.  He notes most pain with sitting.  Getting relief with hip spica wrap.  Managing with OTC - Tylenol at this time.  No interim injury.       Review of Systems  Musculoskeletal: as above  Remainder of review of systems is negative including constitutional, CV, pulmonary, GI, Skin and Neurologic except as noted in HPI or medical history.    Past Medical History:   Diagnosis Date     Bipolar disorder (H)      Hypertension      Left inguinal hernia 1/15/2020    Added automatically from request for surgery [id number]     Past Surgical History:   Procedure Laterality Date     C APPENDECTOMY       C LAMINECTOMY,FACETECTOMY,LUMBAR  2008     GENITOURINARY SURGERY        "LAPAROSCOPIC HERNIORRHAPHY INGUINAL BILATERAL Bilateral 2/11/2020    Procedure: HERNIORRHAPHY, INGUINAL, left LAPAROSCOPIC;  Surgeon: Han Ang MD;  Location: [place] OR     Family History   Problem Relation Age of Onset     Prostate Cancer Father      Heart Disease Father         open heart surgery     Cancer Father         lung     C.A.D. Father         triple bypass, first MI age 59     Cancer Sister         leukemia     Diabetes Maternal Grandmother      C.A.D. Maternal Grandmother      Diabetes Maternal Grandfather      C.A.D. Maternal Grandfather      Colon Cancer Maternal Grandfather      Cerebrovascular Disease Paternal Grandfather      C.A.D. Paternal Grandfather      C.A.D. Paternal Grandmother      Colon Cancer Maternal Uncle      Hypertension No family hx of      Breast Cancer No family hx of      Cancer - colorectal No family hx of        Objective  /86   Ht 1.683 m (5' 6.25\")   Wt 87.1 kg (192 lb)   BMI 30.76 kg/m        General: healthy, alert and in no distress      HEENT: no scleral icterus or conjunctival erythema     Skin: no suspicious lesions or rash. No jaundice.     CV: regular rhythm by palpation, 2+ distal pulses, no pedal edema      Resp: normal respiratory effort without conversational dyspnea     Psych: normal mood and affect      Gait: antalgic, appropriate coordination and balance     Neuro: normal light touch sensory exam of the extremities. Motor strength as noted below     Right hip exam    Inspection:        no edema or ecchymosis in hip area      Swelling present in right hamstring, mild and improving, no clear palpable defect in hamstring    ROM:      painful terminal knee extension due to pain in posterior thigh.  Improving    Strength:        flexion 5/5       extension 5/5       abduction 5/5       adduction 5/5       Painful and limited resisted knee flexion    Tender:        greater trochanter       Ischial tuberosity mild       Ongoing moderate TTP of the proximal " muscle belly of the medial>lateral hamstring, TTP of the lateral>medial quadriceps resolved       Generalized TTP of the gluteal muscles resolved/minimal    Non Tender:        remainder of hip area       illiac crest       ASIS       SI joint    Sensation:        grossly intact in hip and thigh    Skin:       well perfused       capillary refill brisk    Radiology  Recent Results (from the past 744 hour(s))   XR Pelvis 1/2 Views    Narrative    XR PELVIS 1/2 VW 9/22/2021 5:46 PM     HISTORY: Pain following injury.    COMPARISON: None.      Impression    IMPRESSION: No ischial tuberosity avulsion fractures are evident. Mild  degenerative changes in the right hip joint with broadening of the  femoral head junction. Postoperative changes in the lower lumbar  spine. The SI joints are    SIDNEY BELTRAN MD         SYSTEM ID:  [id number]         Assessment:  1. Right hamstring injury, subsequent encounter    2. Strain of right quadriceps, subsequent encounter    3. Injury of right lower extremity, subsequent encounter        Plan:  Discussed the assessment with the patient.  Follow up: 2 to 4 weeks as needed  Hip spica wrap use reviewed today for comfort and support  Crutches provided previously, no longer needed, continue to advance weightbearing as tolerated   Ongoing signs of likely Grade II hamstring strain and resolving quadricep strain  Overall reassuring clinical progress thus far  RICE and pain control measures reviewed  Breakthrough pain medication provided for prn use previously, he is not requiring this  OTC pain relievers reviewed  Offered PT when as pain is improving, home exercises provided, order declined but will remain available anytime  Consider advanced imaging based on clinical progress, doubtful based on progress thus far  Supportive care reviewed  All questions were answered today  Contact us with additional questions or concerns  Signs and sx of concern reviewed      Sidney Peña, , CAQ  Sports  Medicine Physician  [facility]        Disclaimer: This note consists of symbols derived from keyboarding, dictation and/or voice recognition software. As a result, there may be errors in the script that have gone undetected. Please consider this when interpreting information found in this chart.      Again, thank you for allowing me to participate in the care of your patient.        Sincerely,        Sidney Peña, DO

## 2022-05-07 ENCOUNTER — HEALTH MAINTENANCE LETTER (OUTPATIENT)
Age: 57
End: 2022-05-07

## 2022-05-15 ENCOUNTER — HOSPITAL ENCOUNTER (EMERGENCY)
Facility: CLINIC | Age: 57
Discharge: HOME | End: 2022-05-15
Attending: EMERGENCY MEDICINE | Admitting: EMERGENCY MEDICINE
Payer: COMMERCIAL

## 2022-05-15 VITALS
TEMPERATURE: 98.3 F | WEIGHT: 200 LBS | HEART RATE: 76 BPM | DIASTOLIC BLOOD PRESSURE: 97 MMHG | SYSTOLIC BLOOD PRESSURE: 145 MMHG | HEIGHT: 67 IN | RESPIRATION RATE: 16 BRPM | OXYGEN SATURATION: 98 % | BODY MASS INDEX: 31.39 KG/M2

## 2022-05-15 DIAGNOSIS — S61.412A LACERATION OF LEFT HAND WITHOUT FOREIGN BODY, INITIAL ENCOUNTER: ICD-10-CM

## 2022-05-15 DIAGNOSIS — T14.8XXA NERVE INJURY: ICD-10-CM

## 2022-05-15 PROCEDURE — 99282 EMERGENCY DEPT VISIT SF MDM: CPT | Mod: 25 | Performed by: EMERGENCY MEDICINE

## 2022-05-15 PROCEDURE — 99283 EMERGENCY DEPT VISIT LOW MDM: CPT | Performed by: EMERGENCY MEDICINE

## 2022-05-15 PROCEDURE — 12001 RPR S/N/AX/GEN/TRNK 2.5CM/<: CPT | Performed by: EMERGENCY MEDICINE

## 2022-05-15 ASSESSMENT — ENCOUNTER SYMPTOMS
WOUND: 1
ARTHRALGIAS: 0

## 2022-05-15 NOTE — ED PROVIDER NOTES
History     Chief Complaint   Patient presents with     Laceration     Sharp knife vs left hand Digit #2. Digit remains numb.        HPI  Edis Burroughs is a 56 year old left hand dominant male with history bipolar affective disorder, hypertension, presents emergency department for evaluation of injury to left hand.  Patient was working in the yard and accidentally cut the palm of his left hand with a knife.  No weakness and able to make a  but does have numbness along the lateral aspect of his long finger.  Unclear of most recent tetanus vaccination but thinks it was in the past 5 years.  Is retired from [employer].  Was in his usual state of health prior to the injury.    Chart review shows most recent tetanus vaccination 3/11/2019.    The patient's PMHx, Surgical Hx, Allergies, and Medications were all reviewed with the patient.    Allergies:  Allergies   Allergen Reactions     Vicodin [Acetaminophen] Itching, Rash and GI Disturbance     Stomach discomfort       Problem List:    Patient Active Problem List    Diagnosis Date Noted     Tubular adenoma of colon 06/28/2019     Priority: Medium     Hypertension, goal below 140/90 03/12/2018     Priority: Medium     Bipolar affective disorder, remission status unspecified (H) 11/22/2016     Priority: Medium        Past Medical History:    Past Medical History:   Diagnosis Date     Bipolar disorder (H)      Hypertension      Left inguinal hernia 1/15/2020       Past Surgical History:    Past Surgical History:   Procedure Laterality Date     GENITOURINARY SURGERY       LAPAROSCOPIC HERNIORRHAPHY INGUINAL BILATERAL Bilateral 2/11/2020    Procedure: HERNIORRHAPHY, INGUINAL, left LAPAROSCOPIC;  Surgeon: Han Ang MD;  Location: [place] OR     [facility] APPENDECTOMY  1997     [facility] LAMINECTOMY,FACETECTOMY,LUMBAR  2008       Family History:    Family History   Problem Relation Age of Onset     Prostate Cancer Father      Heart Disease Father         open heart surgery      "Cancer Father         lung     C.A.D. Father         triple bypass, first MI age 59     Cancer Sister         leukemia     Diabetes Maternal Grandmother      C.A.D. Maternal Grandmother      Diabetes Maternal Grandfather      C.A.D. Maternal Grandfather      Colon Cancer Maternal Grandfather      Cerebrovascular Disease Paternal Grandfather      C.A.D. Paternal Grandfather      C.A.D. Paternal Grandmother      Colon Cancer Maternal Uncle      Hypertension No family hx of      Breast Cancer No family hx of      Cancer - colorectal No family hx of        Social History:  Marital Status:   [4]  Social History     Tobacco Use     Smoking status: Never Smoker     Smokeless tobacco: Never Used   Substance Use Topics     Alcohol use: Yes     Alcohol/week: 0.0 standard drinks     Comment: rarely     Drug use: No        Medications:    divalproex (DEPAKOTE) 500 MG 24 hr tablet  hydrochlorothiazide (MICROZIDE) 12.5 MG capsule  LamoTRIgine 300 MG TB24  losartan-hydrochlorothiazide (HYZAAR) 100-12.5 MG tablet  oxyCODONE-acetaminophen (PERCOCET) 5-325 MG tablet  QUEtiapine (SEROQUEL) 300 MG tablet  senna (SENOKOT) 8.6 MG tablet  ZOLOFT 100 MG OR TABS          Review of Systems   Musculoskeletal: Negative for arthralgias.   Skin: Positive for wound.   Psychiatric/Behavioral: Negative for self-injury.         Physical Exam   BP: (!) 180/105  Pulse: 92  Temp: 98.3  F (36.8  C)  Resp: 16  Height: 170.2 cm (5' 7\")  Weight: 90.7 kg (200 lb)  SpO2: 98 %    Physical Exam  GEN: Awake, alert, and cooperative. No acute distress.  HENT: MMM. External ears and nose normal bilaterally.  EYES: EOM intact. Conjunctiva clear. No discharge.   NECK: Symmetric, freely mobile.   CV : Extremities warm and well perfused.  PULM: Normal effort.  Able to speak in full sentences.  NEURO: Normal speech. Following commands. CN II-XII grossly intact. Answering questions and interacting appropriately.   EXT: 2.5 cm linear laceration.  Just proximal to " the first MCP on palmar surface of hand. Numbness of radial aspect of index finger. Sensation intact to light touch on ulnar (lateral) surface.  Extension against resistance in isolation of MCP, PIP, and DIP.  Brisk capillary refill in nail bed.  INT: Warm and dry.         ED [facility]    -Laceration Repair    Date/Time: 5/15/2022 6:01 PM  Performed by: Atul Keen MD  Authorized by: Atul Keen MD     Risks, benefits and alternatives discussed.      ANESTHESIA (see MAR for exact dosages):     Anesthesia method:  Local infiltration    Local anesthetic:  Lidocaine 1% w/o epi and sodium bicarbonate  LACERATION DETAILS     Location:  Hand    Hand location:  L palm    Length (cm):  2.5    Depth (mm):  2    REPAIR TYPE:     Repair type:  Simple      EXPLORATION:     Wound exploration: wound explored through full range of motion and entire depth of wound probed and visualized      Wound extent: nerve damage      Wound extent: no foreign body and no tendon damage      Contaminated: no      TREATMENT:     Area cleansed with:  Hibiclens    Amount of cleaning:  Standard    Irrigation solution:  Sterile saline    Irrigation volume:  500    Irrigation method:  Syringe    Visualized foreign bodies/material removed: no      SKIN REPAIR     Repair method:  Sutures    Suture size:  4-0    Suture material:  Nylon    Suture technique:  Simple interrupted    Number of sutures:  6    APPROXIMATION     Approximation:  Close    POST-PROCEDURE DETAILS     Dressing:  Antibiotic ointment and non-adherent dressing        PROCEDURE    Patient Tolerance:  Patient tolerated the procedure well with no immediate complications             Critical Care time:  none               No results found for this or any previous visit (from the past 24 hour(s)).    Medications   sodium bicarbonate 8.4 % injection 25 mEq (has no administration in time range)       Assessments & Plan (with Medical  Decision Making)   56 year old left-hand-dominant male with accidental laceration to palmar surface of left hand with sharp knife while working in yard.  Does have sensory deficit on  radial aspect of long finger.  Sensation on ulnar aspect intact.  Brisk capillary refill.  Flexion against resistance in isolation.  Area anesthetized with local infiltration of 1% lidocaine buffered with sodium bicarbonate.  Wound thoroughly explored and no signs of tendon damage.  No foreign body appreciated.  Low concern for retained foreign body as entire wound was well visualized.  Chart review shows that tetanus is up-to-date.  Six 4-0 nylon sutures will need to be removed in 7 to 10 days.  Wound care, follow-up, and ED return precautions discussed with patient.  He expressed good understanding of plan and was discharged in improved condition.      I have reviewed the nursing notes.         New Prescriptions    No medications on file       Final diagnoses:   Laceration of left hand without foreign body, initial encounter   Nerve injury - radial aspect of index finger left hand     Atul Keen MD    5/15/2022   [facility] EMERGENCY DEPT    Disclaimer: This note consists of words and symbols derived from keyboarding and dictation using voice recognition software.  As a result, there may be errors that have gone undetected.  Please consider this when interpreting information found in this note.             Atul Keen MD  05/15/22 [id number]

## 2022-05-15 NOTE — DISCHARGE INSTRUCTIONS
Please keep the wound clean and dry for 24-48 hours. The affected area should be kept elevated as much as possible.  After 24 hours, the dressing may be removed and the wound may be gently cleaned with warm water and soap.  You may apply petroleum based ointment as desired.  You should return in 7-10 days to your primary care physician or the emergency department for suture removal.       Any time the skin is damaged, scar formation is unavoidable. You can minimize the scar by following the above instructions and preventing infection. The scar will continue to evolve for at least 1 year. Final appearance of the scar will not be known until at least that time. Please apply sun screen to the scar before any sun exposure for the first year as sunburn or even tanning may cause the scar to become discolored and lead to poor cosmetic outcomes. If after 1 year, you are unhappy with the appearance of the scar you should seek follow-up with the appropriate health care professional to discuss further options.    You should return to the emergency department or your primary care physician immediately if you develop warmth, redness, swelling, drainage from the wound, or have fevers.

## 2022-05-15 NOTE — ED TRIAGE NOTES
Sharp knife vs left hand Digit #2. Digit remains numb. Discussed ed vs UC, aware of decision for ED and agreeable.      Triage Assessment     Row Name 05/15/22 5242       Triage Assessment (Adult)    Airway WDL WDL       Respiratory WDL    Respiratory WDL WDL       Skin Circulation/Temperature WDL    Skin Circulation/Temperature WDL WDL       Cardiac WDL    Cardiac WDL WDL       Peripheral/Neurovascular WDL    Peripheral Neurovascular WDL WDL       Cognitive/Neuro/Behavioral WDL    Cognitive/Neuro/Behavioral WDL WDL

## 2022-05-15 NOTE — ED NOTES
"Pt here with linear laceration on palm of left hand distal to 2nd digit, pt cut on \"sharp knife\". Pt states entire 2nd digit feels numb. Able to move finger. States did the same thing to right 2nd digit on a chainsaw. Bleeding controlled.   "

## 2022-05-19 ENCOUNTER — TELEPHONE (OUTPATIENT)
Dept: FAMILY MEDICINE | Facility: CLINIC | Age: 57
End: 2022-05-19
Payer: COMMERCIAL

## 2022-05-19 NOTE — TELEPHONE ENCOUNTER
Reason for Call:  Same Day Appointment, Requested Provider:  Same day or Approval Req time slot for next week     PCP: Ashwini Jessica    Reason for visit: Finger injury recheck & stitch removal    Duration of symptoms: Oneil, May 15th     Have you been treated for this in the past? No    Additional comments: Edis was in the ER in [place] this past Sunday for a finger injury. He received stitches at this visit. He misunderstood at the visit & thought that the ER had scheduled him a follow up visit with Dr. Jessica but now realizes they did not. He was told to be seen in 7-10 days for the recheck & removal. If he could be worked into a same day or approval request time slot sometime next week that would be great & still fit the time frame suggested by [place] ER.     Can we leave a detailed message on this number? YES    Phone number patient can be reached at: Cell number on file:    Telephone Information:   Mobile [phone]       Best Time: any    Call taken on 5/19/2022 at 4:53 PM by Paula Juarez

## 2022-05-19 NOTE — TELEPHONE ENCOUNTER
Scheduled patient next week for a follow up. He stated he needs to see PCP to assess the area and range of motion not just a suture removal.     Kaylyn Siddiqui RN

## 2022-05-25 ENCOUNTER — OFFICE VISIT (OUTPATIENT)
Dept: FAMILY MEDICINE | Facility: CLINIC | Age: 57
End: 2022-05-25
Payer: COMMERCIAL

## 2022-05-25 VITALS
HEART RATE: 80 BPM | OXYGEN SATURATION: 98 % | SYSTOLIC BLOOD PRESSURE: 143 MMHG | WEIGHT: 194.4 LBS | TEMPERATURE: 98.2 F | DIASTOLIC BLOOD PRESSURE: 97 MMHG | BODY MASS INDEX: 30.45 KG/M2

## 2022-05-25 DIAGNOSIS — S61.211D LACERATION OF LEFT INDEX FINGER WITHOUT FOREIGN BODY WITHOUT DAMAGE TO NAIL, SUBSEQUENT ENCOUNTER: Primary | ICD-10-CM

## 2022-05-25 DIAGNOSIS — F31.9 BIPOLAR AFFECTIVE DISORDER, REMISSION STATUS UNSPECIFIED (H): ICD-10-CM

## 2022-05-25 DIAGNOSIS — Z11.59 ENCOUNTER FOR HEPATITIS C SCREENING TEST FOR LOW RISK PATIENT: ICD-10-CM

## 2022-05-25 DIAGNOSIS — I10 HYPERTENSION, GOAL BELOW 140/90: ICD-10-CM

## 2022-05-25 DIAGNOSIS — Z79.899 MEDICATION MANAGEMENT: ICD-10-CM

## 2022-05-25 DIAGNOSIS — Z12.5 SCREENING FOR PROSTATE CANCER: ICD-10-CM

## 2022-05-25 LAB
ALBUMIN SERPL-MCNC: 4.1 G/DL (ref 3.4–5)
ALP SERPL-CCNC: 84 U/L (ref 40–150)
ALT SERPL W P-5'-P-CCNC: 41 U/L (ref 0–70)
ANION GAP SERPL CALCULATED.3IONS-SCNC: 7 MMOL/L (ref 3–14)
AST SERPL W P-5'-P-CCNC: 19 U/L (ref 0–45)
BILIRUB SERPL-MCNC: 1 MG/DL (ref 0.2–1.3)
BUN SERPL-MCNC: 15 MG/DL (ref 7–30)
CALCIUM SERPL-MCNC: 9.1 MG/DL (ref 8.5–10.1)
CHLORIDE BLD-SCNC: 109 MMOL/L (ref 94–109)
CO2 SERPL-SCNC: 27 MMOL/L (ref 20–32)
CREAT SERPL-MCNC: 1 MG/DL (ref 0.66–1.25)
GFR SERPL CREATININE-BSD FRML MDRD: 88 ML/MIN/1.73M2
GLUCOSE BLD-MCNC: 92 MG/DL (ref 70–99)
POTASSIUM BLD-SCNC: 3.9 MMOL/L (ref 3.4–5.3)
PROT SERPL-MCNC: 7 G/DL (ref 6.8–8.8)
PSA SERPL-MCNC: 0.88 UG/L (ref 0–4)
SODIUM SERPL-SCNC: 143 MMOL/L (ref 133–144)

## 2022-05-25 PROCEDURE — G0103 PSA SCREENING: HCPCS | Performed by: FAMILY MEDICINE

## 2022-05-25 PROCEDURE — 99214 OFFICE O/P EST MOD 30 MIN: CPT | Performed by: FAMILY MEDICINE

## 2022-05-25 PROCEDURE — 80053 COMPREHEN METABOLIC PANEL: CPT | Performed by: FAMILY MEDICINE

## 2022-05-25 PROCEDURE — 86803 HEPATITIS C AB TEST: CPT | Performed by: FAMILY MEDICINE

## 2022-05-25 PROCEDURE — 96127 BRIEF EMOTIONAL/BEHAV ASSMT: CPT | Performed by: FAMILY MEDICINE

## 2022-05-25 PROCEDURE — 36415 COLL VENOUS BLD VENIPUNCTURE: CPT | Performed by: FAMILY MEDICINE

## 2022-05-25 ASSESSMENT — PATIENT HEALTH QUESTIONNAIRE - PHQ9
SUM OF ALL RESPONSES TO PHQ QUESTIONS 1-9: 14
SUM OF ALL RESPONSES TO PHQ QUESTIONS 1-9: 14
10. IF YOU CHECKED OFF ANY PROBLEMS, HOW DIFFICULT HAVE THESE PROBLEMS MADE IT FOR YOU TO DO YOUR WORK, TAKE CARE OF THINGS AT HOME, OR GET ALONG WITH OTHER PEOPLE: VERY DIFFICULT

## 2022-05-25 ASSESSMENT — PAIN SCALES - GENERAL: PAINLEVEL: NO PAIN (0)

## 2022-05-25 NOTE — PROGRESS NOTES
Assessment & Plan     (O17.650D) Laceration of left index finger without foreign body without damage to nail, subsequent encounter  (primary encounter diagnosis)  Comment: Routine healing, sutures removed without complications.  Plan: Monitor.    (F31.9) Bipolar affective disorder, remission status unspecified (H)  Comment: Doing well, followed by psychiatry.  Patient is disabled secondary to mental illness.  Plan: Continue current cares and follow-up.    (I10) Hypertension, goal below 140/90  Comment: Above guidelines using 2 drug regimen.  Plan: Advised to focus on lifestyle, blood pressure check in 3 months.      Results for orders placed or performed in visit on 05/25/22   PSA, screen     Status: Normal   Result Value Ref Range    Prostate Specific Antigen Screen 0.88 0.00 - 4.00 ug/L   Hepatitis C Screen Reflex to HCV RNA Quant and Genotype     Status: Normal   Result Value Ref Range    Hepatitis C Antibody Nonreactive Nonreactive    Narrative    Assay performance characteristics have not been established for newborns, infants, and children.   Comprehensive metabolic panel (BMP + Alb, Alk Phos, ALT, AST, Total. Bili, TP)     Status: Normal   Result Value Ref Range    Sodium 143 133 - 144 mmol/L    Potassium 3.9 3.4 - 5.3 mmol/L    Chloride 109 94 - 109 mmol/L    Carbon Dioxide (CO2) 27 20 - 32 mmol/L    Anion Gap 7 3 - 14 mmol/L    Urea Nitrogen 15 7 - 30 mg/dL    Creatinine 1.00 0.66 - 1.25 mg/dL    Calcium 9.1 8.5 - 10.1 mg/dL    Glucose 92 70 - 99 mg/dL    Alkaline Phosphatase 84 40 - 150 U/L    AST 19 0 - 45 U/L    ALT 41 0 - 70 U/L    Protein Total 7.0 6.8 - 8.8 g/dL    Albumin 4.1 3.4 - 5.0 g/dL    Bilirubin Total 1.0 0.2 - 1.3 mg/dL    GFR Estimate 88 >60 mL/min/1.73m2      Patient Instructions      The laceration is healing well.     The finger numbness will get better, may take up to one year to come back.     Blood tests for liver function and hepatitis C screening. PSA blood test.     Colonoscopy  "in 2024,        BMI:   Estimated body mass index is 30.45 kg/m  as calculated from the following:    Height as of 5/15/22: 1.702 m (5' 7\").    Weight as of this encounter: 88.2 kg (194 lb 6.4 oz).   Weight management plan: Discussed healthy diet and exercise guidelines        Return in about 3 months (around 8/25/2022) for BP Recheck.    Ashwini Jessica MD  [facility] ALVARADO Randhawa is a 56 year old who presents for the following health issues     HPI     ED/UC Followup:    Facility:  [facility]  Date of visit: 5/15/2022  Reason for visit: laceration of left hand  Current Status: second left finger numbenss         Review of Systems   Constitutional, HEENT, cardiovascular, pulmonary, gi and gu systems are negative, except as otherwise noted.      Objective    BP (!) 143/97 (BP Location: Left arm, Cuff Size: Adult Large)   Pulse 80   Temp 98.2  F (36.8  C) (Tympanic)   Wt 88.2 kg (194 lb 6.4 oz)   SpO2 98%   BMI 30.45 kg/m    Body mass index is 30.45 kg/m .  Physical Exam   GENERAL: Healthy, alert and no distress  Skin: well healing laceration note left  proximal index finger.   EYES: Eyes grossly normal to inspection, conjunctivae and sclerae normal  RESP: Lungs clear to auscultation - no rales, rhonchi or wheezes  CV: Regular rate and rhythm, normal S1 S2, no murmu  MS: No gross musculoskeletal defects noted, no edema  NEURO: Normal strength and tone, mentation intact and speech normal  PSYCH: Mentation appears normal, affect normal/bright     Ashwini Jessica MD         Answers for HPI/ROS submitted by the patient on 5/25/2022  If you checked off any problems, how difficult have these problems made it for you to do your work, take care of things at home, or get along with other people?: Very difficult  PHQ9 TOTAL SCORE: 14      "

## 2022-05-25 NOTE — PATIENT INSTRUCTIONS
The laceration is healing well.   The finger numbness will get better, may take up to one year to come back.   Blood tests for liver function and hepatitis C screening. PSA blood test.   Colonoscopy in 2024,

## 2022-05-26 LAB — HCV AB SERPL QL IA: NONREACTIVE

## 2022-12-26 ENCOUNTER — HEALTH MAINTENANCE LETTER (OUTPATIENT)
Age: 57
End: 2022-12-26

## 2023-03-08 ENCOUNTER — OFFICE VISIT (OUTPATIENT)
Dept: FAMILY MEDICINE | Facility: CLINIC | Age: 58
End: 2023-03-08
Payer: COMMERCIAL

## 2023-03-08 VITALS
HEIGHT: 66 IN | DIASTOLIC BLOOD PRESSURE: 74 MMHG | WEIGHT: 175.2 LBS | RESPIRATION RATE: 16 BRPM | TEMPERATURE: 97.9 F | BODY MASS INDEX: 28.16 KG/M2 | OXYGEN SATURATION: 95 % | HEART RATE: 94 BPM | SYSTOLIC BLOOD PRESSURE: 122 MMHG

## 2023-03-08 DIAGNOSIS — I10 HYPERTENSION, GOAL BELOW 140/90: ICD-10-CM

## 2023-03-08 DIAGNOSIS — F31.9 BIPOLAR AFFECTIVE DISORDER, REMISSION STATUS UNSPECIFIED (H): ICD-10-CM

## 2023-03-08 DIAGNOSIS — R10.31 RIGHT INGUINAL PAIN: Primary | ICD-10-CM

## 2023-03-08 PROCEDURE — 99214 OFFICE O/P EST MOD 30 MIN: CPT | Performed by: FAMILY MEDICINE

## 2023-03-08 ASSESSMENT — PAIN SCALES - GENERAL: PAINLEVEL: NO PAIN (0)

## 2023-03-08 ASSESSMENT — PATIENT HEALTH QUESTIONNAIRE - PHQ9
SUM OF ALL RESPONSES TO PHQ QUESTIONS 1-9: 18
10. IF YOU CHECKED OFF ANY PROBLEMS, HOW DIFFICULT HAVE THESE PROBLEMS MADE IT FOR YOU TO DO YOUR WORK, TAKE CARE OF THINGS AT HOME, OR GET ALONG WITH OTHER PEOPLE: EXTREMELY DIFFICULT
SUM OF ALL RESPONSES TO PHQ QUESTIONS 1-9: 18

## 2023-03-08 NOTE — PATIENT INSTRUCTIONS
It sounds like a hernia, but I don't feel anything.     I'd recommend seeing a surgeon. Call [phone].     Check with your insurance about seeing a different psychiatrist.     Schedule a routine physical in about 3 months.

## 2023-03-08 NOTE — PROGRESS NOTES
"  Assessment & Plan Does not remember a lot and    (R10.31) Right inguinal pain  (primary encounter diagnosis)  Comment: By history, symptoms seem consistent with a right inguinal hernia.  On exam, not on file.  Status post laparoscopic repair of left inguinal hernia, no hernia seen on the right side.  Plan: Adult General Surg Referral        Advised that the patient follow-up with general surgery for evaluation to see if there is evidence of a hernia.    (I10) Hypertension, goal below 140/90  Comment: Within guidelines.  Plan: Continue current medications, follow-up in 3 months, blood tests needed.    (F31.9) Bipolar affective disorder, remission status unspecified (H)  Comment: Overall doing reasonably well.  Patient is on disability secondary to mental illness.  He states he would like to consider seeing another psychiatrist.  Plan: Advised that he check within his providers at work to see what be covered.  He is to let me know if a referral is needed.    Patient Instructions   It sounds like a hernia, but I don't feel anything.     I'd recommend seeing a surgeon. Call [phone].     Check with your insurance about seeing a different psychiatrist.     Schedule a routine physical in about 3 months.        BMI:   Estimated body mass index is 28.29 kg/m  as calculated from the following:    Height as of this encounter: 1.676 m (5' 5.98\").    Weight as of this encounter: 79.5 kg (175 lb 3.2 oz).   Weight management plan: Discussed healthy diet and exercise guidelines        Return in about 3 months (around 6/8/2023) for Routine Visit.    Ashwini Jessica MD  [facility] Randhawa is a 57 year old, presenting for the following health issues:  Abdominal Pain and Abdominal Pain      History of Present Illness       Reason for visit:  Pain when cough ,sneeze in my abdomen  Symptom onset:  3-4 weeks ago  Symptoms include:  Pain in my abdomen  Symptom intensity:  Moderate  Symptom " progression:  Staying the same  Had these symptoms before:  Yes  Has tried/received treatment for these symptoms:  Yes  Previous treatment was successful:  No  What makes it worse:  [facility]  What makes it better:  Not moving    He eats 2-3 servings of fruits and vegetables daily.He consumes 2 sweetened beverage(s) daily.He exercises with enough effort to increase his heart rate 9 or less minutes per day.  He exercises with enough effort to increase his heart rate 3 or less days per week. He is missing 2 dose(s) of medications per week.    Today's PHQ-9         PHQ-9 Total Score: 18    PHQ-9 Q9 Thoughts of better off dead/self-harm past 2 weeks :   Not at all    How difficult have these problems made it for you to do your work, take care of things at home, or get along with other people: Extremely difficult       Pain History:  When did you first notice your pain? - Acute Pain   Have you seen anyone else for your pain? No  Where in your body do you have pain? Abdominal/Flank Pain  Onset/Duration: x 1mo  Description:   Character: Stabbing and Burning  Location: right lower quadrant  Radiation: None  Intensity: severe  Progression of Symptoms:  same  Accompanying Signs & Symptoms:  Fever/Chills: No  Gas/Bloating: No  Nausea: No  Vomitting: No  Diarrhea: No  Constipation: No  Dysuria or Hematuria: YES  History:   Trauma: No  Previous similar pain: YES  Previous tests done: CT  Precipitating factors:   Does the pain change with:     Food: No    Bowel Movement: YES    Urination: YES   Other factors:  Hurts when coughing or sneezing.  Therapies tried and outcome: None    Review of Systems   CONSTITUTIONAL: NEGATIVE for fever, chills, change in weight  ENT/MOUTH: NEGATIVE for ear, mouth and throat problems  RESP: NEGATIVE for significant cough or SOB  CV: NEGATIVE for chest pain, palpitations or peripheral edema  GI: See above.  PSYCHIATRIC: Overall, doing reasonably well but would like to  "consider seeing another psychiatrist.      Objective    /74   Pulse 94   Temp 97.9  F (36.6  C) (Tympanic)   Resp 16   Ht 1.676 m (5' 5.98\")   Wt 79.5 kg (175 lb 3.2 oz)   SpO2 95%   BMI 28.29 kg/m    Body mass index is 28.29 kg/m .  Physical Exam   GENERAL: Healthy, alert and no distress  EYES: Eyes grossly normal to inspection, conjunctivae and sclerae normal  RESP: Lungs clear to auscultation - no rales, rhonchi or wheezes  CV: Regular rate and rhythm, normal S1 S2, no murmur  GI:  soft,  no HSM,, there is mild tenderness to palpation in the right inguinal area without mass.  : both testes descended, without mass, no mass palpated in the right inguinal canal.  MS: No gross musculoskeletal defects noted, no edema  NEURO: Normal strength and tone, mentation intact and speech normal  PSYCH: Mentation appears normal, affect normal/bright     Ashwini Jessica MD         "

## 2023-03-23 ENCOUNTER — TELEPHONE (OUTPATIENT)
Dept: SURGERY | Facility: CLINIC | Age: 58
End: 2023-03-23

## 2023-03-23 ENCOUNTER — OFFICE VISIT (OUTPATIENT)
Dept: SURGERY | Facility: CLINIC | Age: 58
End: 2023-03-23
Payer: COMMERCIAL

## 2023-03-23 VITALS
BODY MASS INDEX: 28.93 KG/M2 | HEIGHT: 66 IN | TEMPERATURE: 97.5 F | DIASTOLIC BLOOD PRESSURE: 84 MMHG | SYSTOLIC BLOOD PRESSURE: 140 MMHG | WEIGHT: 180 LBS

## 2023-03-23 DIAGNOSIS — K40.90 RIGHT INGUINAL HERNIA: Primary | ICD-10-CM

## 2023-03-23 PROCEDURE — 99204 OFFICE O/P NEW MOD 45 MIN: CPT | Performed by: SURGERY

## 2023-03-23 ASSESSMENT — PAIN SCALES - GENERAL: PAINLEVEL: NO PAIN (0)

## 2023-03-23 NOTE — PROGRESS NOTES
Patient seen in consultation for right inguinal hernia by Ashwini Jessica    HPI:  Patient is a 57 year old male with history of left inguinal hernia repair laparoscopically 3 years ago.  He is done well after this repair.  Apparently at the time it was thought that he may have had bilateral inguinal hernias but reading the operative report they had difficulties with dissection to the right side so they only fixed the left side.  He is also had appendectomy many years ago.  He reports pain with certain movements, coughing, sneezing and having bowel movements.  He denies any episodes where he has had to massage a physical bulge into place.  He is not a smoker and not a diabetic.  Weight is overall stable in the last 6 to 12 months.    Review Of Systems    Skin: negative  Ears/Nose/Throat: negative  Respiratory: No shortness of breath, dyspnea on exertion, cough, or hemoptysis  Cardiovascular: negative  Gastrointestinal: negative  Genitourinary: negative  Musculoskeletal: negative  Neurologic: negative  Hematologic/Lymphatic/Immunologic: negative  Endocrine: negative      Past Medical History:   Diagnosis Date     Bipolar disorder (H)      Hypertension      Left inguinal hernia 1/15/2020    Added automatically from request for surgery [id number]       Past Surgical History:   Procedure Laterality Date     GENITOURINARY SURGERY       LAPAROSCOPIC HERNIORRHAPHY INGUINAL BILATERAL Bilateral 2/11/2020    Procedure: HERNIORRHAPHY, INGUINAL, left LAPAROSCOPIC;  Surgeon: Han Ang MD;  Location: [facility] OR     [facility] APPENDECTOMY  1997     [facility] LAMINECTOMY,FACETECTOMY,LUMBAR  2008       Family History   Problem Relation Age of Onset     Prostate Cancer Father      Heart Disease Father         open heart surgery     Cancer Father         lung     C.A.D. Father         triple bypass, first MI age 59     Cancer Sister         leukemia     Diabetes Maternal Grandmother      C.A.D. Maternal Grandmother      Diabetes Maternal  Grandfather      LUCHO. Maternal Grandfather      Colon Cancer Maternal Grandfather      Cerebrovascular Disease Paternal Grandfather      FARRUKHAWOODROW. Paternal Grandfather      C.AWOODROW. Paternal Grandmother      Colon Cancer Maternal Uncle      Hypertension No family hx of      Breast Cancer No family hx of      Cancer - colorectal No family hx of        Social History     Socioeconomic History     Marital status:      Spouse name: Not on file     Number of children: Not on file     Years of education: Not on file     Highest education level: Not on file   Occupational History     Not on file   Tobacco Use     Smoking status: Never     Smokeless tobacco: Never   Vaping Use     Vaping Use: Never used   Substance and Sexual Activity     Alcohol use: Yes     Alcohol/week: 0.0 standard drinks     Comment: rarely     Drug use: No     Sexual activity: Yes     Partners: Female   Other Topics Concern     Parent/sibling w/ CABG, MI or angioplasty before 65F 55M? No   Social History Narrative     Not on file     Social Determinants of Health     Financial Resource Strain: Not on file   Food Insecurity: Not on file   Transportation Needs: Not on file   Physical Activity: Not on file   Stress: Not on file   Social Connections: Not on file   Intimate Partner Violence: Not on file   Housing Stability: Not on file       Current Outpatient Medications   Medication Sig Dispense Refill     divalproex (DEPAKOTE) 500 MG 24 hr tablet Take 500 mg by mouth daily.       hydrochlorothiazide (MICROZIDE) 12.5 MG capsule Take 1 capsule (12.5 mg) by mouth daily 30 capsule 0     LamoTRIgine 300 MG TB24 One daily       losartan-hydrochlorothiazide (HYZAAR) 100-12.5 MG tablet TAKE ONE TABLET BY MOUTH ONCE DAILY 90 tablet 2     QUEtiapine (SEROQUEL) 300 MG tablet Take 600 mg by mouth At Bedtime TID qhs       senna (SENOKOT) 8.6 MG tablet Take 1 tablet by mouth daily       ZOLOFT 100 MG OR TABS 2 TABLET DAILY 60 Tab 1       Medications and history  "reviewed    Physical exam:  Vitals: BP (!) 140/84   Temp 97.5  F (36.4  C)   Ht 1.676 m (5' 5.98\")   Wt 81.6 kg (180 lb)   BMI 29.07 kg/m    BMI= Body mass index is 29.07 kg/m .    Constitutional: Healthy, alert, non-distressed   Head: Normo-cephalic, atraumatic, no lesions, masses or tenderness   Cardiovascular: RRR, no new murmurs, +S1, +S2 heart sounds, no clicks, rubs or gallops   Respiratory: CTAB, no rales, rhonchi or wheezing, equal chest rise, good respiratory effort   Gastrointestinal: Soft, non-tender, non distended, no rebound rigidity or guarding, no masses or hernias palpated   Groin: Palpable bulge in the right groin with Valsalva consistent with hernia, no hernia felt on the left  : Deferred  Musculoskeletal: Moves all extremities, normal  strength, no deformities noted   Skin: No suspicious lesions or rashes   Psychiatric: Mentation appears normal, affect appropriate   Hematologic/Lymphatic/Immunologic: Normal cervical and supraclavicular lymph nodes   Patient able to get up on table without difficulty.    Labs show:  No results found for this or any previous visit (from the past 24 hour(s)).    Imaging shows:  No results found for this or any previous visit (from the past 744 hour(s)).     Assessment:     ICD-10-CM    1. Right inguinal hernia  K40.90 Case Request: Robot-assisted laparoscopic right inguinal hernia repair with mesh possible open     Case Request: Robot-assisted laparoscopic right inguinal hernia repair with mesh possible open        Plan: I recommend robot-assisted laparoscopic right inguinal hernia pair with mesh possible open.  Due to the unusual findings at the time of last laparoscopic surgery it may not be possible to do this laparoscopically.  We discussed how I would plan for this approach but should I not feel the hernia is amenable to laparoscopic repair we would perform open surgery.  This decision will be made at the time of the operation.  He understands. We " discussed the procedure in detail. We also discussed the risks, benefits, alternatives and post-op care and restrictions. After our informed discussion we decided to proceed with the proposed surgery.    Risks of surgery discussed including, but not limited to bleeding, infection, recurrence, damage to nerves and what is in the hernia sac.  Risks of anesthesia also discussed..  Although mesh is a better long term repair if it gets infected it must be removed.  If there is evidence of an infection at time of surgery it will be cancelled and rescheduled for when infection has resolved.      Discussed massaging hernia back in and using ice if becomes more painful.  If not able to reduce then go to emergency room.    45 minutes spent on the date of the encounter doing chart review, history and exam, documentation and further activities per the note    Javier Vinson, DO

## 2023-03-23 NOTE — LETTER
3/23/2023         RE: Edis Burroughs  [address]  [place] [address]        Dear Colleague,    Thank you for referring your patient, Edis Burroughs, to the [facility]. Please see a copy of my visit note below.    Patient seen in consultation for right inguinal hernia by Ashwini Jessica    HPI:  Patient is a 57 year old male with history of left inguinal hernia repair laparoscopically 3 years ago.  He is done well after this repair.  Apparently at the time it was thought that he may have had bilateral inguinal hernias but reading the operative report they had difficulties with dissection to the right side so they only fixed the left side.  He is also had appendectomy many years ago.  He reports pain with certain movements, coughing, sneezing and having bowel movements.  He denies any episodes where he has had to massage a physical bulge into place.  He is not a smoker and not a diabetic.  Weight is overall stable in the last 6 to 12 months.    Review Of Systems    Skin: negative  Ears/Nose/Throat: negative  Respiratory: No shortness of breath, dyspnea on exertion, cough, or hemoptysis  Cardiovascular: negative  Gastrointestinal: negative  Genitourinary: negative  Musculoskeletal: negative  Neurologic: negative  Hematologic/Lymphatic/Immunologic: negative  Endocrine: negative      Past Medical History:   Diagnosis Date     Bipolar disorder (H)      Hypertension      Left inguinal hernia 1/15/2020    Added automatically from request for surgery [id number]       Past Surgical History:   Procedure Laterality Date     GENITOURINARY SURGERY       LAPAROSCOPIC HERNIORRHAPHY INGUINAL BILATERAL Bilateral 2/11/2020    Procedure: HERNIORRHAPHY, INGUINAL, left LAPAROSCOPIC;  Surgeon: Han Ang MD;  Location: [facility] OR     [facility] APPENDECTOMY  1997     [facility] LAMINECTOMY,FACETECTOMY,LUMBAR  2008       Family History   Problem Relation Age of Onset     Prostate Cancer Father      Heart Disease  Father         open heart surgery     Cancer Father         lung     C.A.D. Father         triple bypass, first MI age 59     Cancer Sister         leukemia     Diabetes Maternal Grandmother      FARRUKHA.AMISH. Maternal Grandmother      Diabetes Maternal Grandfather      FARRUKHAWOODROW. Maternal Grandfather      Colon Cancer Maternal Grandfather      Cerebrovascular Disease Paternal Grandfather      C.A.D. Paternal Grandfather      C.A.D. Paternal Grandmother      Colon Cancer Maternal Uncle      Hypertension No family hx of      Breast Cancer No family hx of      Cancer - colorectal No family hx of        Social History     Socioeconomic History     Marital status:      Spouse name: Not on file     Number of children: Not on file     Years of education: Not on file     Highest education level: Not on file   Occupational History     Not on file   Tobacco Use     Smoking status: Never     Smokeless tobacco: Never   Vaping Use     Vaping Use: Never used   Substance and Sexual Activity     Alcohol use: Yes     Alcohol/week: 0.0 standard drinks     Comment: rarely     Drug use: No     Sexual activity: Yes     Partners: Female   Other Topics Concern     Parent/sibling w/ CABG, MI or angioplasty before 65F 55M? No   Social History Narrative     Not on file     Social Determinants of Health     Financial Resource Strain: Not on file   Food Insecurity: Not on file   Transportation Needs: Not on file   Physical Activity: Not on file   Stress: Not on file   Social Connections: Not on file   Intimate Partner Violence: Not on file   Housing Stability: Not on file       Current Outpatient Medications   Medication Sig Dispense Refill     divalproex (DEPAKOTE) 500 MG 24 hr tablet Take 500 mg by mouth daily.       hydrochlorothiazide (MICROZIDE) 12.5 MG capsule Take 1 capsule (12.5 mg) by mouth daily 30 capsule 0     LamoTRIgine 300 MG TB24 One daily       losartan-hydrochlorothiazide (HYZAAR) 100-12.5 MG tablet TAKE ONE TABLET BY MOUTH  "ONCE DAILY 90 tablet 2     QUEtiapine (SEROQUEL) 300 MG tablet Take 600 mg by mouth At Bedtime TID qhs       senna (SENOKOT) 8.6 MG tablet Take 1 tablet by mouth daily       ZOLOFT 100 MG OR TABS 2 TABLET DAILY 60 Tab 1       Medications and history reviewed    Physical exam:  Vitals: BP (!) 140/84   Temp 97.5  F (36.4  C)   Ht 1.676 m (5' 5.98\")   Wt 81.6 kg (180 lb)   BMI 29.07 kg/m    BMI= Body mass index is 29.07 kg/m .    Constitutional: Healthy, alert, non-distressed   Head: Normo-cephalic, atraumatic, no lesions, masses or tenderness   Cardiovascular: RRR, no new murmurs, +S1, +S2 heart sounds, no clicks, rubs or gallops   Respiratory: CTAB, no rales, rhonchi or wheezing, equal chest rise, good respiratory effort   Gastrointestinal: Soft, non-tender, non distended, no rebound rigidity or guarding, no masses or hernias palpated   Groin: Palpable bulge in the right groin with Valsalva consistent with hernia, no hernia felt on the left  : Deferred  Musculoskeletal: Moves all extremities, normal  strength, no deformities noted   Skin: No suspicious lesions or rashes   Psychiatric: Mentation appears normal, affect appropriate   Hematologic/Lymphatic/Immunologic: Normal cervical and supraclavicular lymph nodes   Patient able to get up on table without difficulty.    Labs show:  No results found for this or any previous visit (from the past 24 hour(s)).    Imaging shows:  No results found for this or any previous visit (from the past 744 hour(s)).     Assessment:     ICD-10-CM    1. Right inguinal hernia  K40.90 Case Request: Robot-assisted laparoscopic right inguinal hernia repair with mesh possible open     Case Request: Robot-assisted laparoscopic right inguinal hernia repair with mesh possible open        Plan: I recommend robot-assisted laparoscopic right inguinal hernia pair with mesh possible open.  Due to the unusual findings at the time of last laparoscopic surgery it may not be possible to do " this laparoscopically.  We discussed how I would plan for this approach but should I not feel the hernia is amenable to laparoscopic repair we would perform open surgery.  This decision will be made at the time of the operation.  He understands. We discussed the procedure in detail. We also discussed the risks, benefits, alternatives and post-op care and restrictions. After our informed discussion we decided to proceed with the proposed surgery.    Risks of surgery discussed including, but not limited to bleeding, infection, recurrence, damage to nerves and what is in the hernia sac.  Risks of anesthesia also discussed..  Although mesh is a better long term repair if it gets infected it must be removed.  If there is evidence of an infection at time of surgery it will be cancelled and rescheduled for when infection has resolved.      Discussed massaging hernia back in and using ice if becomes more painful.  If not able to reduce then go to emergency room.    45 minutes spent on the date of the encounter doing chart review, history and exam, documentation and further activities per the note    Javier Vinson, DO        Again, thank you for allowing me to participate in the care of your patient.        Sincerely,        Javier Vinson, DO

## 2023-03-29 NOTE — TELEPHONE ENCOUNTER
Type of surgery: Robot-assisted laparoscopic right inguinal hernia repair with mesh possible open     Location of surgery: [facility] OR  Date and time of surgery: 5/22  Surgeon: Karel  Pre-Op Appt Date: 5/2  Post-Op Appt Date: 5/31   Packet sent out: Yes  Pre-cert/Authorization completed:  Not Applicable  Date: na

## 2023-05-02 ENCOUNTER — OFFICE VISIT (OUTPATIENT)
Dept: FAMILY MEDICINE | Facility: CLINIC | Age: 58
End: 2023-05-02
Payer: COMMERCIAL

## 2023-05-02 VITALS
HEART RATE: 93 BPM | WEIGHT: 180 LBS | RESPIRATION RATE: 20 BRPM | TEMPERATURE: 97.3 F | SYSTOLIC BLOOD PRESSURE: 138 MMHG | BODY MASS INDEX: 28.25 KG/M2 | OXYGEN SATURATION: 98 % | HEIGHT: 67 IN | DIASTOLIC BLOOD PRESSURE: 88 MMHG

## 2023-05-02 DIAGNOSIS — Z01.818 PREOPERATIVE EXAMINATION: Primary | ICD-10-CM

## 2023-05-02 DIAGNOSIS — I10 HYPERTENSION, GOAL BELOW 140/90: ICD-10-CM

## 2023-05-02 DIAGNOSIS — F31.9 BIPOLAR AFFECTIVE DISORDER, REMISSION STATUS UNSPECIFIED (H): ICD-10-CM

## 2023-05-02 DIAGNOSIS — K40.90 RIGHT INGUINAL HERNIA: ICD-10-CM

## 2023-05-02 PROCEDURE — 99214 OFFICE O/P EST MOD 30 MIN: CPT | Performed by: FAMILY MEDICINE

## 2023-05-02 ASSESSMENT — PAIN SCALES - GENERAL: PAINLEVEL: NO PAIN (0)

## 2023-05-02 NOTE — PATIENT INSTRUCTIONS
Don't take your blood pressure pill the day of surgery.     You may take your other medications.     No aspirin, ibuprofen, or Aleve one week before surgery.

## 2023-05-02 NOTE — PROGRESS NOTES
[facility]  [address]  [place] [address]  Phone: [phone]  Primary Provider: Ashwini Jessica  Pre-op Performing Provider: ASHWINI JESSICA      PREOPERATIVE EVALUATION:  Today's date: 5/2/2023    Edis Burroughs is a 57 year old male who presents for a preoperative evaluation.      5/2/2023     4:44 PM   Additional Questions   Roomed by Jyoti WHITESIDE MA   Accompanied by No one         5/2/2023     4:44 PM   Patient Reported Additional Medications   Patient reports taking the following new medications None     Surgical Information:  Surgery/Procedure: Robot- assisted Laparoscopic Rt. Inguinal Hernia Repair   Surgery Location: [place]  Surgeon:   Surgery Date: 05/22/2023  Time of Surgery: 7:30am  Where patient plans to recover: At home with family  Fax number for surgical facility: Note does not need to be faxed, will be available electronically in Epic.    Assessment & Plan     The proposed surgical procedure is considered INTERMEDIATE risk.    (Z01.818) Preoperative examination  (primary encounter diagnosis)  Comment: No absolute contraindications to surgery.  Plan: May proceed as scheduled.    (K40.90) Right inguinal hernia  Comment: Agree with surgical repair.  Plan: May proceed as scheduled.    (I10) Hypertension, goal below 140/90  Comment: Elevated, second reading high normal but within guidelines.  Tolerating 2 drug regimen, will check renal function and hemoglobin.  Plan: CBC with platelets, Basic metabolic panel  (Ca,        Cl, CO2, Creat, Gluc, K, Na, BUN)            (F31.9) Bipolar affective disorder, remission status unspecified (H)  Comment: Appears to be doing well with current medications, followed by psychiatry.  Patient does have help at home for postoperative recovery.  Plan: Should not affect surgery.       - No identified additional risk factors other than previously addressed    Antiplatelet or Anticoagulation Medication Instructions:    - Patient is on no antiplatelet or anticoagulation medications.    Additional Medication Instructions:  Patient is to take all scheduled medications on the day of surgery EXCEPT for modifications listed below:   - ACE/ARB: HOLD on day of surgery (minimum 11 hours for general anesthesia).    RECOMMENDATION:  APPROVAL GIVEN to proceed with proposed procedure, without further diagnostic evaluation.      Subjective     HPI related to upcoming procedure: Asked to give medical clearance for upcoming hernia surgery which involve general anesthesia.  No absolute contraindications.        5/2/2023     4:38 PM   Preop Questions   1. Have you ever had a heart attack or stroke? No   2. Have you ever had surgery on your heart or blood vessels, such as a stent placement, a coronary artery bypass, or surgery on an artery in your head, neck, heart, or legs? No   3. Do you have chest pain with activity? No   4. Do you have a history of  heart failure? No   5. Do you currently have a cold, bronchitis or symptoms of other infection? No   6. Do you have a cough, shortness of breath, or wheezing? No   7. Do you or anyone in your family have previous history of blood clots? UNKNOWN -    8. Do you or does anyone in your family have a serious bleeding problem such as prolonged bleeding following surgeries or cuts? No   9. Have you ever had problems with anemia or been told to take iron pills? No   10. Have you had any abnormal blood loss such as black, tarry or bloody stools? No   11. Have you ever had a blood transfusion? No   12. Are you willing to have a blood transfusion if it is medically needed before, during, or after your surgery? Yes   13. Have you or any of your relatives ever had problems with anesthesia? UNKNOWN -    14. Do you have sleep apnea, excessive snoring or daytime drowsiness? UNKNOWN -   15. Do you have any artifical heart valves or other implanted medical devices like a pacemaker, defibrillator, or continuous glucose  monitor? No   16. Do you have artificial joints? No   17. Are you allergic to latex? No       Health Care Directive:  Patient does not have a Health Care Directive or Living Will:     Preoperative Review of :   reviewed - no record of controlled substances prescribed.      Status of Chronic Conditions:  See problem list for active medical problems.  Problems all longstanding and stable, except as noted/documented.  See ROS for pertinent symptoms related to these conditions.      Review of Systems  Constitutional, neuro, ENT, endocrine, pulmonary, cardiac, gastrointestinal, genitourinary, musculoskeletal, integument and psychiatric systems are negative, except as otherwise noted.    Patient Active Problem List    Diagnosis Date Noted     Tubular adenoma of colon 06/28/2019     Priority: Medium     Hypertension, goal below 140/90 03/12/2018     Priority: Medium     Bipolar affective disorder, remission status unspecified (H) 11/22/2016     Priority: Medium      Past Medical History:   Diagnosis Date     Bipolar disorder (H)      Hypertension      Left inguinal hernia 1/15/2020    Added automatically from request for surgery [id number]     Past Surgical History:   Procedure Laterality Date     GENITOURINARY SURGERY       LAPAROSCOPIC HERNIORRHAPHY INGUINAL BILATERAL Bilateral 2/11/2020    Procedure: HERNIORRHAPHY, INGUINAL, left LAPAROSCOPIC;  Surgeon: Han Ang MD;  Location: [facility] OR     [facility] APPENDECTOMY  1997     [facility] LAMINECTOMY,FACETECTOMY,LUMBAR  2008     Current Outpatient Medications   Medication Sig Dispense Refill     divalproex (DEPAKOTE) 500 MG 24 hr tablet Take 500 mg by mouth daily.       LamoTRIgine 300 MG TB24 One daily       losartan-hydrochlorothiazide (HYZAAR) 100-12.5 MG tablet TAKE ONE TABLET BY MOUTH ONCE DAILY 90 tablet 2     QUEtiapine (SEROQUEL) 300 MG tablet Take 600 mg by mouth At Bedtime TID qhs       senna (SENOKOT) 8.6 MG tablet Take 1 tablet by mouth daily       ZOLOFT 100 MG OR TABS  "2 TABLET DAILY 60 Tab 1       Allergies   Allergen Reactions     Vicodin [Acetaminophen] Itching, Rash and GI Disturbance     Stomach discomfort        Social History     Tobacco Use     Smoking status: Never     Smokeless tobacco: Never   Vaping Use     Vaping status: Never Used   Substance Use Topics     Alcohol use: Yes     Alcohol/week: 0.0 standard drinks of alcohol     Comment: rarely       History   Drug Use No         Objective     /88   Pulse 93   Temp 97.3  F (36.3  C) (Temporal)   Resp 20   Ht 1.7 m (5' 6.93\")   Wt 81.6 kg (180 lb)   SpO2 98%   BMI 28.25 kg/m      Physical Exam  GENERAL: Healthy, alert and no distress  EYES: Eyes grossly normal to inspection, conjunctivae and sclerae normal  RESP: Lungs clear to auscultation - no rales, rhonchi or wheezes  CV: Regular rate and rhythm, normal S1 S2, no murmur  MS: No gross musculoskeletal defects noted, no edema  NEURO: Normal strength and tone, mentation intact and speech normal  PSYCH: Mentation appears normal, affect normal/bright     Recent Labs   Lab Test 05/25/22  1544      POTASSIUM 3.9   CR 1.00        Diagnostics:  No results found for this or any previous visit (from the past 24 hour(s)).   No EKG required, no history of coronary heart disease, significant arrhythmia, peripheral arterial disease or other structural heart disease.    Revised Cardiac Risk Index (RCRI):  The patient has the following serious cardiovascular risks for perioperative complications:   - No serious cardiac risks = 0 points     RCRI Interpretation: 0 points: Class I (very low risk - 0.4% complication rate)           Signed Electronically by: Ashwini Jessica MD  Copy of this evaluation report is provided to requesting physician.      "

## 2023-05-02 NOTE — H&P (VIEW-ONLY)
[facility]  [address]  [place] [address]  Phone: [phone]  Primary Provider: Ashwini Jessica  Pre-op Performing Provider: ASHWINI JESSICA      PREOPERATIVE EVALUATION:  Today's date: 5/2/2023    Edis Burroughs is a 57 year old male who presents for a preoperative evaluation.      5/2/2023     4:44 PM   Additional Questions   Roomed by Jyoti WHITESIDE MA   Accompanied by No one         5/2/2023     4:44 PM   Patient Reported Additional Medications   Patient reports taking the following new medications None     Surgical Information:  Surgery/Procedure: Robot- assisted Laparoscopic Rt. Inguinal Hernia Repair   Surgery Location: [place]  Surgeon:   Surgery Date: 05/22/2023  Time of Surgery: 7:30am  Where patient plans to recover: At home with family  Fax number for surgical facility: Note does not need to be faxed, will be available electronically in Epic.    Assessment & Plan     The proposed surgical procedure is considered INTERMEDIATE risk.    (Z01.818) Preoperative examination  (primary encounter diagnosis)  Comment: No absolute contraindications to surgery.  Plan: May proceed as scheduled.    (K40.90) Right inguinal hernia  Comment: Agree with surgical repair.  Plan: May proceed as scheduled.    (I10) Hypertension, goal below 140/90  Comment: Elevated, second reading high normal but within guidelines.  Tolerating 2 drug regimen, will check renal function and hemoglobin.  Plan: CBC with platelets, Basic metabolic panel  (Ca,        Cl, CO2, Creat, Gluc, K, Na, BUN)            (F31.9) Bipolar affective disorder, remission status unspecified (H)  Comment: Appears to be doing well with current medications, followed by psychiatry.  Patient does have help at home for postoperative recovery.  Plan: Should not affect surgery.       - No identified additional risk factors other than previously addressed    Antiplatelet or Anticoagulation Medication Instructions:    - Patient is on no antiplatelet or anticoagulation medications.    Additional Medication Instructions:  Patient is to take all scheduled medications on the day of surgery EXCEPT for modifications listed below:   - ACE/ARB: HOLD on day of surgery (minimum 11 hours for general anesthesia).    RECOMMENDATION:  APPROVAL GIVEN to proceed with proposed procedure, without further diagnostic evaluation.      Subjective     HPI related to upcoming procedure: Asked to give medical clearance for upcoming hernia surgery which involve general anesthesia.  No absolute contraindications.        5/2/2023     4:38 PM   Preop Questions   1. Have you ever had a heart attack or stroke? No   2. Have you ever had surgery on your heart or blood vessels, such as a stent placement, a coronary artery bypass, or surgery on an artery in your head, neck, heart, or legs? No   3. Do you have chest pain with activity? No   4. Do you have a history of  heart failure? No   5. Do you currently have a cold, bronchitis or symptoms of other infection? No   6. Do you have a cough, shortness of breath, or wheezing? No   7. Do you or anyone in your family have previous history of blood clots? UNKNOWN -    8. Do you or does anyone in your family have a serious bleeding problem such as prolonged bleeding following surgeries or cuts? No   9. Have you ever had problems with anemia or been told to take iron pills? No   10. Have you had any abnormal blood loss such as black, tarry or bloody stools? No   11. Have you ever had a blood transfusion? No   12. Are you willing to have a blood transfusion if it is medically needed before, during, or after your surgery? Yes   13. Have you or any of your relatives ever had problems with anesthesia? UNKNOWN -    14. Do you have sleep apnea, excessive snoring or daytime drowsiness? UNKNOWN -   15. Do you have any artifical heart valves or other implanted medical devices like a pacemaker, defibrillator, or continuous glucose  monitor? No   16. Do you have artificial joints? No   17. Are you allergic to latex? No       Health Care Directive:  Patient does not have a Health Care Directive or Living Will:     Preoperative Review of :   reviewed - no record of controlled substances prescribed.      Status of Chronic Conditions:  See problem list for active medical problems.  Problems all longstanding and stable, except as noted/documented.  See ROS for pertinent symptoms related to these conditions.      Review of Systems  Constitutional, neuro, ENT, endocrine, pulmonary, cardiac, gastrointestinal, genitourinary, musculoskeletal, integument and psychiatric systems are negative, except as otherwise noted.    Patient Active Problem List    Diagnosis Date Noted     Tubular adenoma of colon 06/28/2019     Priority: Medium     Hypertension, goal below 140/90 03/12/2018     Priority: Medium     Bipolar affective disorder, remission status unspecified (H) 11/22/2016     Priority: Medium      Past Medical History:   Diagnosis Date     Bipolar disorder (H)      Hypertension      Left inguinal hernia 1/15/2020    Added automatically from request for surgery [id number]     Past Surgical History:   Procedure Laterality Date     GENITOURINARY SURGERY       LAPAROSCOPIC HERNIORRHAPHY INGUINAL BILATERAL Bilateral 2/11/2020    Procedure: HERNIORRHAPHY, INGUINAL, left LAPAROSCOPIC;  Surgeon: Han Ang MD;  Location: [facility] OR     [facility] APPENDECTOMY  1997     [facility] LAMINECTOMY,FACETECTOMY,LUMBAR  2008     Current Outpatient Medications   Medication Sig Dispense Refill     divalproex (DEPAKOTE) 500 MG 24 hr tablet Take 500 mg by mouth daily.       LamoTRIgine 300 MG TB24 One daily       losartan-hydrochlorothiazide (HYZAAR) 100-12.5 MG tablet TAKE ONE TABLET BY MOUTH ONCE DAILY 90 tablet 2     QUEtiapine (SEROQUEL) 300 MG tablet Take 600 mg by mouth At Bedtime TID qhs       senna (SENOKOT) 8.6 MG tablet Take 1 tablet by mouth daily       ZOLOFT 100 MG OR TABS  "2 TABLET DAILY 60 Tab 1       Allergies   Allergen Reactions     Vicodin [Acetaminophen] Itching, Rash and GI Disturbance     Stomach discomfort        Social History     Tobacco Use     Smoking status: Never     Smokeless tobacco: Never   Vaping Use     Vaping status: Never Used   Substance Use Topics     Alcohol use: Yes     Alcohol/week: 0.0 standard drinks of alcohol     Comment: rarely       History   Drug Use No         Objective     /88   Pulse 93   Temp 97.3  F (36.3  C) (Temporal)   Resp 20   Ht 1.7 m (5' 6.93\")   Wt 81.6 kg (180 lb)   SpO2 98%   BMI 28.25 kg/m      Physical Exam  GENERAL: Healthy, alert and no distress  EYES: Eyes grossly normal to inspection, conjunctivae and sclerae normal  RESP: Lungs clear to auscultation - no rales, rhonchi or wheezes  CV: Regular rate and rhythm, normal S1 S2, no murmur  MS: No gross musculoskeletal defects noted, no edema  NEURO: Normal strength and tone, mentation intact and speech normal  PSYCH: Mentation appears normal, affect normal/bright     Recent Labs   Lab Test 05/25/22  1544      POTASSIUM 3.9   CR 1.00        Diagnostics:  No results found for this or any previous visit (from the past 24 hour(s)).   No EKG required, no history of coronary heart disease, significant arrhythmia, peripheral arterial disease or other structural heart disease.    Revised Cardiac Risk Index (RCRI):  The patient has the following serious cardiovascular risks for perioperative complications:   - No serious cardiac risks = 0 points     RCRI Interpretation: 0 points: Class I (very low risk - 0.4% complication rate)           Signed Electronically by: Ashwini Jessica MD  Copy of this evaluation report is provided to requesting physician.      "

## 2023-05-21 ENCOUNTER — ANESTHESIA EVENT (OUTPATIENT)
Dept: SURGERY | Facility: CLINIC | Age: 58
End: 2023-05-21
Payer: COMMERCIAL

## 2023-05-22 ENCOUNTER — HOSPITAL ENCOUNTER (OUTPATIENT)
Facility: CLINIC | Age: 58
Discharge: HOME | End: 2023-05-22
Attending: SURGERY | Admitting: SURGERY
Payer: COMMERCIAL

## 2023-05-22 ENCOUNTER — ANESTHESIA (OUTPATIENT)
Dept: SURGERY | Facility: CLINIC | Age: 58
End: 2023-05-22
Payer: COMMERCIAL

## 2023-05-22 VITALS
BODY MASS INDEX: 28.25 KG/M2 | HEART RATE: 74 BPM | SYSTOLIC BLOOD PRESSURE: 126 MMHG | TEMPERATURE: 97.2 F | HEIGHT: 67 IN | WEIGHT: 180 LBS | OXYGEN SATURATION: 93 % | RESPIRATION RATE: 16 BRPM | DIASTOLIC BLOOD PRESSURE: 89 MMHG

## 2023-05-22 DIAGNOSIS — Z87.19 S/P LAPAROSCOPIC HERNIA REPAIR: Primary | ICD-10-CM

## 2023-05-22 DIAGNOSIS — Z98.890 S/P LAPAROSCOPIC HERNIA REPAIR: Primary | ICD-10-CM

## 2023-05-22 PROCEDURE — 250N000013 HC RX MED GY IP 250 OP 250 PS 637: Performed by: NURSE ANESTHETIST, CERTIFIED REGISTERED

## 2023-05-22 PROCEDURE — 49650 LAP ING HERNIA REPAIR INIT: CPT | Mod: RT | Performed by: SURGERY

## 2023-05-22 PROCEDURE — 250N000011 HC RX IP 250 OP 636: Performed by: SURGERY

## 2023-05-22 PROCEDURE — 250N000009 HC RX 250: Performed by: NURSE ANESTHETIST, CERTIFIED REGISTERED

## 2023-05-22 PROCEDURE — 272N000001 HC OR GENERAL SUPPLY STERILE: Performed by: SURGERY

## 2023-05-22 PROCEDURE — 258N000003 HC RX IP 258 OP 636: Performed by: NURSE ANESTHETIST, CERTIFIED REGISTERED

## 2023-05-22 PROCEDURE — 360N000080 HC SURGERY LEVEL 7, PER MIN: Performed by: SURGERY

## 2023-05-22 PROCEDURE — 999N000141 HC STATISTIC PRE-PROCEDURE NURSING ASSESSMENT: Performed by: SURGERY

## 2023-05-22 PROCEDURE — 250N000025 HC SEVOFLURANE, PER MIN: Performed by: SURGERY

## 2023-05-22 PROCEDURE — C1781 MESH (IMPLANTABLE): HCPCS | Performed by: SURGERY

## 2023-05-22 PROCEDURE — 710N000012 HC RECOVERY PHASE 2, PER MINUTE: Performed by: SURGERY

## 2023-05-22 PROCEDURE — 370N000017 HC ANESTHESIA TECHNICAL FEE, PER MIN: Performed by: SURGERY

## 2023-05-22 PROCEDURE — 710N000010 HC RECOVERY PHASE 1, LEVEL 2, PER MIN: Performed by: SURGERY

## 2023-05-22 PROCEDURE — 250N000009 HC RX 250: Performed by: SURGERY

## 2023-05-22 PROCEDURE — S2900 ROBOTIC SURGICAL SYSTEM: HCPCS | Performed by: SURGERY

## 2023-05-22 PROCEDURE — 250N000011 HC RX IP 250 OP 636: Performed by: NURSE ANESTHETIST, CERTIFIED REGISTERED

## 2023-05-22 DEVICE — MESH DEXTILE ANATOMICAL 15CM X 10CM LG RIGHT DXT1510AR: Type: IMPLANTABLE DEVICE | Site: ABDOMEN | Status: FUNCTIONAL

## 2023-05-22 RX ORDER — HYDROMORPHONE HYDROCHLORIDE 1 MG/ML
0.5 INJECTION, SOLUTION INTRAMUSCULAR; INTRAVENOUS; SUBCUTANEOUS EVERY 5 MIN PRN
Status: DISCONTINUED | OUTPATIENT
Start: 2023-05-22 | End: 2023-05-22 | Stop reason: HOSPADM

## 2023-05-22 RX ORDER — LIDOCAINE HYDROCHLORIDE 20 MG/ML
INJECTION, SOLUTION INFILTRATION; PERINEURAL PRN
Status: DISCONTINUED | OUTPATIENT
Start: 2023-05-22 | End: 2023-05-22

## 2023-05-22 RX ORDER — PROPOFOL 10 MG/ML
INJECTION, EMULSION INTRAVENOUS PRN
Status: DISCONTINUED | OUTPATIENT
Start: 2023-05-22 | End: 2023-05-22

## 2023-05-22 RX ORDER — FENTANYL CITRATE 50 UG/ML
50 INJECTION, SOLUTION INTRAMUSCULAR; INTRAVENOUS
Status: DISCONTINUED | OUTPATIENT
Start: 2023-05-22 | End: 2023-05-22 | Stop reason: HOSPADM

## 2023-05-22 RX ORDER — SODIUM CHLORIDE, SODIUM LACTATE, POTASSIUM CHLORIDE, CALCIUM CHLORIDE 600; 310; 30; 20 MG/100ML; MG/100ML; MG/100ML; MG/100ML
INJECTION, SOLUTION INTRAVENOUS CONTINUOUS
Status: DISCONTINUED | OUTPATIENT
Start: 2023-05-22 | End: 2023-05-22 | Stop reason: HOSPADM

## 2023-05-22 RX ORDER — KETOROLAC TROMETHAMINE 30 MG/ML
INJECTION, SOLUTION INTRAMUSCULAR; INTRAVENOUS PRN
Status: DISCONTINUED | OUTPATIENT
Start: 2023-05-22 | End: 2023-05-22

## 2023-05-22 RX ORDER — ONDANSETRON 2 MG/ML
INJECTION INTRAMUSCULAR; INTRAVENOUS PRN
Status: DISCONTINUED | OUTPATIENT
Start: 2023-05-22 | End: 2023-05-22

## 2023-05-22 RX ORDER — MEPERIDINE HYDROCHLORIDE 25 MG/ML
12.5 INJECTION INTRAMUSCULAR; INTRAVENOUS; SUBCUTANEOUS EVERY 5 MIN PRN
Status: DISCONTINUED | OUTPATIENT
Start: 2023-05-22 | End: 2023-05-22 | Stop reason: HOSPADM

## 2023-05-22 RX ORDER — ONDANSETRON 2 MG/ML
4 INJECTION INTRAMUSCULAR; INTRAVENOUS EVERY 30 MIN PRN
Status: DISCONTINUED | OUTPATIENT
Start: 2023-05-22 | End: 2023-05-22 | Stop reason: HOSPADM

## 2023-05-22 RX ORDER — OXYCODONE HYDROCHLORIDE 5 MG/1
5-10 TABLET ORAL EVERY 4 HOURS PRN
Qty: 12 TABLET | Refills: 0 | Status: SHIPPED | OUTPATIENT
Start: 2023-05-22 | End: 2023-05-25

## 2023-05-22 RX ORDER — CEFAZOLIN SODIUM/WATER 2 G/20 ML
2 SYRINGE (ML) INTRAVENOUS
Status: COMPLETED | OUTPATIENT
Start: 2023-05-22 | End: 2023-05-22

## 2023-05-22 RX ORDER — FENTANYL CITRATE 50 UG/ML
50 INJECTION, SOLUTION INTRAMUSCULAR; INTRAVENOUS EVERY 5 MIN PRN
Status: DISCONTINUED | OUTPATIENT
Start: 2023-05-22 | End: 2023-05-22 | Stop reason: HOSPADM

## 2023-05-22 RX ORDER — BUPIVACAINE HYDROCHLORIDE AND EPINEPHRINE 2.5; 5 MG/ML; UG/ML
INJECTION, SOLUTION EPIDURAL; INFILTRATION; INTRACAUDAL; PERINEURAL PRN
Status: DISCONTINUED | OUTPATIENT
Start: 2023-05-22 | End: 2023-05-22 | Stop reason: HOSPADM

## 2023-05-22 RX ORDER — ONDANSETRON 4 MG/1
4 TABLET, ORALLY DISINTEGRATING ORAL EVERY 30 MIN PRN
Status: DISCONTINUED | OUTPATIENT
Start: 2023-05-22 | End: 2023-05-22 | Stop reason: HOSPADM

## 2023-05-22 RX ORDER — DEXAMETHASONE SODIUM PHOSPHATE 10 MG/ML
INJECTION, SOLUTION INTRAMUSCULAR; INTRAVENOUS PRN
Status: DISCONTINUED | OUTPATIENT
Start: 2023-05-22 | End: 2023-05-22

## 2023-05-22 RX ORDER — LIDOCAINE 40 MG/G
CREAM TOPICAL
Status: DISCONTINUED | OUTPATIENT
Start: 2023-05-22 | End: 2023-05-22 | Stop reason: HOSPADM

## 2023-05-22 RX ORDER — DIMENHYDRINATE 50 MG/ML
25 INJECTION, SOLUTION INTRAMUSCULAR; INTRAVENOUS
Status: DISCONTINUED | OUTPATIENT
Start: 2023-05-22 | End: 2023-05-22 | Stop reason: HOSPADM

## 2023-05-22 RX ORDER — OXYCODONE HYDROCHLORIDE 5 MG/1
10 TABLET ORAL
Status: DISCONTINUED | OUTPATIENT
Start: 2023-05-22 | End: 2023-05-22 | Stop reason: HOSPADM

## 2023-05-22 RX ORDER — CEFAZOLIN SODIUM/WATER 2 G/20 ML
2 SYRINGE (ML) INTRAVENOUS SEE ADMIN INSTRUCTIONS
Status: DISCONTINUED | OUTPATIENT
Start: 2023-05-22 | End: 2023-05-22 | Stop reason: HOSPADM

## 2023-05-22 RX ORDER — HYDROMORPHONE HYDROCHLORIDE 1 MG/ML
0.25 INJECTION, SOLUTION INTRAMUSCULAR; INTRAVENOUS; SUBCUTANEOUS EVERY 5 MIN PRN
Status: DISCONTINUED | OUTPATIENT
Start: 2023-05-22 | End: 2023-05-22 | Stop reason: HOSPADM

## 2023-05-22 RX ORDER — PROPOFOL 10 MG/ML
INJECTION, EMULSION INTRAVENOUS CONTINUOUS PRN
Status: DISCONTINUED | OUTPATIENT
Start: 2023-05-22 | End: 2023-05-22

## 2023-05-22 RX ORDER — FENTANYL CITRATE 50 UG/ML
25 INJECTION, SOLUTION INTRAMUSCULAR; INTRAVENOUS EVERY 5 MIN PRN
Status: DISCONTINUED | OUTPATIENT
Start: 2023-05-22 | End: 2023-05-22 | Stop reason: HOSPADM

## 2023-05-22 RX ORDER — FENTANYL CITRATE 50 UG/ML
INJECTION, SOLUTION INTRAMUSCULAR; INTRAVENOUS PRN
Status: DISCONTINUED | OUTPATIENT
Start: 2023-05-22 | End: 2023-05-22

## 2023-05-22 RX ORDER — OXYCODONE HYDROCHLORIDE 5 MG/1
5 TABLET ORAL
Status: COMPLETED | OUTPATIENT
Start: 2023-05-22 | End: 2023-05-22

## 2023-05-22 RX ADMIN — LIDOCAINE HYDROCHLORIDE 50 MG: 20 INJECTION, SOLUTION INFILTRATION; PERINEURAL at 07:42

## 2023-05-22 RX ADMIN — PROPOFOL 210 MG: 10 INJECTION, EMULSION INTRAVENOUS at 07:35

## 2023-05-22 RX ADMIN — ONDANSETRON 4 MG: 2 INJECTION INTRAMUSCULAR; INTRAVENOUS at 09:19

## 2023-05-22 RX ADMIN — PROPOFOL 100 MCG/KG/MIN: 10 INJECTION, EMULSION INTRAVENOUS at 07:38

## 2023-05-22 RX ADMIN — FENTANYL CITRATE 50 MCG: 50 INJECTION, SOLUTION INTRAMUSCULAR; INTRAVENOUS at 09:12

## 2023-05-22 RX ADMIN — FENTANYL CITRATE 50 MCG: 50 INJECTION, SOLUTION INTRAMUSCULAR; INTRAVENOUS at 08:19

## 2023-05-22 RX ADMIN — SODIUM CHLORIDE, POTASSIUM CHLORIDE, SODIUM LACTATE AND CALCIUM CHLORIDE: 600; 310; 30; 20 INJECTION, SOLUTION INTRAVENOUS at 08:46

## 2023-05-22 RX ADMIN — FENTANYL CITRATE 50 MCG: 50 INJECTION, SOLUTION INTRAMUSCULAR; INTRAVENOUS at 07:33

## 2023-05-22 RX ADMIN — LIDOCAINE HYDROCHLORIDE 0.2 ML: 10 INJECTION, SOLUTION EPIDURAL; INFILTRATION; INTRACAUDAL; PERINEURAL at 06:36

## 2023-05-22 RX ADMIN — DEXMEDETOMIDINE HYDROCHLORIDE 10 MCG: 100 INJECTION, SOLUTION INTRAVENOUS at 07:27

## 2023-05-22 RX ADMIN — SUGAMMADEX 200 MG: 100 INJECTION, SOLUTION INTRAVENOUS at 09:25

## 2023-05-22 RX ADMIN — MIDAZOLAM 1 MG: 1 INJECTION INTRAMUSCULAR; INTRAVENOUS at 07:27

## 2023-05-22 RX ADMIN — SODIUM CHLORIDE, POTASSIUM CHLORIDE, SODIUM LACTATE AND CALCIUM CHLORIDE 100 ML/HR: 600; 310; 30; 20 INJECTION, SOLUTION INTRAVENOUS at 06:36

## 2023-05-22 RX ADMIN — FENTANYL CITRATE 50 MCG: 50 INJECTION, SOLUTION INTRAMUSCULAR; INTRAVENOUS at 07:27

## 2023-05-22 RX ADMIN — KETOROLAC TROMETHAMINE 30 MG: 30 INJECTION, SOLUTION INTRAMUSCULAR at 09:16

## 2023-05-22 RX ADMIN — OXYCODONE HYDROCHLORIDE 5 MG: 5 TABLET ORAL at 10:47

## 2023-05-22 RX ADMIN — DEXAMETHASONE SODIUM PHOSPHATE 10 MG: 10 INJECTION, SOLUTION INTRAMUSCULAR; INTRAVENOUS at 07:42

## 2023-05-22 RX ADMIN — Medication 2 G: at 07:27

## 2023-05-22 ASSESSMENT — ACTIVITIES OF DAILY LIVING (ADL)
ADLS_ACUITY_SCORE: 35

## 2023-05-22 NOTE — ANESTHESIA POSTPROCEDURE EVALUATION
Patient: Edis Burroughs    Procedure: Procedure(s):  Robot-assisted laparoscopic right inguinal hernia repair with mesh       Anesthesia Type:  General    Note:  Disposition: Outpatient   Postop Pain Control: Uneventful            Sign Out: Well controlled pain   PONV: No   Neuro/Psych: Uneventful            Sign Out: Acceptable/Baseline neuro status   Airway/Respiratory: Uneventful            Sign Out: Acceptable/Baseline resp. status   CV/Hemodynamics: Uneventful            Sign Out: Acceptable CV status   Other NRE: NONE   DID A NON-ROUTINE EVENT OCCUR? No    Event details/Postop Comments:  Pt was happy with anesthesia care.  No complications.  I will follow up with the pt if needed.           Last vitals:  Vitals Value Taken Time   /76 05/22/23 1000   Temp 97.34  F (36.3  C) 05/22/23 1004   Pulse 86 05/22/23 1004   Resp 12 05/22/23 1004   SpO2 94 % 05/22/23 1004   Vitals shown include unvalidated device data.    Electronically Signed By: KEENAN Ahmadi CRNA  May 22, 2023  11:37 AM

## 2023-05-22 NOTE — ANESTHESIA CARE TRANSFER NOTE
Patient: Edis Burroughs    Procedure: Procedure(s):  Robot-assisted laparoscopic right inguinal hernia repair with mesh       Diagnosis: Right inguinal hernia [K40.90]  Diagnosis Additional Information: No value filed.    Anesthesia Type:   General     Note:    Oropharynx: oropharynx clear of all foreign objects and spontaneously breathing  Level of Consciousness: drowsy  Oxygen Supplementation: face mask    Independent Airway: airway patency satisfactory and stable  Dentition: dentition unchanged  Vital Signs Stable: post-procedure vital signs reviewed and stable  Report to RN Given: handoff report given  Patient transferred to: PACU    Handoff Report: Identifed the Patient, Identified the Reponsible Provider, Reviewed the pertinent medical history, Discussed the surgical course, Reviewed Intra-OP anesthesia mangement and issues during anesthesia, Set expectations for post-procedure period and Allowed opportunity for questions and acknowledgement of understanding      Vitals:  Vitals Value Taken Time   BP     Temp     Pulse     Resp     SpO2         Electronically Signed By: KEENAN Ahmadi CRNA  May 22, 2023  9:34 AM

## 2023-05-22 NOTE — ANESTHESIA PREPROCEDURE EVALUATION
Anesthesia Pre-Procedure Evaluation    Patient: Edis Burroughs   MRN: [id number] : 1965        Procedure : Procedure(s):  Robot-assisted laparoscopic right inguinal hernia repair with mesh possible open          Past Medical History:   Diagnosis Date     Bipolar disorder (H)      Hypertension      Left inguinal hernia 1/15/2020    Added automatically from request for surgery [id number]      Past Surgical History:   Procedure Laterality Date     GENITOURINARY SURGERY       LAPAROSCOPIC HERNIORRHAPHY INGUINAL BILATERAL Bilateral 2020    Procedure: HERNIORRHAPHY, INGUINAL, left LAPAROSCOPIC;  Surgeon: Han Ang MD;  Location: [facility] APPENDECTOMY       [facility] LAMINECTOMY,FACETECTOMY,LUMBAR  2008      Allergies   Allergen Reactions     Vicodin [Acetaminophen] Itching, Rash and GI Disturbance     Stomach discomfort      Social History     Tobacco Use     Smoking status: Never     Smokeless tobacco: Never   Vaping Use     Vaping status: Never Used   Substance Use Topics     Alcohol use: Yes     Alcohol/week: 0.0 standard drinks of alcohol     Comment: rarely      Wt Readings from Last 1 Encounters:   23 81.6 kg (180 lb)        Anesthesia Evaluation   Pt has had prior anesthetic. Type: General.        ROS/MED HX  ENT/Pulmonary:  - neg pulmonary ROS     Neurologic:  - neg neurologic ROS     Cardiovascular:  - neg cardiovascular ROS   (+) hypertension-----Previous cardiac testing   Echo: Date: Results:    Stress Test: Date: Results:    ECG Reviewed: Date:  Results:  SR  Cath: Date: Results:      METS/Exercise Tolerance: >4 METS    Hematologic:  - neg hematologic  ROS     Musculoskeletal:  - neg musculoskeletal ROS     GI/Hepatic:  - neg GI/hepatic ROS     Renal/Genitourinary:  - neg Renal ROS     Endo:  - neg endo ROS     Psychiatric/Substance Use:     (+) psychiatric history bipolar     Infectious Disease:  - neg infectious disease ROS     Malignancy:  - neg malignancy ROS     Other:  -  neg other ROS          Physical Exam    Airway  airway exam normal      Mallampati: II   TM distance: > 3 FB   Neck ROM: full   Mouth opening: > 3 cm    Respiratory Devices and Support         Dental       (+) Completely normal teeth      Cardiovascular   cardiovascular exam normal       Rhythm and rate: regular and normal     Pulmonary   pulmonary exam normal        breath sounds clear to auscultation           OUTSIDE LABS:  CBC:   Lab Results   Component Value Date    WBC 6.6 04/21/2021    WBC 6.3 01/03/2020    HGB 16.0 04/21/2021    HGB 15.0 01/03/2020    HCT 46.6 04/21/2021    HCT 43.6 01/03/2020     04/21/2021     01/03/2020     BMP:   Lab Results   Component Value Date     05/25/2022     04/21/2021    POTASSIUM 3.9 05/25/2022    POTASSIUM 4.1 04/21/2021    CHLORIDE 109 05/25/2022    CHLORIDE 108 04/21/2021    CO2 27 05/25/2022    CO2 27 04/21/2021    BUN 15 05/25/2022    BUN 14 04/21/2021    CR 1.00 05/25/2022    CR 0.99 04/21/2021    GLC 92 05/25/2022    GLC 97 04/21/2021     COAGS: No results found for: PTT, INR, FIBR  POC: No results found for: BGM, HCG, HCGS  HEPATIC:   Lab Results   Component Value Date    ALBUMIN 4.1 05/25/2022    PROTTOTAL 7.0 05/25/2022    ALT 41 05/25/2022    AST 19 05/25/2022    ALKPHOS 84 05/25/2022    BILITOTAL 1.0 05/25/2022     OTHER:   Lab Results   Component Value Date    A1C 5.2 08/06/2018    LEIDY 9.1 05/25/2022    TSH 1.22 02/15/2017    T4 1.00 08/30/2010    T3 98 08/30/2010    CRP 6.2 05/08/2019    SED 6 05/08/2019       Anesthesia Plan    ASA Status:  2   NPO Status:  NPO Appropriate    Anesthesia Type: General.     - Airway: ETT   Induction: Intravenous, Propofol.   Maintenance: Balanced.        Consents    Anesthesia Plan(s) and associated risks, benefits, and realistic alternatives discussed. Questions answered and patient/representative(s) expressed understanding.    - Discussed:     - Discussed with:  Patient    Use of blood products  discussed: No .     Postoperative Care    Pain management: IV analgesics, Oral pain medications.   PONV prophylaxis: Ondansetron (or other 5HT-3), Dexamethasone or Solumedrol     Comments:    Other Comments: The risks and benefits of anesthesia, and the alternatives where applicable, have been discussed with the patient, and they wish to proceed.            KEENAN Ahmadi CRNA

## 2023-05-22 NOTE — INTERVAL H&P NOTE
"I have reviewed the surgical (or preoperative) H&P that is linked to this encounter, and examined the patient. There are no significant changes    Clinical Conditions Present on Arrival:  Clinically Significant Risk Factors Present on Admission                  # Overweight: Estimated body mass index is 28.25 kg/m  as calculated from the following:    Height as of this encounter: 1.7 m (5' 6.93\").    Weight as of this encounter: 81.6 kg (180 lb).       "

## 2023-05-22 NOTE — OP NOTE
Procedure Date:TD@     PROCEDURE:  Robot-assisted laparoscopic right inguinal hernia repair with mesh.     PREOPERATIVE DIAGNOSIS:  Right inguinal hernia.     POSTOPERATIVE DIAGNOSIS:  Right indirect inguinal hernia     SURGEON:  Javier Vinson DO     ASSISTANT:  none     ANESTHESIA:  General endotracheal anesthesia.     SPECIMENS:  None.     ESTIMATED BLOOD LOSS:  5 mL     COMPLICATIONS:  None immediately apparent.     OPERATIVE FINDINGS:  right indirect inguinal hernia, no hernia on the left.      INDICATIONS FOR PROCEDURE: The patient is a 57-year-old male whom I met in surgical clinic with complaints of painful right groin bulge.  They endorsed worsening symptoms with activity.  History was corroborated by physical exam, which found a right inguinal hernia.  We discussed the options, and ultimately I recommended robot-assisted laparoscopic right inguinal hernia repair with mesh, possible open.  After our informed discussion, we agreed to proceed with surgery.     DESCRIPTION OF PROCEDURE:  After the informed consent was obtained, the patient was brought from the preoperative holding area to the operating room and placed in the supine position.  Anesthesia was induced.  They were prepped and draped in the normal sterile fashion.  Timeout was performed.  After the correct patient and correct procedure were verified, we began by making an 8 mm incision supraumbilically.  Veress needle was inserted into the peritoneal cavity and the abdomen was insufflated to 15 mmHg.  Robotic trocar was placed in the intraperitoneal cavity.  Camera was inserted.  General survey of the abdomen revealed a right indirect inguinal hernia and no hernia on the left.  The patient was placed in Trendelenburg position.  We placed 2 additional 8 mm robotic trocars in the right and left mid abdomen under direct visualization.  We placed a right sided 15 x 10 cm Dextile mesh, as well as a 9-inch 2-0 Stratafix and 2-0 Vicryl sutures in the  peritoneal cavity as well.  The robot was then docked.  We used a  bipolar pro-grasper in the left arm and a monopolar scissors on the right arm.  We began by incising the peritoneum in the right lower quadrant. This incision was brought medial to lateral and blunt dissection with a minimal amount of electrocautery was used to develop the preperitoneal plane.  The hernia sac was grasped and retracted into the preperitoneal space while the cord structures were dissected away from the hernia sac. Once the hernia sac had been completely reduced, there was adequate space for mesh to lay flat. We placed the mesh in the preperitoneal space.  We secured the mesh into place using interrupted 2-0 Vicryl sutures, once above the pubic tubercle as well as two additional interrupted sutures flanking the inferior epigastric vessels.  The mesh appeared to be in good position.  We then reapproximated the peritoneum using 2-0 Stratafix suture in a running fashion.  We surveyed the operative field and no apparent complications were seen.  We then removed the two sutures with needles intact without issue. The robot was then undocked.  The ports were removed under direct visualization while the abdomen was desufflated.  The skin was instilled with 0.25% Marcaine with epinephrine for local anesthesia.  Skin was closed with inverted interrupted 4-0 Monocryl sutures and topical adhesive dressing was applied.  At the completion of the case, all instruments, needles and sponges were accounted for, after a correct count.  The patient was then awoken from anesthesia and brought to the recovery room in stable condition.     Javier Vinson DO, FACS

## 2023-05-22 NOTE — DISCHARGE INSTRUCTIONS
[facility]    Home Care Following Hernia Repair    Dr. Vinson    Hernia Type:  Inguinal    Care of the Incision:  Surgical glue was used, keep your incision dry for 24 hours.  Then you may shower, but don t submerge under water for at least 2 weeks.  Gently pat your incision dry with a freshly laundered towel.  Do not touch your incision with bare hands or pick at scabs.  Leave your incision open to air.  Cover it only if clothing rubs or irritates it.  Activity:  Gradually increase your activity.  Walk short distances several times each day and increase the distance as your strength allows.  To promote circulation, do not cross your legs while sitting.  No strenuous lifting or straining for 2-3 weeks.   Do not lift anything over 10-20 pounds until your doctor approves an increase.  Return to work will be determined by the type of work you do and should be discussed with your physician.  Do not drive or operate equipment while taking prescription pain medicines.  You may drive 1 week after surgery if you have stopped taking prescription pain medicines and are pain-free enough to react quickly and make an emergency stop if necessary.    Diet:  Return to the diet you were on before surgery.  Drink plenty of  water.  Avoid foods that cause constipation.    REMEMBER--most prescription pain pills cause constipation.  Walking, extra fluids, and increased fiber (fresh fruits and vegetables, etc.) are natural remedies for constipation.  You can also take mineral oil, 1-2 Tablespoons per day.  If still constipated you may try a stool softener such as Colace or Miralax.    Call Your Physician if You Have:  Redness, increased swelling or cloudy drainage from your incision.  A temperature of more than 101 degrees F.  Worsening pain in your incision not relieved by your prescription pain pills and/or a short rest.  Any questions or concerns about your recovery, please call Business hours  [phone]    After hours  [email or web address]) [phone] Nurse Advice Line (24 hours a day)    Follow-up Care:  If appointment not already made, make an appointment 2-3 weeks after your surgery.  Call [phone]  [facility] Same-Day [facility]   Adult Discharge Orders & Instructions     For 24 hours after surgery    Get plenty of rest.  A responsible adult must stay with you for at least 24 hours after you leave the hospital.   Do not drive or use heavy equipment.  If you have weakness or tingling, don't drive or use heavy equipment until this feeling goes away.  Do not drink alcohol.  Avoid strenuous or risky activities.  Ask for help when climbing stairs.   You may feel lightheaded.  If so, sit for a few minutes before standing.  Have someone help you get up.   You may have a slight fever. Call the doctor if your fever is over 100 F (37.7 C) (taken under the tongue) or lasts longer than 24 hours.  You may have a dry mouth, a sore throat, muscle aches or trouble sleeping.  These should go away after 24 hours.  Do not make important or legal decisions.  We don t expect you to have any problems from the surgery or treatment you had today. Just in case, here s what to do if you have pain, upset stomach (nausea), bleeding or infection:  Pain:  Take medicines your doctor has prescribed or over-the-counter medicine they have suggested. Resting and using ice packs can help, too. For surgery on an arm or leg, raise it on a pillow to ease swelling. Call your doctor if these methods don t work.  Copyright Srin[employer], Licensed under [employer]  Upset stomach (nausea):  Take anti-nausea medicine approved by your doctor. Drink clear liquids like apple juice, ginger ale, broth or 7-Up. Be sure to drink enough fluids. Rest can help, too. Move to normal foods when you re ready.   Bleeding:  In the first 24 hours, you may see a little blood on your dressing, about the size of a quarter. You don t need to  worry about this much blood, but if the blood spot keeps getting bigger:  Put pressure on the wound if you can, AND  Call your doctor.  Copyright [employer], Licensed under [employer]  Fever/Infection: Please call your doctor if you have any of these signs:  Redness  Swelling  Wound feels warm  Pain gets worse  Bad-smelling fluid leaks from wound  Fever or chills  Call your doctor for any of the followin.  It has been over 8 to 10 hours since surgery and you are still not able to urinate (pass water).    2.  Headache for over 24 hours.    3.  Numbness, tingling or weakness in your legs the day after surgery (if you had spinal anesthesia).    24 Hour Nurse advice line: [phone]

## 2023-05-31 ENCOUNTER — OFFICE VISIT (OUTPATIENT)
Dept: SURGERY | Facility: CLINIC | Age: 58
End: 2023-05-31
Payer: COMMERCIAL

## 2023-05-31 VITALS
WEIGHT: 180 LBS | DIASTOLIC BLOOD PRESSURE: 88 MMHG | TEMPERATURE: 97.6 F | SYSTOLIC BLOOD PRESSURE: 122 MMHG | HEIGHT: 67 IN | BODY MASS INDEX: 28.25 KG/M2

## 2023-05-31 DIAGNOSIS — Z87.19 S/P LAPAROSCOPIC HERNIA REPAIR: Primary | ICD-10-CM

## 2023-05-31 DIAGNOSIS — Z98.890 S/P LAPAROSCOPIC HERNIA REPAIR: Primary | ICD-10-CM

## 2023-05-31 PROCEDURE — 99024 POSTOP FOLLOW-UP VISIT: CPT | Performed by: SURGERY

## 2023-05-31 ASSESSMENT — PAIN SCALES - GENERAL: PAINLEVEL: NO PAIN (0)

## 2023-05-31 NOTE — LETTER
5/31/2023         RE: Edis Burroughs  [address]  [facility], [place] [address]        Dear Colleague,    Thank you for referring your patient, Edis Burroughs, to the [facility]. Please see a copy of my visit note below.    General Surgery Follow Up    Pt returns for follow up visit s/p robot right inguinal hernia repair with mesh    HPI:  Doing great.  No concerns.  No issues with bowels or bladder.      Past Medical History:   Diagnosis Date     Bipolar disorder (H)      Hypertension      Left inguinal hernia 1/15/2020    Added automatically from request for surgery [id number]       Past Surgical History:   Procedure Laterality Date     DAVINCI XI HERNIORRHAPHY INGUINAL Right 5/22/2023    Procedure: Robot-assisted laparoscopic right inguinal hernia repair with mesh;  Surgeon: Javier Vinson DO;  Location: [facility]     GENITOURINARY SURGERY       LAPAROSCOPIC HERNIORRHAPHY INGUINAL BILATERAL Bilateral 2/11/2020    Procedure: HERNIORRHAPHY, INGUINAL, left LAPAROSCOPIC;  Surgeon: Han Ang MD;  Location: [facility] OR     [facility] APPENDECTOMY  1997     [facility] LAMINECTOMY,FACETECTOMY,LUMBAR  2008       Social History     Socioeconomic History     Marital status:      Spouse name: Not on file     Number of children: Not on file     Years of education: Not on file     Highest education level: Not on file   Occupational History     Not on file   Tobacco Use     Smoking status: Never     Smokeless tobacco: Never   Vaping Use     Vaping status: Never Used   Substance and Sexual Activity     Alcohol use: Yes     Alcohol/week: 0.0 standard drinks of alcohol     Comment: rarely     Drug use: No     Sexual activity: Yes     Partners: Female   Other Topics Concern     Parent/sibling w/ CABG, MI or angioplasty before 65F 55M? No   Social History Narrative     Not on file     Social Determinants of Health     Financial Resource Strain: Not on file   Food Insecurity: Not on file   Transportation  "Needs: Not on file   Physical Activity: Not on file   Stress: Not on file   Social Connections: Not on file   Intimate Partner Violence: Not on file   Housing Stability: Not on file       Current Outpatient Medications   Medication Sig Dispense Refill     divalproex (DEPAKOTE) 500 MG 24 hr tablet Take 500 mg by mouth daily.       LamoTRIgine 300 MG TB24 One daily       losartan-hydrochlorothiazide (HYZAAR) 100-12.5 MG tablet TAKE ONE TABLET BY MOUTH ONCE DAILY 90 tablet 2     QUEtiapine (SEROQUEL) 300 MG tablet Take 600 mg by mouth At Bedtime TID qhs       senna (SENOKOT) 8.6 MG tablet Take 1 tablet by mouth daily       ZOLOFT 100 MG OR TABS 2 TABLET DAILY 60 Tab 1       Medications and history reviewed    Physical exam:  Vitals: /88 (BP Location: Right arm, Patient Position: Sitting, Cuff Size: Adult Regular)   Temp 97.6  F (36.4  C) (Temporal)   Ht 1.7 m (5' 6.93\")   Wt 81.6 kg (180 lb)   BMI 28.25 kg/m    BMI= Body mass index is 28.25 kg/m .    HEART: RRR, no new murmurs  LUNGS: CTAB, equal chest rise, good effort  ABD: soft, non tender, non distended  INCISIONS: Clean dry intact  EXT: [place], no deformities    PATHOLOGY:  None    Assessment:     ICD-10-CM    1. S/P laparoscopic hernia repair  Z98.890     Z87.19         Plan: Doing great.  Restrictions reviewed and questions answered.  Follow-up as needed.    20 minutes spent by me on the date of the encounter doing chart review, history and exam, documentation and further activities per the note    Javier Vinson, DO        Again, thank you for allowing me to participate in the care of your patient.        Sincerely,        Javier Vinson, DO    "

## 2023-05-31 NOTE — PROGRESS NOTES
General Surgery Follow Up    Pt returns for follow up visit s/p robot right inguinal hernia repair with mesh    HPI:  Doing great.  No concerns.  No issues with bowels or bladder.      Past Medical History:   Diagnosis Date     Bipolar disorder (H)      Hypertension      Left inguinal hernia 1/15/2020    Added automatically from request for surgery [id number]       Past Surgical History:   Procedure Laterality Date     DAVINCI XI HERNIORRHAPHY INGUINAL Right 5/22/2023    Procedure: Robot-assisted laparoscopic right inguinal hernia repair with mesh;  Surgeon: Javier Vinson DO;  Location: [facility]     GENITOURINARY SURGERY       LAPAROSCOPIC HERNIORRHAPHY INGUINAL BILATERAL Bilateral 2/11/2020    Procedure: HERNIORRHAPHY, INGUINAL, left LAPAROSCOPIC;  Surgeon: Han Ang MD;  Location: [facility]     [facility] APPENDECTOMY  1997     [facility] LAMINECTOMY,FACETECTOMY,LUMBAR  2008       Social History     Socioeconomic History     Marital status:      Spouse name: Not on file     Number of children: Not on file     Years of education: Not on file     Highest education level: Not on file   Occupational History     Not on file   Tobacco Use     Smoking status: Never     Smokeless tobacco: Never   Vaping Use     Vaping status: Never Used   Substance and Sexual Activity     Alcohol use: Yes     Alcohol/week: 0.0 standard drinks of alcohol     Comment: rarely     Drug use: No     Sexual activity: Yes     Partners: Female   Other Topics Concern     Parent/sibling w/ CABG, MI or angioplasty before 65F 55M? No   Social History Narrative     Not on file     Social Determinants of Health     Financial Resource Strain: Not on file   Food Insecurity: Not on file   Transportation Needs: Not on file   Physical Activity: Not on file   Stress: Not on file   Social Connections: Not on file   Intimate Partner Violence: Not on file   Housing Stability: Not on file       Current Outpatient Medications   Medication Sig Dispense Refill      "divalproex (DEPAKOTE) 500 MG 24 hr tablet Take 500 mg by mouth daily.       LamoTRIgine 300 MG TB24 One daily       losartan-hydrochlorothiazide (HYZAAR) 100-12.5 MG tablet TAKE ONE TABLET BY MOUTH ONCE DAILY 90 tablet 2     QUEtiapine (SEROQUEL) 300 MG tablet Take 600 mg by mouth At Bedtime TID qhs       senna (SENOKOT) 8.6 MG tablet Take 1 tablet by mouth daily       ZOLOFT 100 MG OR TABS 2 TABLET DAILY 60 Tab 1       Medications and history reviewed    Physical exam:  Vitals: /88 (BP Location: Right arm, Patient Position: Sitting, Cuff Size: Adult Regular)   Temp 97.6  F (36.4  C) (Temporal)   Ht 1.7 m (5' 6.93\")   Wt 81.6 kg (180 lb)   BMI 28.25 kg/m    BMI= Body mass index is 28.25 kg/m .    HEART: RRR, no new murmurs  LUNGS: CTAB, equal chest rise, good effort  ABD: soft, non tender, non distended  INCISIONS: Clean dry intact  EXT: [place], no deformities    PATHOLOGY:  None    Assessment:     ICD-10-CM    1. S/P laparoscopic hernia repair  Z98.890     Z87.19         Plan: Doing great.  Restrictions reviewed and questions answered.  Follow-up as needed.    20 minutes spent by me on the date of the encounter doing chart review, history and exam, documentation and further activities per the note    Javier Vinson, DO    "

## 2023-06-02 ENCOUNTER — HEALTH MAINTENANCE LETTER (OUTPATIENT)
Age: 58
End: 2023-06-02

## 2023-08-29 NOTE — NURSING NOTE
"Chief Complaint   Patient presents with     Hypertension       Initial /90  Pulse 76  Ht 5' 7\" (1.702 m)  Wt 214 lb (97.1 kg)  BMI 33.52 kg/m2 Estimated body mass index is 33.52 kg/(m^2) as calculated from the following:    Height as of this encounter: 5' 7\" (1.702 m).    Weight as of this encounter: 214 lb (97.1 kg).  Medication Reconciliation: complete  "
n/a

## 2024-04-11 ENCOUNTER — OFFICE VISIT (OUTPATIENT)
Dept: FAMILY MEDICINE | Facility: CLINIC | Age: 59
End: 2024-04-11
Payer: COMMERCIAL

## 2024-04-11 VITALS
RESPIRATION RATE: 18 BRPM | WEIGHT: 207 LBS | DIASTOLIC BLOOD PRESSURE: 82 MMHG | SYSTOLIC BLOOD PRESSURE: 130 MMHG | TEMPERATURE: 97.6 F | BODY MASS INDEX: 32.49 KG/M2 | HEART RATE: 95 BPM | OXYGEN SATURATION: 97 %

## 2024-04-11 DIAGNOSIS — F31.9 BIPOLAR AFFECTIVE DISORDER, REMISSION STATUS UNSPECIFIED (H): ICD-10-CM

## 2024-04-11 DIAGNOSIS — L82.0 INFLAMED SEBORRHEIC KERATOSIS: Primary | ICD-10-CM

## 2024-04-11 DIAGNOSIS — I10 HYPERTENSION, GOAL BELOW 140/90: ICD-10-CM

## 2024-04-11 PROCEDURE — 17000 DESTRUCT PREMALG LESION: CPT | Performed by: FAMILY MEDICINE

## 2024-04-11 PROCEDURE — 99213 OFFICE O/P EST LOW 20 MIN: CPT | Mod: 25 | Performed by: FAMILY MEDICINE

## 2024-04-11 ASSESSMENT — PATIENT HEALTH QUESTIONNAIRE - PHQ9
SUM OF ALL RESPONSES TO PHQ QUESTIONS 1-9: 19
10. IF YOU CHECKED OFF ANY PROBLEMS, HOW DIFFICULT HAVE THESE PROBLEMS MADE IT FOR YOU TO DO YOUR WORK, TAKE CARE OF THINGS AT HOME, OR GET ALONG WITH OTHER PEOPLE: EXTREMELY DIFFICULT
SUM OF ALL RESPONSES TO PHQ QUESTIONS 1-9: 19

## 2024-04-11 ASSESSMENT — PAIN SCALES - GENERAL: PAINLEVEL: NO PAIN (0)

## 2024-04-11 NOTE — PROGRESS NOTES
"  Assessment & Plan     (L82.0) Inflamed seborrheic keratosis  (primary encounter diagnosis)  Comment: Appears consistent with an inflamed seborrheic keratoses however could not exclude malignant or premalignant lesion.  Will treat with cryotherapy and see if lesion resolves.  Plan: DESTRUCT PREMALIGNANT LESION, FIRST        The lesion was frozen x 2 using liquid nitrogen.  Advise follow-up in 1 month if the lesion does not resolve, would consider biopsy.  If resolves, follow-up will be as needed.    (I10) Hypertension, goal below 140/90  Comment: Within guidelines.  Plan: Continue current medications.    (F31.9) Bipolar affective disorder, remission status unspecified (H)  Comment: Doing reasonably well on current medications.  Plan: Continue follow-up with psychiatry.    Patient Instructions   The skin spot is called a seborrheic keratosis. Harmless, not skin cancer or pre cancer.     Will freeze today. It will turn dark and fall off in about one week.     Schedule a physical for this summer. We'll review things, set up a colonoscopy, and do some blood tests.     Covid and flu shot this fall.            BMI  Estimated body mass index is 32.49 kg/m  as calculated from the following:    Height as of 5/31/23: 1.7 m (5' 6.93\").    Weight as of this encounter: 93.9 kg (207 lb).   Weight management plan: Discussed healthy diet and exercise guidelines        Brendon Randhawa is a 58 year old, presenting for the following health issues:  Mole      4/11/2024     4:17 PM   Additional Questions   Roomed by Paresh   Accompanied by Self         4/11/2024     4:17 PM   Patient Reported Additional Medications   Patient reports taking the following new medications N/A     History of Present Illness       Reason for visit:  Mole on my back    He eats 2-3 servings of fruits and vegetables daily.He consumes 4 sweetened beverage(s) daily.He exercises with enough effort to increase his heart rate 9 or less minutes per day.  He " exercises with enough effort to increase his heart rate 3 or less days per week.   He is taking medications regularly.           Review of Systems  Constitutional, HEENT, cardiovascular, pulmonary, gi and gu systems are negative, except as otherwise noted.      Objective    /82   Pulse 95   Temp 97.6  F (36.4  C) (Tympanic)   Resp 18   Wt 93.9 kg (207 lb)   SpO2 97%   BMI 32.49 kg/m    Body mass index is 32.49 kg/m .  Physical Exam   GENERAL: Healthy, alert and no distress  Skin: There is a raised, scaly, pigmented lesion, 1.5 cm the mid thoracic back.  EYES: Eyes grossly normal to inspection, conjunctivae and sclerae normal  RESP: Lungs clear to auscultation - no rales, rhonchi or wheezes  CV: Regular rate and rhythm, normal S1 S2, no murmur  MS: No gross musculoskeletal defects noted, no edema  NEURO: Normal strength and tone, mentation intact and speech normal  PSYCH: Mentation appears normal, affect normal/bright             Signed Electronically by: Ashwini Jessica MD

## 2024-04-11 NOTE — PATIENT INSTRUCTIONS
The skin spot is called a seborrheic keratosis. Harmless, not skin cancer or pre cancer.     Will freeze today. It will turn dark and fall off in about one week.     Schedule a physical for this summer. We'll review things, set up a colonoscopy, and do some blood tests.     Covid and flu shot this fall.

## 2024-06-23 ENCOUNTER — HEALTH MAINTENANCE LETTER (OUTPATIENT)
Age: 59
End: 2024-06-23

## 2024-08-28 ENCOUNTER — OFFICE VISIT (OUTPATIENT)
Dept: FAMILY MEDICINE | Facility: CLINIC | Age: 59
End: 2024-08-28
Payer: COMMERCIAL

## 2024-08-28 VITALS
HEART RATE: 79 BPM | SYSTOLIC BLOOD PRESSURE: 138 MMHG | TEMPERATURE: 98.2 F | DIASTOLIC BLOOD PRESSURE: 84 MMHG | BODY MASS INDEX: 31.55 KG/M2 | WEIGHT: 201 LBS | OXYGEN SATURATION: 98 % | HEIGHT: 67 IN

## 2024-08-28 DIAGNOSIS — E66.811 CLASS 1 OBESITY WITH SERIOUS COMORBIDITY AND BODY MASS INDEX (BMI) OF 31.0 TO 31.9 IN ADULT, UNSPECIFIED OBESITY TYPE: ICD-10-CM

## 2024-08-28 DIAGNOSIS — R73.09 ABNORMAL GLUCOSE: ICD-10-CM

## 2024-08-28 DIAGNOSIS — Z00.00 MEDICARE ANNUAL WELLNESS VISIT, SUBSEQUENT: Primary | ICD-10-CM

## 2024-08-28 DIAGNOSIS — Z13.6 CARDIOVASCULAR SCREENING; LDL GOAL LESS THAN 160: ICD-10-CM

## 2024-08-28 DIAGNOSIS — F31.9 BIPOLAR AFFECTIVE DISORDER, REMISSION STATUS UNSPECIFIED (H): ICD-10-CM

## 2024-08-28 DIAGNOSIS — I10 HYPERTENSION, GOAL BELOW 140/90: ICD-10-CM

## 2024-08-28 DIAGNOSIS — Z12.11 SCREEN FOR COLON CANCER: ICD-10-CM

## 2024-08-28 DIAGNOSIS — Z12.5 SCREENING FOR PROSTATE CANCER: ICD-10-CM

## 2024-08-28 LAB — HBA1C MFR BLD: 5.5 % (ref 0–5.6)

## 2024-08-28 PROCEDURE — G0103 PSA SCREENING: HCPCS | Performed by: FAMILY MEDICINE

## 2024-08-28 PROCEDURE — 99213 OFFICE O/P EST LOW 20 MIN: CPT | Mod: 25 | Performed by: FAMILY MEDICINE

## 2024-08-28 PROCEDURE — 36415 COLL VENOUS BLD VENIPUNCTURE: CPT | Performed by: FAMILY MEDICINE

## 2024-08-28 PROCEDURE — 83036 HEMOGLOBIN GLYCOSYLATED A1C: CPT | Performed by: FAMILY MEDICINE

## 2024-08-28 PROCEDURE — G0439 PPPS, SUBSEQ VISIT: HCPCS | Performed by: FAMILY MEDICINE

## 2024-08-28 PROCEDURE — 80053 COMPREHEN METABOLIC PANEL: CPT | Performed by: FAMILY MEDICINE

## 2024-08-28 PROCEDURE — 80061 LIPID PANEL: CPT | Performed by: FAMILY MEDICINE

## 2024-08-28 SDOH — HEALTH STABILITY: PHYSICAL HEALTH: ON AVERAGE, HOW MANY DAYS PER WEEK DO YOU ENGAGE IN MODERATE TO STRENUOUS EXERCISE (LIKE A BRISK WALK)?: 5 DAYS

## 2024-08-28 SDOH — HEALTH STABILITY: PHYSICAL HEALTH: ON AVERAGE, HOW MANY MINUTES DO YOU ENGAGE IN EXERCISE AT THIS LEVEL?: 30 MIN

## 2024-08-28 ASSESSMENT — SOCIAL DETERMINANTS OF HEALTH (SDOH): HOW OFTEN DO YOU GET TOGETHER WITH FRIENDS OR RELATIVES?: ONCE A WEEK

## 2024-08-28 NOTE — PROGRESS NOTES
Preventive Care Visit  [facility] ALVARADO Jessica MD, Family Medicine  Aug 28, 2024      Assessment & Plan     (Z00.00) Medicare annual wellness visit, subsequent  (primary encounter diagnosis)  Comment:  Reviewed the need for periodic healthcare exams and screenings.   Plan: REVIEW OF HEALTH MAINTENANCE PROTOCOL ORDERS        Advised yearly check ups.     (I10) Hypertension, goal below 140/90  Comment: high normal but within guideline using 2 drug regiment: ARB plus thiazide diuretic.  Plan: Comprehensive metabolic panel (BMP + Alb, Alk         Phos, ALT, AST, Total. Bili, TP)        Awiat test results, if normal.  Continue.    (F31.9) Bipolar affective disorder, remission status unspecified (H)  Comment: doing well with current medications.  Currently disabled secondary to his psychiatric diagnosis.  He lives with his son, but can live independently.  Plan: continue follow up with psychiatry.     (E66.9,  Z68.31) Class 1 obesity with serious comorbidity and body mass index (BMI) of 31.0 to 31.9 in adult, unspecified obesity type  Comment: reviewed lifestyle. Some weight gain may be secondary to psychiatric medications.   Plan: monitor.     (R73.09) Abnormal glucose  Comment: normal.   Plan: Hemoglobin A1c        Repeat yearly.     (Z12.5) Screening for prostate cancer  Plan: PSA, screen        Await test results     (Z13.6) CARDIOVASCULAR SCREENING; LDL GOAL LESS THAN 160  Plan: Lipid panel reflex to direct LDL Non-fasting        Await test results     (Z12.11) Screen for colon cancer  Plan: Colonoscopy Screening  Referral            Results for orders placed or performed in visit on 08/28/24   Hemoglobin A1c     Status: Normal   Result Value Ref Range    Hemoglobin A1C 5.5 0.0 - 5.6 %        Patient Instructions   Overall, you're doing well.     Colonoscopy time: [facility] will call you to coordinate your care as prescribed by the provider. If you don t hear from a  representative within 2 business days, please call [phone].     Wt Readings from Last 4 Encounters:   08/28/24 91.2 kg (201 lb)   04/11/24 93.9 kg (207 lb)   05/31/23 81.6 kg (180 lb)   05/22/23 81.6 kg (180 lb)       Blood tests today.     No change in medications.     Go to a pharmacy for the RSV shot when you turn 59.     This fall, the new covid booster and flu shot.     Yearly check up.     Do more, eat less.      Patient has been advised of split billing requirements and indicates understanding: Yes        Counseling  Appropriate preventive services were addressed with this patient via screening, questionnaire, or discussion as appropriate for fall prevention, nutrition, physical activity, Tobacco-use cessation, social engagement, weight loss and cognition.  Checklist reviewing preventive services available has been given to the patient.  Reviewed patient's diet, addressing concerns and/or questions.   The patient was instructed to see the dentist every 6 months.   Information on urinary incontinence and treatment options given to patient.       Work on weight loss  Regular exercise    Brendon Randhawa is a 58 year old, presenting for the following:  Physical        8/28/2024    10:41 AM   Additional Questions   Roomed by Audelia GARCIA         8/28/2024    10:41 AM   Patient Reported Additional Medications   Patient reports taking the following new medications None         Health Care Directive  Patient does not have a Health Care Directive or Living Will: Discussed advance care planning with patient; information given to patient to review.    HPI          8/28/2024   General Health   How would you rate your overall physical health? Good   Feel stress (tense, anxious, or unable to sleep) Very much      (!) STRESS CONCERN      8/28/2024   Nutrition   Diet: Regular (no restrictions)            8/28/2024   Exercise   Days per week of moderate/strenous exercise 5 days   Average minutes spent exercising at  this level 30 min            8/28/2024   Social Factors   Frequency of gathering with friends or relatives Once a week   Worry food won't last until get money to buy more No   Food not last or not have enough money for food? No   Do you have housing? (Housing is defined as stable permanent housing and does not include staying ouside in a car, in a tent, in an abandoned building, in an overnight shelter, or couch-surfing.) Yes   Are you worried about losing your housing? No   Lack of transportation? No   Unable to get utilities (heat,electricity)? No            8/28/2024   Fall Risk   Fallen 2 or more times in the past year? No   Trouble with walking or balance? No             8/28/2024   Activities of Daily Living- Home Safety   Needs help with the following daily activites None of the above   Safety concerns in the home None of the above            8/28/2024   Dental   Dentist two times every year? (!) NO            8/28/2024   Hearing Screening   Hearing concerns? None of the above            8/28/2024   Driving Risk Screening   Patient/family members have concerns about driving (!) DECLINE            8/28/2024   General Alertness/Fatigue Screening   Have you been more tired than usual lately? No            8/28/2024   Urinary Incontinence Screening   Bothered by leaking urine in past 6 months Yes            8/28/2024   TB Screening   Were you born outside of the [place]? Yes              Today's PHQ-2 Score:       8/28/2024    10:46 AM   PHQ-2 ( 1999 Pf[employer])   Q1: Little interest or pleasure in doing things 1   Q2: Feeling down, depressed or hopeless 1   PHQ-2 Score 2         8/28/2024   Substance Use   Alcohol more than 3/day or more than 7/wk No   Do you have a current opioid prescription? No   How severe/bad is pain from 1 to 10? 0/10 (No Pain)   Do you use any other substances recreationally? No        Social History     Tobacco Use    Smoking status: Never    Smokeless tobacco: Never   Vaping Use    Vaping  status: Never Used   Substance Use Topics    Alcohol use: Yes     Alcohol/week: 0.0 standard drinks of alcohol     Comment: rarely    Drug use: No             8/28/2024   One time HIV Screening   Previous HIV test? I don't know      Last PSA:   PSA   Date Value Ref Range Status   03/30/2021 0.99 0 - 4 ug/L Final     Comment:     Assay Method:  Chemiluminescence using Siemens Vista analyzer     Prostate Specific Antigen Screen   Date Value Ref Range Status   05/25/2022 0.88 0.00 - 4.00 ug/L Final     ASCVD Risk   The ASCVD Risk score (Barbara ALCARAZ, et al., 2019) failed to calculate for the following reasons:    Cannot find a previous HDL lab    Cannot find a previous total cholesterol lab            Reviewed and updated as needed this visit by Provider                    Labs reviewed in EPIC  Current providers sharing in care for this patient include:  Patient Care Team:  Ashwini Jessica MD as PCP - General  Ashwini Jessica MD as Assigned PCP  Javier Vinson DO as Assigned Surgical Provider    The following health maintenance items are reviewed in Epic and correct as of today:  Health Maintenance   Topic Date Due    ADVANCE CARE PLANNING  Never done    HIV SCREENING  Never done    MEDICARE ANNUAL WELLNESS VISIT  Never done    HEPATITIS B IMMUNIZATION (1 of 3 - 19+ 3-dose series) Never done    LIPID  08/06/2023    ANNUAL REVIEW OF HM ORDERS  05/02/2024    COLORECTAL CANCER SCREENING  06/25/2024    INFLUENZA VACCINE (1) 09/01/2024    GLUCOSE  05/25/2025    DTAP/TDAP/TD IMMUNIZATION (3 - Td or Tdap) 03/11/2029    HEPATITIS C SCREENING  Completed    PHQ-2 (once per calendar year)  Completed    ZOSTER IMMUNIZATION  Completed    COVID-19 Vaccine  Completed    Pneumococcal Vaccine: Pediatrics (0 to 5 Years) and At-Risk Patients (6 to 64 Years)  Aged Out    HPV IMMUNIZATION  Aged Out    MENINGITIS IMMUNIZATION  Aged Out    RSV MONOCLONAL ANTIBODY  Aged Out         Review of Systems  Constitutional,  "HEENT, cardiovascular, pulmonary, gi and gu systems are negative, except as otherwise noted.     Objective    Exam  /84 (BP Location: Left arm, Patient Position: Chair, Cuff Size: Adult Large)   Pulse 79   Temp 98.2  F (36.8  C) (Oral)   Ht 1.702 m (5' 7\")   Wt 91.2 kg (201 lb)   SpO2 98%   BMI 31.48 kg/m     Estimated body mass index is 31.48 kg/m  as calculated from the following:    Height as of this encounter: 1.702 m (5' 7\").    Weight as of this encounter: 91.2 kg (201 lb).    Physical Exam  GENERAL: Healthy, alert and no distress  EYES: Eyes grossly normal to inspection, conjunctivae and sclerae normal  RESP: Lungs clear to auscultation - no rales, rhonchi or wheezes  CV: Regular rate and rhythm, normal S1 S2, no murmur  MS: No gross musculoskeletal defects noted, no edema  NEURO: Normal strength and tone, mentation intact and speech normal  PSYCH: Mentation appears normal, affect normal/bright         8/28/2024   Mini Cog   Clock Draw Score 0 Abnormal   3 Item Recall 1 object recalled   Mini Cog Total Score 1                 Signed Electronically by: Ashwini Jessica MD    "

## 2024-08-28 NOTE — PATIENT INSTRUCTIONS
Overall, you're doing well.     Colonoscopy time: [facility] will call you to coordinate your care as prescribed by the provider. If you don t hear from a representative within 2 business days, please call [phone].     Wt Readings from Last 4 Encounters:   08/28/24 91.2 kg (201 lb)   04/11/24 93.9 kg (207 lb)   05/31/23 81.6 kg (180 lb)   05/22/23 81.6 kg (180 lb)       Blood tests today.     No change in medications.     Go to a pharmacy for the RSV shot when you turn 59.     This fall, the new covid booster and flu shot.     Yearly check up.     Do more, eat less.

## 2024-08-29 ENCOUNTER — PATIENT OUTREACH (OUTPATIENT)
Dept: GASTROENTEROLOGY | Facility: CLINIC | Age: 59
End: 2024-08-29
Payer: COMMERCIAL

## 2024-08-29 LAB
ALBUMIN SERPL BCG-MCNC: 4.2 G/DL (ref 3.5–5.2)
ALP SERPL-CCNC: 81 U/L (ref 40–150)
ALT SERPL W P-5'-P-CCNC: 26 U/L (ref 0–70)
ANION GAP SERPL CALCULATED.3IONS-SCNC: 11 MMOL/L (ref 7–15)
AST SERPL W P-5'-P-CCNC: 21 U/L (ref 0–45)
BILIRUB SERPL-MCNC: 1.2 MG/DL
BUN SERPL-MCNC: 12.4 MG/DL (ref 8–23)
CALCIUM SERPL-MCNC: 9.3 MG/DL (ref 8.8–10.4)
CHLORIDE SERPL-SCNC: 108 MMOL/L (ref 98–107)
CHOLEST SERPL-MCNC: 212 MG/DL
CREAT SERPL-MCNC: 1.01 MG/DL (ref 0.67–1.17)
EGFRCR SERPLBLD CKD-EPI 2021: 86 ML/MIN/1.73M2
FASTING STATUS PATIENT QL REPORTED: NO
FASTING STATUS PATIENT QL REPORTED: NO
GLUCOSE SERPL-MCNC: 74 MG/DL (ref 70–99)
HCO3 SERPL-SCNC: 23 MMOL/L (ref 22–29)
HDLC SERPL-MCNC: 49 MG/DL
LDLC SERPL CALC-MCNC: 142 MG/DL
NONHDLC SERPL-MCNC: 163 MG/DL
POTASSIUM SERPL-SCNC: 4.1 MMOL/L (ref 3.4–5.3)
PROT SERPL-MCNC: 6.7 G/DL (ref 6.4–8.3)
PSA SERPL DL<=0.01 NG/ML-MCNC: 0.91 NG/ML (ref 0–3.5)
SODIUM SERPL-SCNC: 142 MMOL/L (ref 135–145)
TRIGL SERPL-MCNC: 106 MG/DL

## 2025-03-13 ENCOUNTER — TELEPHONE (OUTPATIENT)
Dept: GASTROENTEROLOGY | Facility: CLINIC | Age: 60
End: 2025-03-13
Payer: COMMERCIAL

## 2025-03-13 NOTE — TELEPHONE ENCOUNTER
Endoscopy Scheduling Screen      What insurance is in the chart?  Other:  [facility]    Ordering/Referring Provider: Ashwini Jessica MD    (If ordering provider performs procedure, schedule with ordering provider unless otherwise instructed. )    BMI: There is no height or weight on file to calculate BMI.     Sedation Ordered  general anesthesia.   BMI<= 45 45 < BMI <= 48 48 < BMI < = 50  BMI > 50   No Restrictions No [facility]  No [facility]  [facility] with exceptions Hospital Only OR Only       Do you have a history of malignant hyperthermia?  NO    (Females) Are you currently pregnant?   NO     Are you currently on dialysis?   NO    Do you need assistance transferring?   NO    BMI: There is no height or weight on file to calculate BMI.     Is patients BMI > 50?  NO    BMI > 40?  NO    Do you have a diagnosis of diabetes?  NO    Do you take an Oral or Injectable medication for weight loss or diabetes (excluding insulin)?  NO    Do you take the medication Naltrexone?  NO    Do you take blood thinners?  NO    Prep   Are you currently have chronic kidney disease?  NO    Do you have a diagnosis of cystic fibrosis (CF)?  NO    On a regular basis do you go 3 -5 days between bowel movements?  NO    Preferred Pharmacy:    [facility] [place], [place] - [address]  [address] [address]  Phone: [phone] Fax: [phone]    [facility] - [place], [place]  [address]  [facility] [address]  Phone: [phone] Fax: [phone]    [id number] - [place], [place] - [address]  [address]  [facility] [address]  Phone: [phone] Fax: [phone]      Final Scheduling Details     Procedure scheduled  Colonoscopy    Surgeon:  Rhea    Date of procedure:  5/5/25     Location  [place] [facility] order.    What is your communication preference for Instructions and/or Bowel Prep?   Kuli Kulihart    Patient Reminders:    You will receive a call from a Nurse to review instructions  and health history.  This assessment must be completed prior to your procedure.  Failure to complete the Nurse assessment may result in the procedure being cancelled.       On the day of your procedure, please designate an adult(s) who can drive you home stay with you for the next 24 hours. The medicines used in the exam will make you sleepy. You will not be able to drive.       You cannot take public transportation, ride share services, or non-medical taxi service without a responsible caregiver.  Medical transport services are allowed with the requirement that a responsible caregiver will receive you at your destination.  We require that drivers and caregivers are confirmed prior to your procedure.

## 2025-05-01 ENCOUNTER — ANESTHESIA EVENT (OUTPATIENT)
Dept: GASTROENTEROLOGY | Facility: CLINIC | Age: 60
End: 2025-05-01
Payer: COMMERCIAL

## 2025-05-01 NOTE — ANESTHESIA PREPROCEDURE EVALUATION
[facility]  [place] INPATIENT ENCOUNTER  PHYSICAL MEDICINE AND REHABILITATION  DAILY PROGRESS NOTE           DATE:  1/19/2024    Patient Name:  Murali Ly  YOB: 1977  MRN:  [id number]     SUBJECTIVE     Taking pills with nursing.  Aware of possible discharge next week     OBJECTIVE     REVIEW OF SYSTEMS:  Review of Systems   Respiratory:  Negative for shortness of breath.        VITAL SIGNS:     Vital Last Value 24 Hour Range   Temperature 98.2 °F (36.8 °C) (01/19/24 0513) Temp  Min: 97.7 °F (36.5 °C)  Max: 98.6 °F (37 °C)   Pulse 74 (01/19/24 0903) Pulse  Min: 67  Max: 77   Respiratory 18 (01/19/24 0903) Resp  Min: 14  Max: 19   Non-Invasive  Blood Pressure (!) 143/82 (01/19/24 0903) BP  Min: 143/82  Max: 188/93   Pulse Oximetry 100 % (01/19/24 0903) SpO2  Min: 94 %  Max: 100 %     Vital Today Admitted   Weight 103.7 kg (228 lb 9.9 oz) (01/08/24 2025) Weight: 103.7 kg (228 lb 9.9 oz) (01/08/24 2025)   `  PVR: Bladder Scan  Reason for Scan: Absence of voiding (01/10/24 0300)  Bladder Scanned Volume (mL): 125 mL (01/10/24 0300)    PHYSICAL EXAMINATION:  Physical Exam  Constitutional:       General: He is not in acute distress.  Pulmonary:      Effort: Pulmonary effort is normal.   Psychiatric:         Mood and Affect: Mood normal.         Behavior: Behavior normal.         LABORATORY DATA:    Recent Labs   Lab 01/19/24  0653 01/15/24  0546 01/14/24  0831   WBC 12.3* 15.3* 11.5*   RBC 3.20* 2.84* 3.01*   HGB 9.1* 8.1* 8.6*   HCT 28.9* 25.7* 27.1*   MCV 90.3 90.5 90.0   MCH 28.4 28.5 28.6   MCHC 31.5* 31.5* 31.7*   RDWCV 16.0* 15.4* 15.4*   * 585* 575*         Recent Labs   Lab 01/15/24  0546 01/14/24  0831   SODIUM 134* 134*   CHLORIDE 94* 95*   BUN 44* 29*   POTASSIUM 4.4 4.1   GLUCOSE 110* 98   CREATININE 6.61* 5.32*   CALCIUM 9.2 9.5         Recent Labs   Lab 01/19/24  0605 01/18/24  2141 01/18/24  1933 01/18/24  1136 01/18/24  0510 01/17/24 2012 01/17/24  1713 01/17/24  1111   GLUB 96 178*  Anesthesia Pre-Procedure Evaluation    Patient: Edis Burroughs   MRN: [id number] : 1965        Procedure : Procedure(s):  Colonoscopy          Past Medical History:   Diagnosis Date    Bipolar disorder (H)     Hypertension     Left inguinal hernia 1/15/2020    Added automatically from request for surgery [id number]      Past Surgical History:   Procedure Laterality Date    DAVINCI XI HERNIORRHAPHY INGUINAL Right 2023    Procedure: Robot-assisted laparoscopic right inguinal hernia repair with mesh;  Surgeon: Javier Vinson DO;  Location: [facility] OR    GENITOURINARY SURGERY      LAPAROSCOPIC HERNIORRHAPHY INGUINAL BILATERAL Bilateral 2020    Procedure: HERNIORRHAPHY, INGUINAL, left LAPAROSCOPIC;  Surgeon: Han Ang MD;  Location: [facility] OR    [facility]      [facility] LAMINECTOMY,FACETECTOMY,LUMBAR  2008      Allergies   Allergen Reactions    Vicodin [Acetaminophen] Itching, Rash and GI Disturbance     Stomach discomfort      Social History     Tobacco Use    Smoking status: Never    Smokeless tobacco: Never   Substance Use Topics    Alcohol use: Yes     Alcohol/week: 0.0 standard drinks of alcohol     Comment: rarely      Wt Readings from Last 1 Encounters:   24 91.2 kg (201 lb)        Anesthesia Evaluation   Pt has had prior anesthetic.         ROS/MED HX  ENT/Pulmonary:  - neg pulmonary ROS     Neurologic:  - neg neurologic ROS     Cardiovascular:     (+)  hypertension- -   -  - -                                      METS/Exercise Tolerance:     Hematologic:  - neg hematologic  ROS     Musculoskeletal:  - neg musculoskeletal ROS     GI/Hepatic:  - neg GI/hepatic ROS     Renal/Genitourinary:  - neg Renal ROS     Endo:  - neg endo ROS     Psychiatric/Substance Use: Comment: Bipolar disorder     (+) psychiatric history bipolar       Infectious Disease:  - neg infectious disease ROS     Malignancy:  - neg malignancy ROS     Other:  - neg other ROS          Physical Exam    Airway   "airway exam normal      Mallampati: II   TM distance: > 3 FB   Neck ROM: full   Mouth opening: > 3 cm    Respiratory Devices and Support         Dental       (+) Minor Abnormalities - some fillings, tiny chips      Cardiovascular   cardiovascular exam normal       Rhythm and rate: regular and normal     Pulmonary   pulmonary exam normal        breath sounds clear to auscultation       OUTSIDE LABS:  CBC:   Lab Results   Component Value Date    WBC 6.6 04/21/2021    WBC 6.3 01/03/2020    HGB 16.0 04/21/2021    HGB 15.0 01/03/2020    HCT 46.6 04/21/2021    HCT 43.6 01/03/2020     04/21/2021     01/03/2020     BMP:   Lab Results   Component Value Date     08/28/2024     05/25/2022    POTASSIUM 4.1 08/28/2024    POTASSIUM 3.9 05/25/2022    CHLORIDE 108 (H) 08/28/2024    CHLORIDE 109 05/25/2022    CO2 23 08/28/2024    CO2 27 05/25/2022    BUN 12.4 08/28/2024    BUN 15 05/25/2022    CR 1.01 08/28/2024    CR 1.00 05/25/2022    GLC 74 08/28/2024    GLC 92 05/25/2022     COAGS: No results found for: \"PTT\", \"INR\", \"FIBR\"  POC: No results found for: \"BGM\", \"HCG\", \"HCGS\"  HEPATIC:   Lab Results   Component Value Date    ALBUMIN 4.2 08/28/2024    PROTTOTAL 6.7 08/28/2024    ALT 26 08/28/2024    AST 21 08/28/2024    ALKPHOS 81 08/28/2024    BILITOTAL 1.2 08/28/2024     OTHER:   Lab Results   Component Value Date    A1C 5.5 08/28/2024    LEIDY 9.3 08/28/2024    TSH 1.22 02/15/2017    T4 1.00 08/30/2010    T3 98 08/30/2010    CRP 6.2 05/08/2019    SED 6 05/08/2019       Anesthesia Plan    ASA Status:  3    NPO Status:  NPO Appropriate    Anesthesia Type: General.     - Airway: Native airway   Induction: Propofol.           Consents    Anesthesia Plan(s) and associated risks, benefits, and realistic alternatives discussed. Questions answered and patient/representative(s) expressed understanding.     - Discussed: Risks, Benefits and Alternatives for BOTH SEDATION and the PROCEDURE were discussed     - " 139* 116* 132* 117* 159* 105*       No results found      IMAGING STUDIES:   No results found.      Current Functional Status     Transfers       Sit to stand  contact guard/touching/steadying assist; with verbal cues    Stand to sit  contact guard/touching/steadying assist; with verbal cues    Stand pivot  contact guard/touching/steadying assist  intermittent cues for safe UE placement    Squat pivot  supervision  x3    Detailed transfers type  lateral    Assist level  supervision    Detailed transfers type 2  mat to wheelchair    Assist level  supervision          Gait and Wheelchair       Ambulation device  two-wheeled walker    Distance 1at trial  25    Assist level  total assist - non-dependent  Qasim x 1, w/c follow; cues for controlled speed and appropriate step length    Wheelchair type  manual    Level surfaces distance  room to isolation gym and back, no rest breaks    Assist level  stand by assist; with verbal cues          Self Cares       Oral hygiene  set up    Grooming  set up    Bathing  with verbal cues; stand by assist; contact guard/touching/steadying assist    Upper body dressing  set up    Lower body dressing  modified independent    Footwear  supervision    Toilet transfer  stand by assist    Shower transfer  minimal assist; with verbal cues               ASSESSMENT/PLAN     Pt is a 46 y.o. man s/p R BKA.     Rehab  - PT/OT/ Rehab RN.  Family to come in for training.  Will need help with stairs to get to dialysis       PAD s/p R BKA  - f/u with Dr Joseph for staple removal  -  and RRD ordered  - pre-prosthetic training, edema control, shaping and contracture prevention  - on aspirin and atorvastatin    - IV abx d/c'ed after right BKA on 1/3/2024     COVID+  - found on routine pre-admit testing  - contact and droplet isolation completed yesterday    Leukocytosis  - CTM, afebrile, COVID+  - last WBC 15K, afebrile, no s/s of infxn.  Down to 12.3 today     Pain  - on Norco prn (will decrease  Discussed with:  Patient            Postoperative Care    Pain management: Oral pain medications.   PONV prophylaxis: Background Propofol Infusion     Comments:               KEENAN Lennon CRNA    Clinically Significant Risk Factors Present on Admission                   # Hypertension: Noted on problem list                         to 5/325 and q6) and Gabapentin      Hallucinations  - resolved  - Reported by family, but pt denies currently  - d/w Dr Salinas who c/s'ed Neuro, no additional recs per their consult    CAD s/p CABG/Hypertensive Heart Disease with Chronic Diastolic HF  - on home labetalol 300 mg p.o. twice daily and lisinopril 40 mg daily and on increased home nifedipine xl 90mg po bid  - Resumed aspirin, statin     Diabetes with Nephropathy  - He is on 70/30 insulin 15 units twice a day at home  - on SSI for now with stable control       ESRD on HD T,TH,S  - Nephrology on consult for dialytic support.     Anxiety and Depression  - sertraline, alprazolam  - c/s psychology      GERD  - famotidine    DVT Proph  - hep sq

## 2025-05-05 ENCOUNTER — ANESTHESIA (OUTPATIENT)
Dept: GASTROENTEROLOGY | Facility: CLINIC | Age: 60
End: 2025-05-05
Payer: COMMERCIAL

## 2025-05-05 ENCOUNTER — HOSPITAL ENCOUNTER (OUTPATIENT)
Facility: CLINIC | Age: 60
Discharge: HOME | End: 2025-05-05
Attending: SURGERY | Admitting: SURGERY
Payer: COMMERCIAL

## 2025-05-05 VITALS
SYSTOLIC BLOOD PRESSURE: 134 MMHG | RESPIRATION RATE: 16 BRPM | TEMPERATURE: 97.6 F | OXYGEN SATURATION: 96 % | DIASTOLIC BLOOD PRESSURE: 92 MMHG | HEART RATE: 69 BPM

## 2025-05-05 LAB — COLONOSCOPY: NORMAL

## 2025-05-05 PROCEDURE — 258N000003 HC RX IP 258 OP 636: Performed by: NURSE ANESTHETIST, CERTIFIED REGISTERED

## 2025-05-05 PROCEDURE — 45385 COLONOSCOPY W/LESION REMOVAL: CPT | Performed by: SURGERY

## 2025-05-05 PROCEDURE — 250N000009 HC RX 250: Performed by: NURSE ANESTHETIST, CERTIFIED REGISTERED

## 2025-05-05 PROCEDURE — 370N000017 HC ANESTHESIA TECHNICAL FEE, PER MIN: Performed by: SURGERY

## 2025-05-05 PROCEDURE — 45380 COLONOSCOPY AND BIOPSY: CPT | Performed by: SURGERY

## 2025-05-05 PROCEDURE — 250N000011 HC RX IP 250 OP 636: Performed by: NURSE ANESTHETIST, CERTIFIED REGISTERED

## 2025-05-05 PROCEDURE — 250N000009 HC RX 250: Mod: JW | Performed by: NURSE ANESTHETIST, CERTIFIED REGISTERED

## 2025-05-05 PROCEDURE — 88305 TISSUE EXAM BY PATHOLOGIST: CPT | Mod: TC | Performed by: SURGERY

## 2025-05-05 RX ORDER — SODIUM CHLORIDE, SODIUM LACTATE, POTASSIUM CHLORIDE, CALCIUM CHLORIDE 600; 310; 30; 20 MG/100ML; MG/100ML; MG/100ML; MG/100ML
INJECTION, SOLUTION INTRAVENOUS CONTINUOUS
Status: DISCONTINUED | OUTPATIENT
Start: 2025-05-05 | End: 2025-05-05 | Stop reason: HOSPADM

## 2025-05-05 RX ORDER — PROPOFOL 10 MG/ML
INJECTION, EMULSION INTRAVENOUS CONTINUOUS PRN
Status: DISCONTINUED | OUTPATIENT
Start: 2025-05-05 | End: 2025-05-05

## 2025-05-05 RX ORDER — LIDOCAINE 40 MG/G
CREAM TOPICAL
Status: DISCONTINUED | OUTPATIENT
Start: 2025-05-05 | End: 2025-05-05 | Stop reason: HOSPADM

## 2025-05-05 RX ORDER — SODIUM CHLORIDE 9 MG/ML
INJECTION, SOLUTION INTRAVENOUS CONTINUOUS
Status: DISCONTINUED | OUTPATIENT
Start: 2025-05-05 | End: 2025-05-05 | Stop reason: HOSPADM

## 2025-05-05 RX ORDER — LIDOCAINE HYDROCHLORIDE 20 MG/ML
INJECTION, SOLUTION INFILTRATION; PERINEURAL PRN
Status: DISCONTINUED | OUTPATIENT
Start: 2025-05-05 | End: 2025-05-05

## 2025-05-05 RX ADMIN — LIDOCAINE HYDROCHLORIDE 60 MG: 20 INJECTION, SOLUTION INFILTRATION; PERINEURAL at 07:32

## 2025-05-05 RX ADMIN — LIDOCAINE HYDROCHLORIDE 0.1 ML: 10 INJECTION, SOLUTION EPIDURAL; INFILTRATION; INTRACAUDAL; PERINEURAL at 06:52

## 2025-05-05 RX ADMIN — SODIUM CHLORIDE, POTASSIUM CHLORIDE, SODIUM LACTATE AND CALCIUM CHLORIDE: 600; 310; 30; 20 INJECTION, SOLUTION INTRAVENOUS at 06:51

## 2025-05-05 RX ADMIN — PROPOFOL 200 MCG/KG/MIN: 10 INJECTION, EMULSION INTRAVENOUS at 07:32

## 2025-05-05 ASSESSMENT — ACTIVITIES OF DAILY LIVING (ADL)
ADLS_ACUITY_SCORE: 41
ADLS_ACUITY_SCORE: 41

## 2025-05-05 NOTE — ANESTHESIA CARE TRANSFER NOTE
Patient: Edis Burroughs    Procedure: Procedure(s):  COLONOSCOPY, FLEXIBLE, WITH LESION REMOVAL USING SNARE       Diagnosis: Screen for colon cancer [Z12.11]  Diagnosis Additional Information: No value filed.    Anesthesia Type:   General     Note:    Oropharynx: oropharynx clear of all foreign objects and spontaneously breathing  Level of Consciousness: drowsy  Oxygen Supplementation: room air    Independent Airway: airway patency satisfactory and stable  Dentition: dentition unchanged  Vital Signs Stable: post-procedure vital signs reviewed and stable  Report to RN Given: handoff report given  Patient transferred to: Phase II    Handoff Report: Identifed the Patient, Identified the Reponsible Provider, Reviewed the pertinent medical history, Discussed the surgical course, Reviewed Intra-OP anesthesia mangement and issues during anesthesia, Set expectations for post-procedure period and Allowed opportunity for questions and acknowledgement of understanding    Vitals:  Vitals Value Taken Time   /87 05/05/25 0800   Temp 36.4  C (97.6  F) 05/05/25 0800   Pulse 74 05/05/25 0800   Resp     SpO2 95 % 05/05/25 0803   Vitals shown include unfiled device data.    Electronically Signed By: KEENAN Marquez CRNA  May 5, 2025  8:04 AM

## 2025-05-05 NOTE — DISCHARGE INSTRUCTIONS
"Learning About Colonoscopy  What is a colonoscopy?     A colonoscopy is a test (also called a procedure) that lets a doctor look inside your large intestine. The doctor uses a thin, lighted tube called a colonoscope. The doctor uses it to look for small growths called polyps, colon or rectal cancer (colorectal cancer), or other problems like bleeding.  During the procedure, the doctor can take samples of tissue. The samples can then be checked for cancer or other conditions. The doctor can also take out polyps.  How is a colonoscopy done?  This procedure is done in a doctor's office or a clinic or hospital. You will get medicine to help you relax and not feel pain. Some people find that they don't remember having the test because of the medicine.  The doctor gently moves the colonoscope, or scope, through the colon. The scope is also a small video camera. It lets the doctor see the colon and take pictures.  How do you prepare for the procedure?  You need to clean out your colon before the procedure so the doctor can see your colon. This depends on which \"colon prep\" your doctor recommends.  To clean out your colon, you'll do a \"colon prep\" before the test. This means you stop eating solid foods and drink only clear liquids. You can have water, tea, coffee, clear juices, clear broths, flavored ice pops, and gelatin (such as Jell-O). Do not drink anything red or purple.  The day or night before the procedure, you drink a large amount of a special liquid. This causes loose, frequent stools. You will go to the bathroom a lot. Your doctor may have you drink part of the liquid the evening before and the rest on the day of the test. It's very important to drink all of the liquid. If you have problems drinking it, call your doctor.  Arrange to have someone take you home after the test.  What can you expect after a colonoscopy?  Your doctor will tell you when you can eat and do your usual activities.  Drink a lot of fluid " "after the test to replace the fluids you may have lost during the colon prep. But don't drink alcohol.  Your doctor will talk to you about when you'll need your next colonoscopy. The results of your test and your risk for colorectal cancer will help your doctor decide how often you need to be checked.  After the test, you may be bloated or have gas pains. You may need to pass gas. If a biopsy was done or a polyp was removed, you may have streaks of blood in your stool (feces) for a few days. Check with your doctor to see when it is safe to take aspirin and nonsteroidal anti-inflammatory drugs (NSAIDs) again.  Problems such as heavy rectal bleeding may not occur until several weeks after the test. This isn't common. But it can happen after polyps are removed.  Follow-up care is a key part of your treatment and safety. Be sure to make and go to all appointments, and call your doctor if you are having problems. It's also a good idea to know your test results and keep a list of the medicines you take.  Where can you learn more?  Go to [email or web address]/patiented  Enter Z368 in the search box to learn more about \"Learning About Colonoscopy.\"  Current as of: October 25, 2024  Content Version: 14.4    [id number] [employer].   Care instructions adapted under license by your healthcare professional. If you have questions about a medical condition or this instruction, always ask your healthcare professional. [employer] disclaims any warranty or liability for your use of this information.    "

## 2025-05-05 NOTE — ANESTHESIA POSTPROCEDURE EVALUATION
Patient: Edis Burroughs    Procedure: Procedure(s):  COLONOSCOPY, FLEXIBLE, WITH LESION REMOVAL USING SNARE       Anesthesia Type:  General    Note:  Disposition: Outpatient   Postop Pain Control: Uneventful            Sign Out: Well controlled pain   PONV: No   Neuro/Psych: Uneventful            Sign Out: Acceptable/Baseline neuro status   Airway/Respiratory: Uneventful            Sign Out: Acceptable/Baseline resp. status   CV/Hemodynamics: Uneventful            Sign Out: Acceptable CV status; No obvious hypovolemia; No obvious fluid overload   Other NRE:    DID A NON-ROUTINE EVENT OCCUR?            Last vitals:  Vitals Value Taken Time   /87 05/05/25 0800   Temp 36.4  C (97.6  F) 05/05/25 0800   Pulse 74 05/05/25 0800   Resp 18 05/05/25 0800   SpO2 95 % 05/05/25 0803   Vitals shown include unfiled device data.    Electronically Signed By: KEENAN Marquez CRNA  May 5, 2025  8:04 AM

## 2025-05-05 NOTE — H&P
General Surgery H&P  Edis Burroughs MRN# [id number]   Age/Sex: 59 year old male YOB: 1965     Reason for visit: Colonoscopy       Referring physician: Dr. Jessica                    Assessment and Plan:   Assessment:  Colon polyps    Plan:  -To the OR for colonoscopy  -Risk and benefits of procedure explained detail to the patient.  Risks include infection, bleeding, damage to the surrounding structures and possible perforation of the hollow organs.  Patient verbalized understanding provided consent to undergo the procedure above.          Chief Complaint:   Presenting for colonoscopy     History is obtained from the patient    HPI:   Edis Burroughs is a 59 year old male who presents for colonoscopy.  Patient tolerated the prep well.  The patient's last colonoscopy was 2019.  Family history shows no history of colon cancer.  No new complaints.           Past Medical History:     Past Medical History:   Diagnosis Date    Bipolar disorder (H)     Hypertension     Left inguinal hernia 1/15/2020    Added automatically from request for surgery [id number]              Past Surgical History:     Past Surgical History:   Procedure Laterality Date    DAVINCI XI HERNIORRHAPHY INGUINAL Right 5/22/2023    Procedure: Robot-assisted laparoscopic right inguinal hernia repair with mesh;  Surgeon: Javier Vinson DO;  Location:  OR    GENITOURINARY SURGERY      LAPAROSCOPIC HERNIORRHAPHY INGUINAL BILATERAL Bilateral 2/11/2020    Procedure: HERNIORRHAPHY, INGUINAL, left LAPAROSCOPIC;  Surgeon: Han Ang MD;  Location: [facility] APPENDECTOMY  1997    [facility] LAMINECTOMY,FACETECTOMY,LUMBAR  2008             Social History:    reports that he has never smoked. He has never used smokeless tobacco. He reports current alcohol use. He reports that he does not use drugs.           Family History:     Family History   Problem Relation Age of Onset    Prostate Cancer Father     Heart Disease Father         open  "heart surgery    Cancer Father         lung    C.A.D. Father         triple bypass, first MI age 59    Cancer Sister         leukemia    Diabetes Maternal Grandmother     BARTOLO.A.D. Maternal Grandmother     Diabetes Maternal Grandfather     C.A.D. Maternal Grandfather     Colon Cancer Maternal Grandfather     Cerebrovascular Disease Paternal Grandfather     C.A.D. Paternal Grandfather     C.A.D. Paternal Grandmother     Colon Cancer Maternal Uncle     Hypertension No family hx of     Breast Cancer No family hx of     Cancer - colorectal No family hx of               Allergies:     Allergies   Allergen Reactions    Vicodin [Acetaminophen] Itching, Rash and GI Disturbance     Stomach discomfort              Medications:     Prior to Admission medications    Medication Sig Start Date End Date Taking? Authorizing Provider   divalproex (DEPAKOTE) 500 MG 24 hr tablet Take 500 mg by mouth daily.   Yes Reported, Patient   LamoTRIgine 300 MG TB24 One daily   Yes Reported, Patient   losartan-hydrochlorothiazide (HYZAAR) 100-12.5 MG tablet TAKE ONE TABLET BY MOUTH ONCE DAILY 6/9/20  Yes Ashwini Jessica MD   QUEtiapine (SEROQUEL) 300 MG tablet Take 600 mg by mouth At Bedtime TID qhs   Yes Reported, Patient   senna (SENOKOT) 8.6 MG tablet Take 1 tablet by mouth daily   Yes Reported, Patient   ZOLOFT 100 MG OR TABS 2 TABLET DAILY 3/11/10  Yes Ashwini Jessica MD              Review of Systems:   A 12 point Review of Systems is negative other than noted in the HPI            Physical Exam:   Patient Vitals for the past 24 hrs:   BP Temp Temp src Pulse Resp Height Weight   05/05/25 0634 (!) (P) 139/104 (P) 97.7  F (36.5  C) (P) Oral (P) 82 (P) 16 (P) 1.702 m (5' 7\") (P) 91.2 kg (201 lb)        No intake or output data in the 24 hours ending 05/05/25 0718   Constitutional:   awake, alert, cooperative, no apparent distress, and appears stated age       Eyes:   PERRL, conjunctiva/corneas clear, EOM's intact; no scleral edema or " icterus noted        ENT:   Normocephalic, without obvious abnormality, atraumatic, Lips, mucosa, and tongue normal        Hematologic / Lymphatic:   No lymphadenopathy       Lungs:   Normal respiratory effort, no accessory muscle use, breath sounds bilaterally on auscultation       Cardiovascular:   Regular rate and rhythm       Abdomen:   Soft, nondistended, nontender to palpation       Musculoskeletal:   No obvious swelling, bruising or deformity       Skin:   Skin color and texture normal for patient, no rashes or lesions              Data:          DO Elmer Molina DO  General Surgeon  [facility]  Surgery [address] [address]?  Office: [phone]  Employed by - [facility]  Pager: [phone]

## 2025-05-06 LAB
PATH REPORT.COMMENTS IMP SPEC: NORMAL
PATH REPORT.COMMENTS IMP SPEC: NORMAL
PATH REPORT.FINAL DX SPEC: NORMAL
PATH REPORT.GROSS SPEC: NORMAL
PATH REPORT.MICROSCOPIC SPEC OTHER STN: NORMAL
PATH REPORT.RELEVANT HX SPEC: NORMAL
PHOTO IMAGE: NORMAL

## 2025-05-06 PROCEDURE — 88305 TISSUE EXAM BY PATHOLOGIST: CPT | Mod: 26 | Performed by: PATHOLOGY

## 2025-05-09 ENCOUNTER — RESULTS FOLLOW-UP (OUTPATIENT)
Dept: SURGERY | Facility: CLINIC | Age: 60
End: 2025-05-09

## 2025-05-10 NOTE — RESULT ENCOUNTER NOTE
Called patient with results. Your colonoscopy results showed polyp(s).  Recommendation is to repeat colonoscopy in 5 years due to having multiple tubular adenomas.         Elmer Wilkerson DO  General Surgeon  [facility]  Surgery [facility] - [address] [address]?  Office: [phone]  Employed by - [employer] [facility] [employer]  Pager: [phone]

## 2025-07-29 ENCOUNTER — PATIENT OUTREACH (OUTPATIENT)
Dept: CARE COORDINATION | Facility: CLINIC | Age: 60
End: 2025-07-29
Payer: COMMERCIAL

## 2025-08-12 ENCOUNTER — PATIENT OUTREACH (OUTPATIENT)
Dept: CARE COORDINATION | Facility: CLINIC | Age: 60
End: 2025-08-12
Payer: COMMERCIAL

## (undated) DEVICE — SOL NACL 0.9% IRRIG 1000ML BOTTLE 07138-09

## (undated) DEVICE — GLOVE BIOGEL PI INDICATOR 8.0 LF 41680

## (undated) DEVICE — PREP CHLORAPREP 26ML TINTED ORANGE  260815

## (undated) DEVICE — ENDO TROCAR BLUNT TIP KII BALLOON 12X100MM C0R47

## (undated) DEVICE — DECANTER VIAL 2006S

## (undated) DEVICE — DRAPE U SPLIT 74X120" 29440

## (undated) DEVICE — DAVINCI HOT SHEARS TIP COVER  400180

## (undated) DEVICE — GLOVE BIOGEL PI ULTRATOUCH G SZ 7.5 42175

## (undated) DEVICE — Device

## (undated) DEVICE — SUCTION IRR STRYKERFLOW II W/TIP 250-070-520

## (undated) DEVICE — NDL INSUFFLATION 120MM VERRES 172015

## (undated) DEVICE — ESU GROUND PAD UNIVERSAL W/O CORD

## (undated) DEVICE — SOL NACL 0.9% INJ 1000ML BAG 07983-09

## (undated) DEVICE — DAVINCI XI OBTURATOR BLADELESS 8MM 470359

## (undated) DEVICE — PACK GENERAL LAPAOSCOPY

## (undated) DEVICE — GOWN XLG DISP 9545

## (undated) DEVICE — KIT PATIENT POSITIONING PIGAZZI LATEX FREE 40580

## (undated) DEVICE — DAVINCI XI DRAPE COLUMN 470341

## (undated) DEVICE — SU VICRYL 0 UR-6 27" J603H

## (undated) DEVICE — SUCTION MANIFOLD NEPTUNE 2 SYS 4 PORT 0702-020-000

## (undated) DEVICE — DRAPE POUCH INSTRUMENT 3 POCKET 1018L

## (undated) DEVICE — DAVINCI XI DRAPE ARM 470015

## (undated) DEVICE — SU VICRYL 4-0 FS-2 27" J422-H

## (undated) DEVICE — DAVINCI XI SEAL UNIVERSAL 5-8MM 470361

## (undated) DEVICE — ADH SKIN CLOSURE PREMIERPRO EXOFIN 1.0ML 3470

## (undated) DEVICE — DEVICE ABSORBABLE TACK 5MM SINGLE ABSTACK30

## (undated) DEVICE — GLOVE PROTEXIS W/NEU-THERA 7.5  2D73TE75

## (undated) DEVICE — SOL WATER IRRIG 1000ML BOTTLE 2F7114

## (undated) DEVICE — SU MONOCRYL 4-0 PS-2 18" UND Y496G

## (undated) DEVICE — STOCKING SLEEVE COMPRESSION CALF MED

## (undated) DEVICE — SYSTEM LAPAROVUE VISIBILITY LAPVUE10

## (undated) DEVICE — SU STRATAFIX PDS PLUS 2-0 SPIRAL SH 23CM SXPP1B433

## (undated) DEVICE — SU VICRYL 2-0 SH 27" UND J417H

## (undated) DEVICE — ENDO TROCAR FIRST ENTRY KII FIOS ADV FIX 05X100MM CFF03

## (undated) DEVICE — ESU ENDO SCISSORS 5MM CVD 5DCS

## (undated) DEVICE — SYR 50ML SLIP TIP W/O NDL 309654

## (undated) DEVICE — DRSG KERLIX 4 1/2"X4YDS ROLL 6730

## (undated) DEVICE — ENDO FORCEP ENDOJAW BIOPSY 3.7MMX230CM FB-222U

## (undated) DEVICE — ENDO SNARE EXACTO COLD 9MM LOOP 2.4MMX230CM 00711115

## (undated) DEVICE — ENDO TROCAR DISSECTING BALLOON OMS-PDBS2

## (undated) DEVICE — BLADE CLIPPER 4406

## (undated) RX ORDER — ONDANSETRON 2 MG/ML
INJECTION INTRAMUSCULAR; INTRAVENOUS
Status: DISPENSED
Start: 2020-02-11

## (undated) RX ORDER — FENTANYL CITRATE 50 UG/ML
INJECTION, SOLUTION INTRAMUSCULAR; INTRAVENOUS
Status: DISPENSED
Start: 2020-02-11

## (undated) RX ORDER — DEXAMETHASONE SODIUM PHOSPHATE 4 MG/ML
INJECTION, SOLUTION INTRA-ARTICULAR; INTRALESIONAL; INTRAMUSCULAR; INTRAVENOUS; SOFT TISSUE
Status: DISPENSED
Start: 2020-02-11

## (undated) RX ORDER — SIMETHICONE 40MG/0.6ML
SUSPENSION, DROPS(FINAL DOSAGE FORM)(ML) ORAL
Status: DISPENSED
Start: 2025-05-05

## (undated) RX ORDER — BUPIVACAINE HYDROCHLORIDE AND EPINEPHRINE 2.5; 5 MG/ML; UG/ML
INJECTION, SOLUTION EPIDURAL; INFILTRATION; INTRACAUDAL; PERINEURAL
Status: DISPENSED
Start: 2020-02-11

## (undated) RX ORDER — LIDOCAINE HYDROCHLORIDE 10 MG/ML
INJECTION, SOLUTION EPIDURAL; INFILTRATION; INTRACAUDAL; PERINEURAL
Status: DISPENSED
Start: 2025-05-05

## (undated) RX ORDER — PROPOFOL 10 MG/ML
INJECTION, EMULSION INTRAVENOUS
Status: DISPENSED
Start: 2020-02-11

## (undated) RX ORDER — PROPOFOL 10 MG/ML
INJECTION, EMULSION INTRAVENOUS
Status: DISPENSED
Start: 2025-05-05

## (undated) RX ORDER — PROPOFOL 10 MG/ML
INJECTION, EMULSION INTRAVENOUS
Status: DISPENSED
Start: 2023-05-22

## (undated) RX ORDER — BUPIVACAINE HYDROCHLORIDE AND EPINEPHRINE 2.5; 5 MG/ML; UG/ML
INJECTION, SOLUTION EPIDURAL; INFILTRATION; INTRACAUDAL; PERINEURAL
Status: DISPENSED
Start: 2023-05-22

## (undated) RX ORDER — ONDANSETRON 2 MG/ML
INJECTION INTRAMUSCULAR; INTRAVENOUS
Status: DISPENSED
Start: 2023-05-22

## (undated) RX ORDER — HYDROMORPHONE HYDROCHLORIDE 1 MG/ML
INJECTION, SOLUTION INTRAMUSCULAR; INTRAVENOUS; SUBCUTANEOUS
Status: DISPENSED
Start: 2020-02-11

## (undated) RX ORDER — DEXAMETHASONE SODIUM PHOSPHATE 10 MG/ML
INJECTION, SOLUTION INTRAMUSCULAR; INTRAVENOUS
Status: DISPENSED
Start: 2023-05-22

## (undated) RX ORDER — OXYCODONE HYDROCHLORIDE 5 MG/1
TABLET ORAL
Status: DISPENSED
Start: 2020-02-11

## (undated) RX ORDER — FENTANYL CITRATE 50 UG/ML
INJECTION, SOLUTION INTRAMUSCULAR; INTRAVENOUS
Status: DISPENSED
Start: 2023-05-22

## (undated) RX ORDER — PROPOFOL 10 MG/ML
INJECTION, EMULSION INTRAVENOUS
Status: DISPENSED
Start: 2019-06-25

## (undated) RX ORDER — LIDOCAINE HYDROCHLORIDE 10 MG/ML
INJECTION, SOLUTION EPIDURAL; INFILTRATION; INTRACAUDAL; PERINEURAL
Status: DISPENSED
Start: 2019-06-25

## (undated) RX ORDER — DEXMEDETOMIDINE HYDROCHLORIDE 100 UG/ML
INJECTION, SOLUTION INTRAVENOUS
Status: DISPENSED
Start: 2023-05-22

## (undated) RX ORDER — LIDOCAINE HYDROCHLORIDE 10 MG/ML
INJECTION, SOLUTION EPIDURAL; INFILTRATION; INTRACAUDAL; PERINEURAL
Status: DISPENSED
Start: 2023-05-22

## (undated) RX ORDER — LIDOCAINE HYDROCHLORIDE 10 MG/ML
INJECTION, SOLUTION EPIDURAL; INFILTRATION; INTRACAUDAL; PERINEURAL
Status: DISPENSED
Start: 2020-02-11

## (undated) RX ORDER — CEFAZOLIN SODIUM 2 G/100ML
INJECTION, SOLUTION INTRAVENOUS
Status: DISPENSED
Start: 2020-02-11

## (undated) RX ORDER — KETOROLAC TROMETHAMINE 30 MG/ML
INJECTION, SOLUTION INTRAMUSCULAR; INTRAVENOUS
Status: DISPENSED
Start: 2020-02-11